# Patient Record
Sex: MALE | Race: WHITE | Employment: FULL TIME | URBAN - METROPOLITAN AREA
[De-identification: names, ages, dates, MRNs, and addresses within clinical notes are randomized per-mention and may not be internally consistent; named-entity substitution may affect disease eponyms.]

---

## 2017-01-05 ENCOUNTER — OFFICE VISIT (OUTPATIENT)
Dept: FAMILY MEDICINE | Facility: CLINIC | Age: 32
End: 2017-01-05
Payer: COMMERCIAL

## 2017-01-05 VITALS
DIASTOLIC BLOOD PRESSURE: 78 MMHG | WEIGHT: 204 LBS | SYSTOLIC BLOOD PRESSURE: 116 MMHG | BODY MASS INDEX: 29.27 KG/M2 | TEMPERATURE: 97.5 F | HEART RATE: 67 BPM | OXYGEN SATURATION: 96 %

## 2017-01-05 DIAGNOSIS — H65.01 RIGHT ACUTE SEROUS OTITIS MEDIA, RECURRENCE NOT SPECIFIED: Primary | ICD-10-CM

## 2017-01-05 PROCEDURE — 99213 OFFICE O/P EST LOW 20 MIN: CPT | Performed by: FAMILY MEDICINE

## 2017-01-05 RX ORDER — PREDNISONE 20 MG/1
60 TABLET ORAL DAILY
Qty: 15 TABLET | Refills: 0 | Status: SHIPPED | OUTPATIENT
Start: 2017-01-05 | End: 2017-08-21

## 2017-01-05 RX ORDER — DOXYCYCLINE 100 MG/1
CAPSULE ORAL
Refills: 0 | COMMUNITY
Start: 2017-01-03 | End: 2017-08-21

## 2017-01-05 RX ORDER — CIPROFLOXACIN 500 MG/1
500 TABLET, FILM COATED ORAL 2 TIMES DAILY
Qty: 20 TABLET | Refills: 0 | Status: SHIPPED | OUTPATIENT
Start: 2017-01-05 | End: 2017-08-21

## 2017-01-05 ASSESSMENT — PAIN SCALES - GENERAL: PAINLEVEL: NO PAIN (0)

## 2017-01-05 NOTE — MR AVS SNAPSHOT
After Visit Summary   1/5/2017    Abelino Gracia    MRN: [id number]           Patient Information     Date Of Birth          1985        Visit Information        Provider Department      1/5/2017 2:20 PM Abdias Garcia MD [facility]        Today's Diagnoses     Right acute serous otitis media, recurrence not specified    -  1        Follow-ups after your visit        Your next 10 appointments already scheduled     Feb 22, 2017  4:30 PM   LAB with CP LAB   [facility] ([facility])    [address] [facility] [address]   [phone]           Patient must bring picture ID.  Patient should be prepared to give a urine specimen  Please do not eat 10-12 hours before your appointment if you are coming in fasting for labs on lipids, cholesterol, or glucose (sugar).  Pregnant women should follow their Care Team instructions. Water with medications is okay. Do not drink coffee or other fluids.   If you have concerns about taking  your medications, please ask at office or if scheduling via [employer], send a message by clicking on Secure Messaging, Message Your Care Team.            Apr 13, 2017 10:40 AM   Return Visit with Anju Vizcarra MD   [facility] ([facility]    [id number] [place] [address] [facility] [address] [phone]            Aug 21, 2017  7:30 AM   LAB with LAB FIRST FLOOR [facility] ([facility])    [id number] [place] [id number] [phone]           Patient must bring picture ID.  Patient should be prepared to give a urine specimen  Please do not eat 10-12 hours before your appointment if you are coming in fasting for labs on lipids, cholesterol, or glucose (sugar).  Pregnant women should follow their Care Team instructions. Water with medications is okay. Do not drink coffee or other  fluids.   If you have concerns about taking  your medications, please ask at office or if scheduling via Preo, send a message by clicking on Secure Messaging, Message Your Care Team.            Aug 21, 2017  8:00 AM   [facility]    [address] [place] [address] [phone]              Who to contact     If you have questions or need follow up information about today's clinic visit or your schedule please contact [facility] directly at [phone].  Normal or non-critical lab and imaging results will be communicated to you by MyChart, letter or phone within 4 business days after the clinic has received the results. If you do not hear from us within 7 days, please contact the clinic through IsoPlexishart or phone. If you have a critical or abnormal lab result, we will notify you by phone as soon as possible.  Submit refill requests through Preo or call your pharmacy and they will forward the refill request to us. Please allow 3 business days for your refill to be completed.          Additional Information About Your Visit        IsoPlexisharThrill Information     Preo gives you secure access to your electronic health record. If you see a primary care provider, you can also send messages to your care team and make appointments. If you have questions, please call your primary care clinic.  If you do not have a primary care provider, please call [phone] and they will assist you.        Care EveryWhere ID     This is your Care EveryWhere ID. This could be used by other organizations to access your [place] medical records  [id number]        Your Vitals Were     Pulse Temperature Pulse Oximetry             67 97.5  F (36.4  C) (Oral) 96%          Blood Pressure from Last 3 Encounters:   01/05/17 116/78   11/14/16 122/78   10/13/16 121/70    Weight from Last 3 Encounters:   01/05/17 204  lb (92.534 kg)   10/13/16 207 lb 8 oz (94.121 kg)   10/12/16 207 lb 14.4 oz (94.303 kg)              Today, you had the following     No orders found for display         Today's Medication Changes          These changes are accurate as of: 1/5/17  2:55 PM.  If you have any questions, ask your nurse or doctor.               Start taking these medicines.        Dose/Directions    ciprofloxacin 500 MG tablet   Commonly known as:  CIPRO   Used for:  Right acute serous otitis media, recurrence not specified   Started by:  Abdias Garcia MD        Dose:  500 mg   Take 1 tablet (500 mg) by mouth 2 times daily   Quantity:  20 tablet   Refills:  0       predniSONE 20 MG tablet   Commonly known as:  DELTASONE   Used for:  Right acute serous otitis media, recurrence not specified   Started by:  Abdias Garcia MD        Dose:  60 mg   Take 3 tablets (60 mg) by mouth daily   Quantity:  15 tablet   Refills:  0            Where to get your medicines      These medications were sent to [facility] [place] [address]  [address] [facility] [address]     Phone:  [phone]    - ciprofloxacin 500 MG tablet  - predniSONE 20 MG tablet             Primary Care Provider Office Phone # Fax #    Abdias Garcia [phone]       [facility] [address] [facility] [address]        Thank you!     Thank you for choosing [facility]  for your care. Our goal is always to provide you with excellent care. Hearing back from our patients is one way we can continue to improve our services. Please take a few minutes to complete the written survey that you may receive in the mail after your visit with us. Thank you!             Your Updated Medication List - Protect others around you: Learn how to safely use, store and throw away your medicines at www.disposemymeds.org.          This list is accurate  as of: 1/5/17  2:55 PM.  Always use your most recent med list.                   Brand Name Dispense Instructions for use    * albuterol 108 (90 BASE) MCG/ACT Inhaler    albuterol    2 Inhaler    Inhale 2 puffs into the lungs every 4 hours as needed       * albuterol (2.5 MG/3ML) 0.083% neb solution     360 mL    Take 1 vial (2.5 mg) by nebulization every 4 hours as needed       ALLEGRA PO      Take 60 mg by mouth daily       cetirizine 10 MG tablet    zyrTEC     Take 10 mg by mouth       ciprofloxacin 500 MG tablet    CIPRO    20 tablet    Take 1 tablet (500 mg) by mouth 2 times daily       doxycycline Monohydrate 100 MG Caps      TK 1 C PO Q 12 H UTD       FLONASE 50 MCG/ACT spray   Generic drug:  fluticasone     1 Package    Spray 1-2 sprays into both nostrils daily       losartan 25 MG tablet    COZAAR    45 tablet    Take 0.5 tablets (12.5 mg) by mouth daily       predniSONE 20 MG tablet    DELTASONE    15 tablet    Take 3 tablets (60 mg) by mouth daily       propranolol 60 MG 24 hr capsule    INDERAL LA    90 capsule    Take 1 capsule (60 mg) by mouth daily       traZODone 50 MG tablet    DESYREL    270 tablet    Take 3 tablets (150 mg) by mouth nightly as needed for sleep       TYLENOL PO      Take by mouth as needed daily       * Notice:  This list has 2 medication(s) that are the same as other medications prescribed for you. Read the directions carefully, and ask your doctor or other care provider to review them with you.

## 2017-01-05 NOTE — PROGRESS NOTES
SUBJECTIVE:                                                    Abelino Gracia is a 31 year old male who presents to clinic today for the following health issues:    Bilat ears feel plugged  For the last several days.    Problem list and histories reviewed & adjusted, as indicated.    Patient has cold symptoms   He has no fever   No sore throat   No cough     He was on Virtual and was told he had a sinus infection   He was put on doxycycline     He is not better     O: /78 mmHg  Pulse 67  Temp(Src) 97.5  F (36.4  C) (Oral)  Wt 204 lb (92.534 kg)  SpO2 96%    Head: Normocephalic, atraumatic.  Eyes: Conjunctiva clear, non icteric. PERRLA.  Ears: External ears and TMs on the right have serous fluid ; left appears ok   Sinuses are non tender   Nose: Septum midline, nasal mucosa pink and moist. No discharge.  Mouth / Throat: Normal dentition.  No oral lesions. Pharynx non erythematous, tonsils without hypertrophy.  Neck: Supple, no enlarged LN, trachea midline.    Chest wall normal to inspection and palpation. Good excursion bilaterally. Lungs clear to auscultation. Good air movement bilaterally without rales, wheezes, or rhonchi.   Regular rate and  rhythm. S1 and S2 normal, no murmurs, clicks, gallops or rubs. No edema or JVD.      ICD-10-CM    1. Right acute serous otitis media, recurrence not specified H65.01 predniSONE (DELTASONE) 20 MG tablet     ciprofloxacin (CIPRO) 500 MG tablet     Stop the doxycycline   Warned about increased risk of tendon rupture   If not better in 4 weeks have him follow up with ENT

## 2017-01-05 NOTE — NURSING NOTE
"Chief Complaint   Patient presents with     Ear Problem     Bilat ears feel plugged for several days       Initial /78 mmHg  Pulse 67  Temp(Src) 97.5  F (36.4  C) (Oral)  Wt 204 lb (92.534 kg)  SpO2 96% Estimated body mass index is 29.27 kg/(m^2) as calculated from the following:    Height as of 10/13/16: 5' 10\" (1.778 m).    Weight as of this encounter: 204 lb (92.534 kg).  BP completed using cuff size: regular  Avani SINGH      "

## 2017-02-22 ENCOUNTER — DOCUMENTATION ONLY (OUTPATIENT)
Dept: LAB | Facility: CLINIC | Age: 32
End: 2017-02-22

## 2017-02-22 NOTE — PROGRESS NOTES
This patient has a lab appointment today at 1630 and the order is . Did you want lab work on this patient?? If so, please place lab orders ASAP. THank you, [place] lab.

## 2017-03-10 ENCOUNTER — OFFICE VISIT (OUTPATIENT)
Dept: FAMILY MEDICINE | Facility: CLINIC | Age: 32
End: 2017-03-10
Payer: COMMERCIAL

## 2017-03-10 VITALS
BODY MASS INDEX: 29.35 KG/M2 | HEART RATE: 102 BPM | WEIGHT: 205 LBS | HEIGHT: 70 IN | DIASTOLIC BLOOD PRESSURE: 80 MMHG | OXYGEN SATURATION: 100 % | SYSTOLIC BLOOD PRESSURE: 129 MMHG | TEMPERATURE: 97.3 F

## 2017-03-10 DIAGNOSIS — F51.01 PRIMARY INSOMNIA: Primary | ICD-10-CM

## 2017-03-10 DIAGNOSIS — F17.200 TOBACCO USE DISORDER: ICD-10-CM

## 2017-03-10 DIAGNOSIS — G43.109 MIGRAINE WITH AURA AND WITHOUT STATUS MIGRAINOSUS, NOT INTRACTABLE: ICD-10-CM

## 2017-03-10 DIAGNOSIS — N18.30 CKD (CHRONIC KIDNEY DISEASE) STAGE 3, GFR 30-59 ML/MIN (H): ICD-10-CM

## 2017-03-10 DIAGNOSIS — J45.20 MILD INTERMITTENT ASTHMA WITHOUT COMPLICATION: ICD-10-CM

## 2017-03-10 LAB
ANION GAP SERPL CALCULATED.3IONS-SCNC: 7 MMOL/L (ref 3–14)
BUN SERPL-MCNC: 31 MG/DL (ref 7–30)
CALCIUM SERPL-MCNC: 8.9 MG/DL (ref 8.5–10.1)
CHLORIDE SERPL-SCNC: 109 MMOL/L (ref 94–109)
CO2 SERPL-SCNC: 25 MMOL/L (ref 20–32)
CREAT SERPL-MCNC: 1.75 MG/DL (ref 0.66–1.25)
FEF 25/75: NORMAL
FEV-1: NORMAL
FEV1/FVC: NORMAL
FVC: NORMAL
GFR SERPL CREATININE-BSD FRML MDRD: 45 ML/MIN/1.7M2
GLUCOSE SERPL-MCNC: 116 MG/DL (ref 70–99)
POTASSIUM SERPL-SCNC: 4.3 MMOL/L (ref 3.4–5.3)
SODIUM SERPL-SCNC: 141 MMOL/L (ref 133–144)

## 2017-03-10 PROCEDURE — 99213 OFFICE O/P EST LOW 20 MIN: CPT | Mod: 25 | Performed by: FAMILY MEDICINE

## 2017-03-10 PROCEDURE — 80048 BASIC METABOLIC PNL TOTAL CA: CPT | Performed by: FAMILY MEDICINE

## 2017-03-10 PROCEDURE — 36415 COLL VENOUS BLD VENIPUNCTURE: CPT | Performed by: FAMILY MEDICINE

## 2017-03-10 PROCEDURE — 94010 BREATHING CAPACITY TEST: CPT | Performed by: FAMILY MEDICINE

## 2017-03-10 PROCEDURE — 99395 PREV VISIT EST AGE 18-39: CPT | Mod: 25 | Performed by: FAMILY MEDICINE

## 2017-03-10 RX ORDER — ALBUTEROL SULFATE 90 UG/1
2 AEROSOL, METERED RESPIRATORY (INHALATION) EVERY 4 HOURS PRN
Qty: 2 INHALER | Refills: 3 | Status: SHIPPED | OUTPATIENT
Start: 2017-03-10 | End: 2019-07-30

## 2017-03-10 RX ORDER — PROPRANOLOL HCL 60 MG
60 CAPSULE, EXTENDED RELEASE 24HR ORAL DAILY
Qty: 90 CAPSULE | Refills: 3 | Status: SHIPPED | OUTPATIENT
Start: 2017-03-10 | End: 2018-02-08

## 2017-03-10 NOTE — PROGRESS NOTES
SUBJECTIVE:     CC: Abelino Gracia is an 32 year old male who presents for preventative health visit.     Healthy Habits:    Do you get at least three servings of calcium containing foods daily (dairy, green leafy vegetables, etc.)? yes    Amount of exercise or daily activities, outside of work: 0 day(s) per week    Problems taking medications regularly No    Medication side effects: No    Have you had an eye exam in the past two years? yes    Do you see a dentist twice per year? yes    Do you have sleep apnea, excessive snoring or daytime drowsiness?yes        PROBLEMS TO ADD ON...  -------------------------------------  Had mold testing done in his home and it was discovered that he has over     THIS HAS been since January and his asthma got worse   He is having runny nose   He has allergies in the summer with pollen   He had testing done years agp         Today's PHQ-2 Score:   PHQ-2 ( 1999 [employer]) 3/7/2016 3/13/2015   Q1: Little interest or pleasure in doing things 0 0   Q2: Feeling down, depressed or hopeless 0 0   PHQ-2 Score 0 0       Abuse: Current or Past(Physical, Sexual or Emotional)- No  Do you feel safe in your environment - Yes    Social History   Substance Use Topics     Smoking status: Current Every Day Smoker     Packs/day: 1.00     Years: 12.00     Types: Cigarettes     Smokeless tobacco: Never Used      Comment: Have tried chantix     Alcohol use No      Comment: SOBER SINCE JANUARY 2009     The patient does not drink >3 drinks per day nor >7 drinks per week.    Last PSA: No results found for: PSA    Recent Labs   Lab Test  03/07/16   0810  03/13/15   0825   CHOL  162  128   HDL  35*  32*   LDL  103*  70   TRIG  122  131   CHOLHDLRATIO   --   4.0   NHDL  127   --        Reviewed orders with patient. Reviewed health maintenance and updated orders accordingly - Yes    Reviewed and updated as needed this visit by clinical staff         Reviewed and updated as needed this visit by  Provider        Past Medical History   Diagnosis Date     Allergies      cats,dust, pollens     Anxiety state, unspecified      Asthma, mild intermittent      Depression      Insomnia, unspecified      Leukocytosis 5/13/2015     Migraine headaches      Nondependent alcohol abuse      sober since Jan 2009     DESHAUN (obstructive sleep apnea)      not tolerating CPAP     Panic disorder without agoraphobia      Tobacco abuse       Past Surgical History   Procedure Laterality Date     Appendectomy  08/25/2004     Tonsillectomy  06/08/2006     C nonspecific procedure   Feb 2011      Laparoscopic cholecystectomy.     Cholecystectomy       Colonoscopy N/A 11/14/2016     Procedure: COLONOSCOPY;  Surgeon: Mauro Tan MD;  Location:  GI       ROS:  C: NEGATIVE for fever, chills, change in weight  I: NEGATIVE for worrisome rashes, moles or lesions  E: NEGATIVE for vision changes or irritation  ENT: NEGATIVE for ear, mouth and throat problems  R: NEGATIVE for significant cough or SOB  CV: NEGATIVE for chest pain, palpitations or peripheral edema  GI: NEGATIVE for nausea, abdominal pain, heartburn, or change in bowel habits   male: negative for dysuria, hematuria, decreased urinary stream, erectile dysfunction, urethral discharge  M: NEGATIVE for significant arthralgias or myalgia  N: NEGATIVE for weakness, dizziness or paresthesias  P: NEGATIVE for changes in mood or affect    Problem list, Medication list, Allergies, and Medical/Social/Surgical histories reviewed in [facility] and updated as appropriate.  Labs reviewed in EPIC  BP Readings from Last 3 Encounters:   03/10/17 129/80   01/05/17 116/78   11/14/16 122/78    Wt Readings from Last 3 Encounters:   03/10/17 205 lb (93 kg)   01/05/17 204 lb (92.5 kg)   10/13/16 207 lb 8 oz (94.1 kg)                  Patient Active Problem List   Diagnosis     Insomnia     Migraine headache     Tobacco abuse     Asthma, mild intermittent     DESHAUN (obstructive sleep apnea)      CARDIOVASCULAR SCREENING; LDL GOAL LESS THAN 160     CKD (chronic kidney disease) stage 3, GFR 30-59 ml/min     Leukocytosis     QURESHI (nonalcoholic steatohepatitis)     BMI 31.0-31.9,adult     Hypophosphatemia     Past Surgical History   Procedure Laterality Date     Appendectomy  2004     Tonsillectomy  2006     C nonspecific procedure   2011      Laparoscopic cholecystectomy.     Cholecystectomy       Colonoscopy N/A 2016     Procedure: COLONOSCOPY;  Surgeon: Mauro Tan MD;  Location:  GI       Social History   Substance Use Topics     Smoking status: Current Every Day Smoker     Packs/day: 1.00     Years: 12.00     Types: Cigarettes     Smokeless tobacco: Never Used      Comment: Have tried chantix     Alcohol use No      Comment: SOBER SINCE 2009     Family History   Problem Relation Age of Onset     Allergies Mother      Allergies Brother      HEART DISEASE Maternal Grandfather      Hypertension Maternal Grandfather      Asthma Other      Cancer - colorectal Maternal Grandmother 80     CANCER Maternal Grandmother 40     uterine cancer     KIDNEY DISEASE Maternal Grandmother       of kidney failure - ~ age 80     DIABETES Maternal Grandmother      Colon Cancer Maternal Grandmother      Other Cancer Maternal Grandmother          Current Outpatient Prescriptions   Medication Sig Dispense Refill     cetirizine (ZYRTEC) 10 MG tablet Take 10 mg by mouth       losartan (COZAAR) 25 MG tablet Take 0.5 tablets (12.5 mg) by mouth daily 45 tablet 3     traZODone (DESYREL) 50 MG tablet Take 3 tablets (150 mg) by mouth nightly as needed for sleep 270 tablet 11     propranolol (INDERAL LA) 60 MG capsule Take 1 capsule (60 mg) by mouth daily 90 capsule 3     fluticasone (FLONASE) 50 MCG/ACT nasal spray Spray 1-2 sprays into both nostrils daily 1 Package 11     Fexofenadine HCl (ALLEGRA PO) Take 60 mg by mouth daily        doxycycline Monohydrate 100 MG CAPS Reported on 3/10/2017  0      predniSONE (DELTASONE) 20 MG tablet Take 3 tablets (60 mg) by mouth daily (Patient not taking: Reported on 3/10/2017) 15 tablet 0     ciprofloxacin (CIPRO) 500 MG tablet Take 1 tablet (500 mg) by mouth 2 times daily (Patient not taking: Reported on 3/10/2017) 20 tablet 0     albuterol (ALBUTEROL) 108 (90 BASE) MCG/ACT inhaler Inhale 2 puffs into the lungs every 4 hours as needed (Patient not taking: Reported on 3/10/2017) 2 Inhaler 3     albuterol (2.5 MG/3ML) 0.083% nebulizer solution Take 1 vial (2.5 mg) by nebulization every 4 hours as needed (Patient not taking: Reported on 3/10/2017) 360 mL 3     Acetaminophen (TYLENOL PO) Take by mouth as needed daily       Allergies   Allergen Reactions     Clarithromycin Hives     Penicillins      Sulfa Drugs      Adhesive Tape Rash     OBJECTIVE:     There were no vitals taken for this visit.  EXAM:  GENERAL: healthy, alert and no distress  EYES: Eyes grossly normal to inspection, PERRL and conjunctivae and sclerae normal  HENT: ear canals and TM's normal, nose and mouth without ulcers or lesions  NECK: no adenopathy, no asymmetry, masses, or scars and thyroid normal to palpation  RESP: lungs clear to auscultation - no rales, rhonchi or wheezes  CV: regular rate and rhythm, normal S1 S2, no S3 or S4, no murmur, click or rub, no peripheral edema and peripheral pulses strong  ABDOMEN: soft, nontender, no hepatosplenomegaly, no masses and bowel sounds normal  MS: no gross musculoskeletal defects noted, no edema  SKIN: no suspicious lesions or rashes  NEURO: Normal strength and tone, mentation intact and speech normal  PSYCH: mentation appears normal, affect normal/bright    ASSESSMENT/PLAN:         ICD-10-CM    1. Primary insomnia F51.01 fluticasone-salmeterol (ADVAIR) 250-50 MCG/DOSE diskus inhaler     SLEEP EVALUATION & MANAGEMENT REFERRAL - ADULT   2. Mild intermittent asthma without complication J45.20 Asthma Action Plan (AAP)     fluticasone-salmeterol (ADVAIR) 250-50  "MCG/DOSE diskus inhaler     albuterol (ALBUTEROL) 108 (90 BASE) MCG/ACT Inhaler     Spirometry, Breathing Capacity   3. Migraine with aura and without status migrainosus, not intractable G43.109 MIGRAINE ACTION PLAN     propranolol (INDERAL LA) 60 MG 24 hr capsule   4. CKD (chronic kidney disease) stage 3, GFR 30-59 ml/min N18.3 BASIC METABOLIC PANEL       COUNSELING:  Reviewed preventive health counseling, as reflected in patient instructions       Regular exercise       Healthy diet/nutrition         reports that he has been smoking Cigarettes.  He has a 12.00 pack-year smoking history. He has never used smokeless tobacco.  Tobacco Cessation Action Plan: Pharmacotherapies : Chantix  Estimated body mass index is 29.27 kg/(m^2) as calculated from the following:    Height as of 10/13/16: 5' 10\" (1.778 m).    Weight as of 1/5/17: 204 lb (92.5 kg).   Weight management plan: Discussed healthy diet and exercise guidelines and patient will follow up in 12 months in clinic to re-evaluate.    Counseling Resources:  ATP IV Guidelines  Pooled Cohorts Equation Calculator  FRAX Risk Assessment  ICSI Preventive Guidelines  Dietary Guidelines for Americans, 2010  USDA's MyPlate  ASA Prophylaxis  Lung CA Screening    Abdias Garcia MD  [facility]  "

## 2017-03-10 NOTE — NURSING NOTE
"Chief Complaint   Patient presents with     Physical       Initial /80 (BP Location: Left arm, Patient Position: Chair, Cuff Size: Adult Large)  Pulse 102  Temp 97.3  F (36.3  C) (Oral)  Ht 5' 9.88\" (1.775 m)  Wt 205 lb (93 kg)  SpO2 100%  BMI 29.51 kg/m2 Estimated body mass index is 29.51 kg/(m^2) as calculated from the following:    Height as of this encounter: 5' 9.88\" (1.775 m).    Weight as of this encounter: 205 lb (93 kg).  Medication Reconciliation: complete   Brittany Judge CMA       "

## 2017-03-10 NOTE — MR AVS SNAPSHOT
After Visit Summary   3/10/2017    Abelino Gracia    MRN: [id number]           Patient Information     Date Of Birth          1985        Visit Information        Provider Department      3/10/2017 7:40 AM Abdias Garcia MD [facility]        Today's Diagnoses     Primary insomnia    -  1    Mild intermittent asthma without complication        Migraine with aura and without status migrainosus, not intractable        CKD (chronic kidney disease) stage 3, GFR 30-59 ml/min        Tobacco use disorder          Care Instructions      Preventive Health Recommendations  Male Ages 26 - 39    Yearly exam:             See your health care provider every year in order to  o   Review health changes.   o   Discuss preventive care.    o   Review your medicines if your doctor has prescribed any.    You should be tested each year for STDs (sexually transmitted diseases), if you re at risk.     After age 35, talk to your provider about cholesterol testing. If you are at risk for heart disease, have your cholesterol tested at least every 5 years.     If you are at risk for diabetes, you should have a diabetes test (fasting glucose).  Shots: Get a flu shot each year. Get a tetanus shot every 10 years.     Nutrition:    Eat at least 5 servings of fruits and vegetables daily.     Eat whole-grain bread, whole-wheat pasta and brown rice instead of white grains and rice.     Talk to your provider about Calcium and Vitamin D.     Lifestyle    Exercise for at least 150 minutes a week (30 minutes a day, 5 days a week). This will help you control your weight and prevent disease.     Limit alcohol to one drink per day.     No smoking.     Wear sunscreen to prevent skin cancer.     See your dentist every six months for an exam and cleaning.           Follow-ups after your visit        Additional Services     SLEEP EVALUATION & MANAGEMENT REFERRAL - ADULT       Please be aware that coverage  of these services is subject to the terms and limitations of your health insurance plan.  Call member services at your health plan with any benefit or coverage questions.      Please bring the following to your appointment:    >>   List of current medications   >>   This referral request   >>   Any documents/labs given to you for this referral    [facility] [phone] (Age 15 and up)                  Your next 10 appointments already scheduled     Apr 13, 2017 10:40 AM CDT   Return Visit with Anju Vizcarra MD   [facility])    [address] [place] [address]   [phone]            Aug 21, 2017  7:30 AM CDT   LAB with LAB FIRST FLOOR [facility])    [address] [place] [address]   [phone]           Patient must bring picture ID.  Patient should be prepared to give a urine specimen  Please do not eat 10-12 hours before your appointment if you are coming in fasting for labs on lipids, cholesterol, or glucose (sugar).  Pregnant women should follow their Care Team instructions. Water with medications is okay. Do not drink coffee or other fluids.   If you have concerns about taking  your medications, please ask at office or if scheduling via [employer], send a message by clicking on Secure Messaging, Message Your Care Team.            Aug 21, 2017  8:00 AM CDT   Return Visit with Amina Mena MD   [facility])    [address] [place] [address]   [phone]              Future tests that were ordered for you today     Open Future Orders        Priority Expected Expires Ordered    SLEEP EVALUATION & MANAGEMENT REFERRAL - ADULT Routine  3/10/2018 3/10/2017            Who to contact     If you have questions or need follow up information about today's clinic visit or your schedule  "please contact [facility] directly at [phone].  Normal or non-critical lab and imaging results will be communicated to you by MyChart, letter or phone within 4 business days after the clinic has received the results. If you do not hear from us within 7 days, please contact the clinic through [employer] or phone. If you have a critical or abnormal lab result, we will notify you by phone as soon as possible.  Submit refill requests through [employer] or call your pharmacy and they will forward the refill request to us. Please allow 3 business days for your refill to be completed.          Additional Information About Your Visit        [employer] Information     [employer] gives you secure access to your electronic health record. If you see a primary care provider, you can also send messages to your care team and make appointments. If you have questions, please call your primary care clinic.  If you do not have a primary care provider, please call [phone] and they will assist you.        Care EveryWhere ID     This is your Care EveryWhere ID. This could be used by other organizations to access your [place] medical records  [id number]        Your Vitals Were     Pulse Temperature Height Pulse Oximetry BMI (Body Mass Index)       102 97.3  F (36.3  C) (Oral) 5' 9.88\" (1.775 m) 100% 29.51 kg/m2        Blood Pressure from Last 3 Encounters:   03/10/17 129/80   01/05/17 116/78   11/14/16 122/78    Weight from Last 3 Encounters:   03/10/17 205 lb (93 kg)   01/05/17 204 lb (92.5 kg)   10/13/16 207 lb 8 oz (94.1 kg)              We Performed the Following     Asthma Action Plan (AAP)     BASIC METABOLIC PANEL     MIGRAINE ACTION PLAN     Spirometry, Breathing Capacity          Today's Medication Changes          These changes are accurate as of: 3/10/17  8:42 AM.  If you have any questions, ask your nurse or doctor.               Start taking these medicines.        Dose/Directions    " fluticasone-salmeterol 250-50 MCG/DOSE diskus inhaler   Commonly known as:  ADVAIR   Used for:  Mild intermittent asthma without complication, Primary insomnia   Started by:  Abdias aGrcia MD        Dose:  1 puff   Inhale 1 puff into the lungs 2 times daily   Quantity:  1 Inhaler   Refills:  1       varenicline 0.5 MG X 11 & 1 MG X 42 tablet   Commonly known as:  CHANTIX STARTING MONTH PAK   Used for:  Tobacco use disorder   Started by:  Abdias Garcia MD        Take 0.5 mg tab daily for 3 days, then 0.5 mg tab twice daily for 4 days, then 1 mg twice daily.   Quantity:  53 tablet   Refills:  0            Where to get your medicines      These medications were sent to [id number] - [place] [id number] AT [facility] [address], [facility] [address]     Phone:  [phone]     albuterol 108 (90 BASE) MCG/ACT Inhaler    fluticasone-salmeterol 250-50 MCG/DOSE diskus inhaler    propranolol 60 MG 24 hr capsule    varenicline 0.5 MG X 11 & 1 MG X 42 tablet                Primary Care Provider Office Phone # Fax #    Abdias Garcia [phone]       [facility] [address]  [facility] [address]        Thank you!     Thank you for choosing [facility]  for your care. Our goal is always to provide you with excellent care. Hearing back from our patients is one way we can continue to improve our services. Please take a few minutes to complete the written survey that you may receive in the mail after your visit with us. Thank you!             Your Updated Medication List - Protect others around you: Learn how to safely use, store and throw away your medicines at www.disposemymeds.org.          This list is accurate as of: 3/10/17  8:42 AM.  Always use your most recent med list.                   Brand Name Dispense Instructions for use    * albuterol (2.5 MG/3ML) 0.083% neb solution     360 mL     Take 1 vial (2.5 mg) by nebulization every 4 hours as needed       * albuterol 108 (90 BASE) MCG/ACT Inhaler    albuterol    2 Inhaler    Inhale 2 puffs into the lungs every 4 hours as needed       ALLEGRA PO      Take 60 mg by mouth daily       cetirizine 10 MG tablet    zyrTEC     Take 10 mg by mouth       ciprofloxacin 500 MG tablet    CIPRO    20 tablet    Take 1 tablet (500 mg) by mouth 2 times daily       doxycycline Monohydrate 100 MG Caps      Reported on 3/10/2017       FLONASE 50 MCG/ACT spray   Generic drug:  fluticasone     1 Package    Spray 1-2 sprays into both nostrils daily       fluticasone-salmeterol 250-50 MCG/DOSE diskus inhaler    ADVAIR    1 Inhaler    Inhale 1 puff into the lungs 2 times daily       losartan 25 MG tablet    COZAAR    45 tablet    Take 0.5 tablets (12.5 mg) by mouth daily       predniSONE 20 MG tablet    DELTASONE    15 tablet    Take 3 tablets (60 mg) by mouth daily       propranolol 60 MG 24 hr capsule    INDERAL LA    90 capsule    Take 1 capsule (60 mg) by mouth daily       traZODone 50 MG tablet    DESYREL    270 tablet    Take 3 tablets (150 mg) by mouth nightly as needed for sleep       TYLENOL PO      Take by mouth as needed daily       varenicline 0.5 MG X 11 & 1 MG X 42 tablet    CHANTIX STARTING MONTH EL    53 tablet    Take 0.5 mg tab daily for 3 days, then 0.5 mg tab twice daily for 4 days, then 1 mg twice daily.       * Notice:  This list has 2 medication(s) that are the same as other medications prescribed for you. Read the directions carefully, and ask your doctor or other care provider to review them with you.

## 2017-03-11 ASSESSMENT — ASTHMA QUESTIONNAIRES: ACT_TOTALSCORE: 13

## 2017-08-09 DIAGNOSIS — N18.30 CKD (CHRONIC KIDNEY DISEASE) STAGE 3, GFR 30-59 ML/MIN (H): Primary | Chronic | ICD-10-CM

## 2017-08-11 DIAGNOSIS — N18.30 CKD (CHRONIC KIDNEY DISEASE) STAGE 3, GFR 30-59 ML/MIN (H): Primary | ICD-10-CM

## 2017-08-11 RX ORDER — LOSARTAN POTASSIUM 25 MG/1
12.5 TABLET ORAL DAILY
Qty: 45 TABLET | Refills: 3 | Status: SHIPPED | OUTPATIENT
Start: 2017-08-11 | End: 2018-02-14

## 2017-08-11 NOTE — TELEPHONE ENCOUNTER
Writer received a refill request from: [facility]  in [place].     Medication: losartan (COZAAR) 25 MG tablet  Sig: Take 0.5 tablets (12.5 mg) by mouth daily  Date last written: 8/22/16  Dispensed amount: 45  Refills: 3  Date last dispensed: 5/14/17      Pt's last office visit: 8/22/16  Next scheduled office visit: 8/21/17

## 2017-08-11 NOTE — TELEPHONE ENCOUNTER
Refilled medication.    Ruthie Acevedo, RN, BSN  Nephrology Care Coordinator  [facility]

## 2017-08-21 ENCOUNTER — OFFICE VISIT (OUTPATIENT)
Dept: NEPHROLOGY | Facility: CLINIC | Age: 32
End: 2017-08-21
Payer: COMMERCIAL

## 2017-08-21 VITALS
DIASTOLIC BLOOD PRESSURE: 86 MMHG | HEART RATE: 74 BPM | WEIGHT: 240.2 LBS | TEMPERATURE: 98 F | BODY MASS INDEX: 34.58 KG/M2 | SYSTOLIC BLOOD PRESSURE: 119 MMHG | OXYGEN SATURATION: 96 %

## 2017-08-21 DIAGNOSIS — N18.30 CKD (CHRONIC KIDNEY DISEASE) STAGE 3, GFR 30-59 ML/MIN (H): Primary | Chronic | ICD-10-CM

## 2017-08-21 DIAGNOSIS — N18.30 CKD (CHRONIC KIDNEY DISEASE) STAGE 3, GFR 30-59 ML/MIN (H): Chronic | ICD-10-CM

## 2017-08-21 LAB
ALBUMIN SERPL-MCNC: 3.6 G/DL (ref 3.4–5)
ANION GAP SERPL CALCULATED.3IONS-SCNC: 8 MMOL/L (ref 3–14)
BUN SERPL-MCNC: 32 MG/DL (ref 7–30)
CALCIUM SERPL-MCNC: 9 MG/DL (ref 8.5–10.1)
CHLORIDE SERPL-SCNC: 110 MMOL/L (ref 94–109)
CO2 SERPL-SCNC: 22 MMOL/L (ref 20–32)
CREAT SERPL-MCNC: 1.59 MG/DL (ref 0.66–1.25)
CREAT UR-MCNC: 52 MG/DL
CREAT UR-MCNC: 56 MG/DL
GFR SERPL CREATININE-BSD FRML MDRD: 50 ML/MIN/1.7M2
GLUCOSE SERPL-MCNC: 111 MG/DL (ref 70–99)
HGB BLD-MCNC: 15.5 G/DL (ref 13.3–17.7)
MICROALBUMIN UR-MCNC: 41 MG/L
MICROALBUMIN/CREAT UR: 77.52 MG/G CR (ref 0–17)
PHOSPHATE SERPL-MCNC: 2.4 MG/DL (ref 2.5–4.5)
POTASSIUM SERPL-SCNC: 4.3 MMOL/L (ref 3.4–5.3)
PROT UR-MCNC: 0.2 G/L
PROT/CREAT 24H UR: 0.37 G/G CR (ref 0–0.2)
PTH-INTACT SERPL-MCNC: 60 PG/ML (ref 12–72)
SODIUM SERPL-SCNC: 140 MMOL/L (ref 133–144)

## 2017-08-21 PROCEDURE — 83970 ASSAY OF PARATHORMONE: CPT | Performed by: INTERNAL MEDICINE

## 2017-08-21 PROCEDURE — 99214 OFFICE O/P EST MOD 30 MIN: CPT | Mod: GC | Performed by: INTERNAL MEDICINE

## 2017-08-21 PROCEDURE — 85018 HEMOGLOBIN: CPT | Performed by: INTERNAL MEDICINE

## 2017-08-21 PROCEDURE — 82306 VITAMIN D 25 HYDROXY: CPT | Performed by: INTERNAL MEDICINE

## 2017-08-21 PROCEDURE — 84156 ASSAY OF PROTEIN URINE: CPT | Performed by: INTERNAL MEDICINE

## 2017-08-21 PROCEDURE — 80069 RENAL FUNCTION PANEL: CPT | Performed by: INTERNAL MEDICINE

## 2017-08-21 PROCEDURE — 82043 UR ALBUMIN QUANTITATIVE: CPT | Performed by: INTERNAL MEDICINE

## 2017-08-21 PROCEDURE — 36415 COLL VENOUS BLD VENIPUNCTURE: CPT | Performed by: INTERNAL MEDICINE

## 2017-08-21 NOTE — MR AVS SNAPSHOT
After Visit Summary   8/21/2017    Abelino Gracia    MRN: [id number]           Patient Information     Date Of Birth          1985        Visit Information        Provider Department      8/21/2017 8:00 AM Amina Mena MD [facility]        Today's Diagnoses     CKD (chronic kidney disease) stage 3, GFR 30-59 ml/min    -  1      Care Instructions    Follow-up in one year          Follow-ups after your visit        Your next 10 appointments already scheduled     Feb 21, 2018  7:00 AM CST   LAB with CP LAB   [facility]    [address] [facility] [id number] [phone]           Patient must bring picture ID. Patient should be prepared to give a urine specimen  Please do not eat 10-12 hours before your appointment if you are coming in fasting for labs on lipids, cholesterol, or glucose (sugar). Pregnant women should follow their Care Team instructions. Water with medications is okay. Do not drink coffee or other fluids. If you have concerns about taking  your medications, please ask at office or if scheduling via [employer], send a message by clicking on Secure Messaging, Message Your Care Team.            Aug 21, 2018  7:30 AM CDT   LAB with LAB FIRST FLOOR [facility] ([facility])    [address] [place] [address] [phone]           Patient must bring picture ID. Patient should be prepared to give a urine specimen  Please do not eat 10-12 hours before your appointment if you are coming in fasting for labs on lipids, cholesterol, or glucose (sugar). Pregnant women should follow their Care Team instructions. Water with medications is okay. Do not drink coffee or other fluids. If you have concerns about taking  your medications, please ask at office or if scheduling via [employer], send a message by clicking on Secure  Messaging, Message Your Care Team.            Aug 21, 2018  8:00 AM CDT   [facility]    [address] [address] [place] [address] [phone]              Who to contact     If you have questions or need follow up information about today's clinic visit or your schedule please contact [facility] directly at [phone].  Normal or non-critical lab and imaging results will be communicated to you by [employer], letter or phone within 4 business days after the clinic has received the results. If you do not hear from us within 7 days, please contact the clinic through [employer] or phone. If you have a critical or abnormal lab result, we will notify you by phone as soon as possible.  Submit refill requests through [employer] or call your pharmacy and they will forward the refill request to us. Please allow 3 business days for your refill to be completed.          Additional Information About Your Visit        [employer] Information     [employer] gives you secure access to your electronic health record. If you see a primary care provider, you can also send messages to your care team and make appointments. If you have questions, please call your primary care clinic.  If you do not have a primary care provider, please call [phone] and they will assist you.      [employer] is an electronic gateway that provides easy, online access to your medical records. With [employer], you can request a clinic appointment, read your test results, renew a prescription or communicate with your care team.     To access your existing account, please contact your [facility] or call [phone] for assistance.        Care EveryWhere ID     This is your Care EveryWhere ID. This could be used by other organizations to access your [place] medical records  [id number]        Your Vitals Were     Pulse  Temperature Pulse Oximetry BMI (Body Mass Index)          74 98  F (36.7  C) (Oral) 96% 34.58 kg/m2         Blood Pressure from Last 3 Encounters:   08/21/17 119/86   03/10/17 129/80   01/05/17 116/78    Weight from Last 3 Encounters:   08/21/17 109 kg (240 lb 3.2 oz)   03/10/17 93 kg (205 lb)   01/05/17 92.5 kg (204 lb)              We Performed the Following     Albumin Random Urine Quantitative          Today's Medication Changes          These changes are accurate as of: 8/21/17  8:24 AM.  If you have any questions, ask your nurse or doctor.               Stop taking these medicines if you haven't already. Please contact your care team if you have questions.     ciprofloxacin 500 MG tablet   Commonly known as:  CIPRO   Stopped by:  Amina Mena MD           doxycycline Monohydrate 100 MG Caps   Stopped by:  Amina Mena MD           fluticasone-salmeterol 250-50 MCG/DOSE diskus inhaler   Commonly known as:  ADVAIR   Stopped by:  Amina Mena MD           predniSONE 20 MG tablet   Commonly known as:  DELTASONE   Stopped by:  Amina Mena MD           traZODone 50 MG tablet   Commonly known as:  DESYREL   Stopped by:  Amina Mena MD           varenicline 0.5 MG X 11 & 1 MG X 42 tablet   Commonly known as:  CHANTIX STARTING MONTH EL   Stopped by:  Amina Mena MD                    Primary Care Provider Office Phone # Fax #    Asnjw L MD Jose [phone]       [address] [place] [address]        Equal Access to Services     [facility]: Hadii beny Sahffer, waaxda luqmarty, qaybta chanelalflip ayoub. So [facility] [phone].    ATENCIÓN: Si habla español, tiene a soto disposición servicios gratuitos de asistencia lingüística. Sabiha al [phone].    We comply with applicable federal civil rights laws and [place] laws. We do not discriminate on the basis of race,  color, national origin, age, disability sex, sexual orientation or gender identity.            Thank you!     Thank you for choosing [facility]  for your care. Our goal is always to provide you with excellent care. Hearing back from our patients is one way we can continue to improve our services. Please take a few minutes to complete the written survey that you may receive in the mail after your visit with us. Thank you!             Your Updated Medication List - Protect others around you: Learn how to safely use, store and throw away your medicines at www.disposemymeds.org.          This list is accurate as of: 8/21/17  8:24 AM.  Always use your most recent med list.                   Brand Name Dispense Instructions for use Diagnosis    * albuterol (2.5 MG/3ML) 0.083% neb solution     360 mL    Take 1 vial (2.5 mg) by nebulization every 4 hours as needed    Mild intermittent asthma without complication       * albuterol 108 (90 BASE) MCG/ACT Inhaler    albuterol    2 Inhaler    Inhale 2 puffs into the lungs every 4 hours as needed    Mild intermittent asthma without complication       ALLEGRA PO      Take 60 mg by mouth daily        cetirizine 10 MG tablet    zyrTEC     Take 10 mg by mouth        FLONASE 50 MCG/ACT spray   Generic drug:  fluticasone     1 Package    Spray 1-2 sprays into both nostrils daily        losartan 25 MG tablet    COZAAR    45 tablet    Take 0.5 tablets (12.5 mg) by mouth daily    CKD (chronic kidney disease) stage 3, GFR 30-59 ml/min       propranolol 60 MG 24 hr capsule    INDERAL LA    90 capsule    Take 1 capsule (60 mg) by mouth daily    Migraine with aura and without status migrainosus, not intractable       TYLENOL PO      Take by mouth as needed daily        * Notice:  This list has 2 medication(s) that are the same as other medications prescribed for you. Read the directions carefully, and ask your doctor or other care provider to review them with you.

## 2017-08-21 NOTE — NURSING NOTE
"Abelino Gracia's goals for this visit include: CC  He requests these members of his care team be copied on today's visit information: No    PCP: Abdias Garcia    Referring Provider:  No referring provider defined for this encounter.    Chief Complaint   Patient presents with     RECHECK       Initial /86 (BP Location: Left arm, Patient Position: Chair, Cuff Size: Adult Large)  Pulse 74  Temp 98  F (36.7  C) (Oral)  Wt 109 kg (240 lb 3.2 oz)  SpO2 96%  BMI 34.58 kg/m2 Estimated body mass index is 34.58 kg/(m^2) as calculated from the following:    Height as of 3/10/17: 1.775 m (5' 9.88\").    Weight as of this encounter: 109 kg (240 lb 3.2 oz).  Medication Reconciliation: complete  "

## 2017-08-21 NOTE — PROGRESS NOTES
08/21/2017   CHIEF COMPLAINT: CKD     HISTORY OF PRESENT ILLNESS:  Abelino Gracia is a 31-year-old male who has had elevated creatinine levels dating back to 2006 according to [facility] records.  His creatinine has ranged anywhere from 1.3-1.7 in his [facility] records.  He was seen by nephrology on one occasion while inpatient at [facility] in 2009 and his kidney injury at that time was felt to be related to long-standing heavy use of NSAIDs.  To briefly summarize risk factors for CKD: heavy NSAID use in the past, previous etoh abuse (sober since 09). Addt hx includes DESHAUN. His initial workup showed abnormal creatinine level, however, no hematuria. He had a very small amount of protein present in the urine which was no albumin. His serologic workup including C3, C4, ANCA, Anti-GBM, and immunofixations were normal. He is felt to have QURESHI as the cause of his liver abnormalities.    Since his last visit he quit smoking in March 2017, but because of that he gain almost 40 Lb since last year. He is complaining of on and off diarrhea but no dehydration, no fever or chills, no urinary issue , no hematuria, no nausea or vomiting. BP stable, no other complaint.        Past Medical History:   Diagnosis Date     Allergies     cats,dust, pollens     Panic disorder without agoraphobia       Past Surgical History:   Procedure Laterality Date     APPENDECTOMY  08/25/2004     C NONSPECIFIC PROCEDURE   Feb 2011     Laparoscopic cholecystectomy.     CHOLECYSTECTOMY       COLONOSCOPY N/A 11/14/2016    Procedure: COLONOSCOPY;  Surgeon: Mauro Tan MD;  Location:  GI     TONSILLECTOMY  06/08/2006          SOCIAL HISTORY:  He started smoking between ages [age]. Quit smoking March/2017. Previous heavy alcohol abuse for which he went through treatment, he has been sober since 2009.  No drug use.  He currently works for a health insurance company.  He does not have children.      REVIEW OF SYSTEMS: 4 point ROS obtained, all pertinent  positives and negatives in the HPI otherwise all other systems negative.      PHYSICAL EXAMINATION:   Blood pressure 119/86, pulse 74, temperature 98  F (36.7  C), temperature source Oral, weight 109 kg (240 lb 3.2 oz), SpO2 96 %.   GENERAL:  The patient is alert and oriented x3, in no acute distress, appears comfortable, pleasant to speak clearly.   HEART:  Regular rate and rhythm, no murmurs, rubs or gallops.   LUNGS:  Clear to auscultation and no rhonchi, or rales.   EXTREMITIES:  There is no lower extremity pitting edema.   SKIN:  There are no rashes appreciated.   PSYCHIATRIC:  Mood and affect are within normal limits.     Orders Only on 08/21/2017   Component Date Value Ref Range Status     Hemoglobin 08/21/2017 15.5  13.3 - 17.7 g/dL Final     Orders Only on 08/21/2017   Component Date Value Ref Range Status     Hemoglobin 08/21/2017 15.5  13.3 - 17.7 g/dL Final       ASSESSMENT AND PLAN:   1.  CKD Stage 3:  His biggest risk factor for kidney disease is related to long-standing NSAID use.  He has had a very low level of proteinuria without albuminuria - suggests potentially a tubular injury. NSAIDs would fit with this finding. He is now away from NSAIDs and creatinine has been relatively stable. Could not tolerate lisinopril for protein lowering effect because of slight cough -started on losartan and is tolerating. Will continue the same.      2. Hypertension: blood pressure acceptable    3. Intermittent diarrhea: encouraged follow-up with study given he feels sxs began around that time.     4. Obesity: encouraged weight loss given risk of obesity related glomerulopathy.     Patient instructions provided today:  Follow-up in one year.      Patient seen and discussed  with Dr. Mena.  Eladio Elise  Nephrology Fellow  Pager: [phone]     Attending Note: I have seen and examined this patient and the above note reflects my historical findings, exam findings, and assessment and plan.   Patient  Instructions   Follow-up in one year     Amina Mena DO

## 2017-08-24 DIAGNOSIS — N18.30 CKD (CHRONIC KIDNEY DISEASE) STAGE 3, GFR 30-59 ML/MIN (H): Primary | Chronic | ICD-10-CM

## 2017-08-24 LAB — DEPRECATED CALCIDIOL+CALCIFEROL SERPL-MC: 23 UG/L (ref 20–75)

## 2018-02-08 ENCOUNTER — OFFICE VISIT (OUTPATIENT)
Dept: FAMILY MEDICINE | Facility: CLINIC | Age: 33
End: 2018-02-08
Payer: COMMERCIAL

## 2018-02-08 VITALS
DIASTOLIC BLOOD PRESSURE: 81 MMHG | WEIGHT: 253 LBS | BODY MASS INDEX: 36.22 KG/M2 | HEIGHT: 70 IN | SYSTOLIC BLOOD PRESSURE: 126 MMHG | HEART RATE: 80 BPM | OXYGEN SATURATION: 97 % | TEMPERATURE: 96.9 F

## 2018-02-08 DIAGNOSIS — N18.30 CKD (CHRONIC KIDNEY DISEASE) STAGE 3, GFR 30-59 ML/MIN (H): Chronic | ICD-10-CM

## 2018-02-08 DIAGNOSIS — Z13.6 CARDIOVASCULAR SCREENING; LDL GOAL LESS THAN 100: ICD-10-CM

## 2018-02-08 DIAGNOSIS — G43.109 MIGRAINE WITH AURA AND WITHOUT STATUS MIGRAINOSUS, NOT INTRACTABLE: ICD-10-CM

## 2018-02-08 DIAGNOSIS — K75.81 NASH (NONALCOHOLIC STEATOHEPATITIS): Primary | Chronic | ICD-10-CM

## 2018-02-08 DIAGNOSIS — E55.9 VITAMIN D DEFICIENCY: ICD-10-CM

## 2018-02-08 LAB
ALBUMIN SERPL-MCNC: 3.9 G/DL (ref 3.4–5)
ALP SERPL-CCNC: 147 U/L (ref 40–150)
ALT SERPL W P-5'-P-CCNC: 107 U/L (ref 0–70)
ANION GAP SERPL CALCULATED.3IONS-SCNC: 11 MMOL/L (ref 3–14)
AST SERPL W P-5'-P-CCNC: 45 U/L (ref 0–45)
BASOPHILS # BLD AUTO: 0.1 10E9/L (ref 0–0.2)
BASOPHILS NFR BLD AUTO: 0.9 %
BILIRUB DIRECT SERPL-MCNC: 0.1 MG/DL (ref 0–0.2)
BILIRUB SERPL-MCNC: 0.7 MG/DL (ref 0.2–1.3)
BUN SERPL-MCNC: 22 MG/DL (ref 7–30)
CALCIUM SERPL-MCNC: 9.1 MG/DL (ref 8.5–10.1)
CHLORIDE SERPL-SCNC: 108 MMOL/L (ref 94–109)
CHOLEST SERPL-MCNC: 159 MG/DL
CO2 SERPL-SCNC: 20 MMOL/L (ref 20–32)
CREAT SERPL-MCNC: 1.92 MG/DL (ref 0.66–1.25)
CREAT UR-MCNC: 95 MG/DL
CREAT UR-MCNC: 95 MG/DL
DIFFERENTIAL METHOD BLD: ABNORMAL
EOSINOPHIL # BLD AUTO: 0.9 10E9/L (ref 0–0.7)
EOSINOPHIL NFR BLD AUTO: 9.8 %
ERYTHROCYTE [DISTWIDTH] IN BLOOD BY AUTOMATED COUNT: 12.8 % (ref 10–15)
GFR SERPL CREATININE-BSD FRML MDRD: 40 ML/MIN/1.7M2
GLUCOSE SERPL-MCNC: 113 MG/DL (ref 70–99)
HCT VFR BLD AUTO: 48.1 % (ref 40–53)
HDLC SERPL-MCNC: 40 MG/DL
HGB BLD-MCNC: 16.4 G/DL (ref 13.3–17.7)
LDLC SERPL CALC-MCNC: 91 MG/DL
LYMPHOCYTES # BLD AUTO: 1.9 10E9/L (ref 0.8–5.3)
LYMPHOCYTES NFR BLD AUTO: 20.4 %
MCH RBC QN AUTO: 30.5 PG (ref 26.5–33)
MCHC RBC AUTO-ENTMCNC: 34.1 G/DL (ref 31.5–36.5)
MCV RBC AUTO: 89 FL (ref 78–100)
MICROALBUMIN UR-MCNC: 57 MG/L
MICROALBUMIN/CREAT UR: 59.89 MG/G CR (ref 0–17)
MONOCYTES # BLD AUTO: 0.7 10E9/L (ref 0–1.3)
MONOCYTES NFR BLD AUTO: 7.8 %
NEUTROPHILS # BLD AUTO: 5.6 10E9/L (ref 1.6–8.3)
NEUTROPHILS NFR BLD AUTO: 61.1 %
NONHDLC SERPL-MCNC: 119 MG/DL
PHOSPHATE SERPL-MCNC: 2.3 MG/DL (ref 2.5–4.5)
PLATELET # BLD AUTO: 308 10E9/L (ref 150–450)
POTASSIUM SERPL-SCNC: 4.3 MMOL/L (ref 3.4–5.3)
PROT SERPL-MCNC: 7.7 G/DL (ref 6.8–8.8)
PROT UR-MCNC: 0.29 G/L
PROT/CREAT 24H UR: 0.31 G/G CR (ref 0–0.2)
PTH-INTACT SERPL-MCNC: 108 PG/ML (ref 12–72)
RBC # BLD AUTO: 5.38 10E12/L (ref 4.4–5.9)
SODIUM SERPL-SCNC: 139 MMOL/L (ref 133–144)
TRIGL SERPL-MCNC: 140 MG/DL
WBC # BLD AUTO: 9.1 10E9/L (ref 4–11)

## 2018-02-08 PROCEDURE — 99395 PREV VISIT EST AGE 18-39: CPT | Performed by: INTERNAL MEDICINE

## 2018-02-08 PROCEDURE — 82043 UR ALBUMIN QUANTITATIVE: CPT | Performed by: INTERNAL MEDICINE

## 2018-02-08 PROCEDURE — 83970 ASSAY OF PARATHORMONE: CPT | Performed by: INTERNAL MEDICINE

## 2018-02-08 PROCEDURE — 36415 COLL VENOUS BLD VENIPUNCTURE: CPT | Performed by: INTERNAL MEDICINE

## 2018-02-08 PROCEDURE — 84100 ASSAY OF PHOSPHORUS: CPT | Performed by: INTERNAL MEDICINE

## 2018-02-08 PROCEDURE — 85025 COMPLETE CBC W/AUTO DIFF WBC: CPT | Performed by: INTERNAL MEDICINE

## 2018-02-08 PROCEDURE — 80048 BASIC METABOLIC PNL TOTAL CA: CPT | Performed by: INTERNAL MEDICINE

## 2018-02-08 PROCEDURE — 80061 LIPID PANEL: CPT | Performed by: INTERNAL MEDICINE

## 2018-02-08 PROCEDURE — 84156 ASSAY OF PROTEIN URINE: CPT | Performed by: INTERNAL MEDICINE

## 2018-02-08 PROCEDURE — 80076 HEPATIC FUNCTION PANEL: CPT | Performed by: INTERNAL MEDICINE

## 2018-02-08 PROCEDURE — 82306 VITAMIN D 25 HYDROXY: CPT | Performed by: INTERNAL MEDICINE

## 2018-02-08 RX ORDER — BUPROPION HYDROCHLORIDE 300 MG/1
1 TABLET ORAL DAILY
Refills: 0 | COMMUNITY
Start: 2018-01-26 | End: 2018-08-01

## 2018-02-08 RX ORDER — PROPRANOLOL HCL 60 MG
60 CAPSULE, EXTENDED RELEASE 24HR ORAL DAILY
Qty: 90 CAPSULE | Refills: 3 | Status: SHIPPED | OUTPATIENT
Start: 2018-02-08 | End: 2019-02-26

## 2018-02-08 ASSESSMENT — ASTHMA QUESTIONNAIRES
ACT_TOTALSCORE: 22
QUESTION_3 LAST FOUR WEEKS HOW OFTEN DID YOUR ASTHMA SYMPTOMS (WHEEZING, COUGHING, SHORTNESS OF BREATH, CHEST TIGHTNESS OR PAIN) WAKE YOU UP AT NIGHT OR EARLIER THAN USUAL IN THE MORNING: NOT AT ALL
QUESTION_4 LAST FOUR WEEKS HOW OFTEN HAVE YOU USED YOUR RESCUE INHALER OR NEBULIZER MEDICATION (SUCH AS ALBUTEROL): NOT AT ALL
QUESTION_2 LAST FOUR WEEKS HOW OFTEN HAVE YOU HAD SHORTNESS OF BREATH: ONCE A DAY
QUESTION_5 LAST FOUR WEEKS HOW WOULD YOU RATE YOUR ASTHMA CONTROL: COMPLETELY CONTROLLED
QUESTION_1 LAST FOUR WEEKS HOW MUCH OF THE TIME DID YOUR ASTHMA KEEP YOU FROM GETTING AS MUCH DONE AT WORK, SCHOOL OR AT HOME: NONE OF THE TIME

## 2018-02-08 NOTE — PATIENT INSTRUCTIONS
Preventive Health Recommendations  Male Ages 26 - 39    Yearly exam:             See your health care provider every year in order to  o   Review health changes.   o   Discuss preventive care.    o   Review your medicines if your doctor has prescribed any.    You should be tested each year for STDs (sexually transmitted diseases), if you re at risk.     After age 35, talk to your provider about cholesterol testing. If you are at risk for heart disease, have your cholesterol tested at least every 5 years.     If you are at risk for diabetes, you should have a diabetes test (fasting glucose).  Shots: Get a flu shot each year. Get a tetanus shot every 10 years.     Nutrition:    Eat at least 5 servings of fruits and vegetables daily.     Eat whole-grain bread, whole-wheat pasta and brown rice instead of white grains and rice.     Talk to your provider about Calcium and Vitamin D.     Lifestyle    Exercise for at least 150 minutes a week (30 minutes a day, 5 days a week). This will help you control your weight and prevent disease.     Limit alcohol to one drink per day.     No smoking.     Wear sunscreen to prevent skin cancer.     See your dentist every six months for an exam and cleaning.   singulair consider if exercise symptoms present

## 2018-02-08 NOTE — PROGRESS NOTES
SUBJECTIVE:   CC: Abelino Gracia is an 33 year old male who presents for preventative health visit.     Physical   Annual:     Getting at least 3 servings of Calcium per day::  Yes    Bi-annual eye exam::  NO    Dental care twice a year::  Yes    Sleep apnea or symptoms of sleep apnea::  Sleep apnea    Diet::  Regular (no restrictions)    Frequency of exercise::  2-3 days/week    Duration of exercise::  Less than 15 minutes    Taking medications regularly::  Yes    Medication side effects::  Not applicable    Additional concerns today::  No            Rarely use albuterol.  Exercise.  Smoking.   Just started wellbutyrin  Seeing psychia  Inderal 60 mg migraines  Kidneys from ibuprofen  CPAP not tolerated.  Clinical study colonoscopy        Today's PHQ-2 Score:   PHQ-2 ( 1999 Pfizer) 2/5/2018   Q1: Little interest or pleasure in doing things 1   Q2: Feeling down, depressed or hopeless 0   PHQ-2 Score 1   Q1: Little interest or pleasure in doing things Several days   Q2: Feeling down, depressed or hopeless Not at all   PHQ-2 Score 1       Abuse: Current or Past(Physical, Sexual or Emotional)- No  Do you feel safe in your environment - Yes    Social History   Substance Use Topics     Smoking status: Former Smoker     Packs/day: 1.00     Years: 20.00     Types: Cigarettes     Start date: 1/1/1998     Quit date: 3/5/2017     Smokeless tobacco: Never Used      Comment: Have tried chantix     Alcohol use No      Comment: SOBER SINCE JANUARY 2009     Alcohol Use 2/5/2018   If you drink alcohol, do you typically have greater than 3 drinks per day OR greater than 7 drinks per week?   Not applicable   No flowsheet data found.    Last PSA: No results found for: PSA    Reviewed orders with patient. Reviewed health maintenance and updated orders accordingly - Yes  Labs reviewed in EPIC  BP Readings from Last 3 Encounters:   02/08/18 126/81   08/21/17 119/86   03/10/17 129/80    Wt Readings from Last 3 Encounters:   02/08/18  253 lb (114.8 kg)   17 240 lb 3.2 oz (109 kg)   03/10/17 205 lb (93 kg)                  Patient Active Problem List   Diagnosis     Major depressive disorder     Insomnia     Migraine headache     Asthma, mild intermittent     DESHAUN (obstructive sleep apnea)- mild (AHI 5)     CARDIOVASCULAR SCREENING; LDL GOAL LESS THAN 160     CKD (chronic kidney disease) stage 3, GFR 30-59 ml/min     Leukocytosis     QURESHI (nonalcoholic steatohepatitis)     BMI 31.0-31.9,adult     Hypophosphatemia     Alcohol dependence in remission (H)     Generalized anxiety disorder     Past Surgical History:   Procedure Laterality Date     APPENDECTOMY  2004     C NONSPECIFIC PROCEDURE   2011     Laparoscopic cholecystectomy.     CHOLECYSTECTOMY       COLONOSCOPY N/A 2016    Procedure: COLONOSCOPY;  Surgeon: Mauro Tan MD;  Location:  GI     TONSILLECTOMY  2006       Social History   Substance Use Topics     Smoking status: Former Smoker     Packs/day: 1.00     Years: 20.00     Types: Cigarettes     Start date: 1998     Quit date: 3/5/2017     Smokeless tobacco: Never Used      Comment: Have tried chantix     Alcohol use No      Comment: SOBER SINCE 2009     Family History   Problem Relation Age of Onset     Allergies Mother      Allergies Brother      HEART DISEASE Maternal Grandfather      Hypertension Maternal Grandfather      Asthma Other      Cancer - colorectal Maternal Grandmother 80     CANCER Maternal Grandmother 40     uterine cancer     KIDNEY DISEASE Maternal Grandmother       of kidney failure - ~ age 80     DIABETES Maternal Grandmother      Colon Cancer Maternal Grandmother      Other Cancer Maternal Grandmother          Current Outpatient Prescriptions   Medication Sig Dispense Refill     buPROPion (WELLBUTRIN XL) 300 MG 24 hr tablet Take 1 tablet by mouth daily  0     propranolol (INDERAL LA) 60 MG 24 hr capsule Take 1 capsule (60 mg) by mouth daily 90 capsule 3     losartan  "(COZAAR) 25 MG tablet Take 0.5 tablets (12.5 mg) by mouth daily 45 tablet 3     albuterol (ALBUTEROL) 108 (90 BASE) MCG/ACT Inhaler Inhale 2 puffs into the lungs every 4 hours as needed 2 Inhaler 3     cetirizine (ZYRTEC) 10 MG tablet Take 10 mg by mouth       albuterol (2.5 MG/3ML) 0.083% nebulizer solution Take 1 vial (2.5 mg) by nebulization every 4 hours as needed 360 mL 3     fluticasone (FLONASE) 50 MCG/ACT nasal spray Spray 1-2 sprays into both nostrils daily 1 Package 11     Acetaminophen (TYLENOL PO) Take by mouth as needed daily       Fexofenadine HCl (ALLEGRA PO) Take 60 mg by mouth daily        cholecalciferol (VITAMIN D) 1000 UNIT tablet Take 1 tablet (1,000 Units) by mouth daily Start after Ergocalciferol therapy complete. (Patient not taking: Reported on 2/8/2018) 100 tablet 3       Reviewed and updated as needed this visit by clinical staff  Tobacco  Allergies  Meds  Med Hx  Surg Hx  Fam Hx  Soc Hx        Reviewed and updated as needed this visit by Provider            Review of Systems  C: NEGATIVE for fever, chills, change in weight  I: NEGATIVE for worrisome rashes, moles or lesions  E: NEGATIVE for vision changes or irritation  ENT: NEGATIVE for ear, mouth and throat problems  R: NEGATIVE for significant cough or SOB  CV: NEGATIVE for chest pain, palpitations or peripheral edema  GI: NEGATIVE for nausea, abdominal pain, heartburn, or change in bowel habits   male: negative for dysuria, hematuria, decreased urinary stream, erectile dysfunction, urethral discharge  M: NEGATIVE for significant arthralgias or myalgia  N: NEGATIVE for weakness, dizziness or paresthesias  P: NEGATIVE for changes in mood or affect    OBJECTIVE:   /81 (BP Location: Right arm, Patient Position: Chair, Cuff Size: Adult Large)  Pulse 80  Temp 96.9  F (36.1  C) (Oral)  Ht 5' 10\" (1.778 m)  Wt 253 lb (114.8 kg)  SpO2 97%  BMI 36.3 kg/m2    Physical Exam  GENERAL: healthy, alert and no distress  NECK: no " "adenopathy, no asymmetry, masses, or scars and thyroid normal to palpation  RESP: lungs clear to auscultation - no rales, rhonchi or wheezes  CV: regular rate and rhythm, normal S1 S2, no S3 or S4, no murmur, click or rub, no peripheral edema and peripheral pulses strong  ABDOMEN: soft, nontender, no hepatosplenomegaly, no masses and bowel sounds normal  MS: no gross musculoskeletal defects noted, no edema    ASSESSMENT/PLAN:       ICD-10-CM    1. QURESHI (nonalcoholic steatohepatitis) K75.81 Hepatic panel (Albumin, ALT, AST, Bili, Alk Phos, TP)   2. Migraine with aura and without status migrainosus, not intractable G43.109 propranolol (INDERAL LA) 60 MG 24 hr capsule   3. CKD (chronic kidney disease) stage 3, GFR 30-59 ml/min N18.3 Basic metabolic panel     CBC with platelets and differential     Phosphorus     Albumin Random Urine Quantitative with Creat Ratio     Protein  random urine with Creat Ratio     Creatinine random urine     Parathyroid Hormone Intact   4. CARDIOVASCULAR SCREENING; LDL GOAL LESS THAN 100 Z13.6 Lipid panel reflex to direct LDL Fasting   5. Vitamin D deficiency E55.9 Vitamin D Deficiency       COUNSELING:   Reviewed preventive health counseling, as reflected in patient instructions       Regular exercise       Healthy diet/nutrition       Vision screening       Hearing screening       Safe sex practices/STD prevention         reports that he quit smoking about 11 months ago. His smoking use included Cigarettes. He started smoking about 20 years ago. He has a 20.00 pack-year smoking history. He has never used smokeless tobacco.    Estimated body mass index is 36.3 kg/(m^2) as calculated from the following:    Height as of this encounter: 5' 10\" (1.778 m).    Weight as of this encounter: 253 lb (114.8 kg).   Weight management plan: Discussed healthy diet and exercise guidelines and patient will follow up in 6 months in clinic to re-evaluate.    ICD-10-CM    1. QURESHI (nonalcoholic steatohepatitis) " K75.81 Hepatic panel (Albumin, ALT, AST, Bili, Alk Phos, TP)   2. Migraine with aura and without status migrainosus, not intractable G43.109 propranolol (INDERAL LA) 60 MG 24 hr capsule   3. CKD (chronic kidney disease) stage 3, GFR 30-59 ml/min N18.3 Basic metabolic panel     CBC with platelets and differential     Phosphorus     Albumin Random Urine Quantitative with Creat Ratio     Protein  random urine with Creat Ratio     Creatinine random urine     Parathyroid Hormone Intact   4. CARDIOVASCULAR SCREENING; LDL GOAL LESS THAN 100 Z13.6 Lipid panel reflex to direct LDL Fasting   5. Vitamin D deficiency E55.9 Vitamin D Deficiency       Counseling Resources:  ATP IV Guidelines  Pooled Cohorts Equation Calculator  FRAX Risk Assessment  ICSI Preventive Guidelines  Dietary Guidelines for Americans, 2010  USDA's MyPlate  ASA Prophylaxis  Lung CA Screening    Roberth Tavares MD  [facility]

## 2018-02-08 NOTE — MR AVS SNAPSHOT
After Visit Summary   2/8/2018    Abelino Gracia    MRN: [id number]           Patient Information     Date Of Birth          1985        Visit Information        Provider Department      2/8/2018 8:20 AM Roberth Tavares MD [facility]        Today's Diagnoses     QURESHI (nonalcoholic steatohepatitis)    -  1    Migraine with aura and without status migrainosus, not intractable        CKD (chronic kidney disease) stage 3, GFR 30-59 ml/min        CARDIOVASCULAR SCREENING; LDL GOAL LESS THAN 100        Vitamin D deficiency          Care Instructions      Preventive Health Recommendations  Male Ages 26 - 39    Yearly exam:             See your health care provider every year in order to  o   Review health changes.   o   Discuss preventive care.    o   Review your medicines if your doctor has prescribed any.    You should be tested each year for STDs (sexually transmitted diseases), if you re at risk.     After age 35, talk to your provider about cholesterol testing. If you are at risk for heart disease, have your cholesterol tested at least every 5 years.     If you are at risk for diabetes, you should have a diabetes test (fasting glucose).  Shots: Get a flu shot each year. Get a tetanus shot every 10 years.     Nutrition:    Eat at least 5 servings of fruits and vegetables daily.     Eat whole-grain bread, whole-wheat pasta and brown rice instead of white grains and rice.     Talk to your provider about Calcium and Vitamin D.     Lifestyle    Exercise for at least 150 minutes a week (30 minutes a day, 5 days a week). This will help you control your weight and prevent disease.     Limit alcohol to one drink per day.     No smoking.     Wear sunscreen to prevent skin cancer.     See your dentist every six months for an exam and cleaning.   singulair consider if exercise symptoms present            Follow-ups after your visit        Your next 10 appointments already  scheduled     Feb 21, 2018  7:00 AM CST   [facility]    [address] [facility] [id number] [phone]           Please do not eat 10-12 hours before your appointment if you are coming in fasting for labs on lipids, cholesterol, or glucose (sugar). This does not apply to pregnant women. Water, hot tea and black coffee (with nothing added) are okay. Do not drink other fluids, diet soda or chew gum.            Aug 21, 2018  7:30 AM CDT   LAB with LAB FIRST FLOOR [facility] ([facility])    [address] [place] [phone]           Please do not eat 10-12 hours before your appointment if you are coming in fasting for labs on lipids, cholesterol, or glucose (sugar). This does not apply to pregnant women. Water, hot tea and black coffee (with nothing added) are okay. Do not drink other fluids, diet soda or chew gum.            Aug 21, 2018  8:00 AM CDT   Return Visit with Amina Mena MD   [facility] ([facility])    [address] [place] [phone]              Who to contact     If you have questions or need follow up information about today's clinic visit or your schedule please contact [facility] directly at [phone].  Normal or non-critical lab and imaging results will be communicated to you by MyChart, letter or phone within 4 business days after the clinic has received the results. If you do not hear from us within 7 days, please contact the clinic through MyChart or phone. If you have a critical or abnormal lab result, we will notify you by phone as soon as possible.  Submit refill requests through [employer] or call your pharmacy and they will forward the refill request to us. Please allow 3 business days for your refill to be completed.     "      Additional Information About Your Visit        MyChart Information     eTipping gives you secure access to your electronic health record. If you see a primary care provider, you can also send messages to your care team and make appointments. If you have questions, please call your primary care clinic.  If you do not have a primary care provider, please call [phone] and they will assist you.        Care EveryWhere ID     This is your Care EveryWhere ID. This could be used by other organizations to access your [place] medical records  [id number]        Your Vitals Were     Pulse Temperature Height Pulse Oximetry BMI (Body Mass Index)       80 96.9  F (36.1  C) (Oral) 5' 10\" (1.778 m) 97% 36.3 kg/m2        Blood Pressure from Last 3 Encounters:   02/08/18 126/81   08/21/17 119/86   03/10/17 129/80    Weight from Last 3 Encounters:   02/08/18 253 lb (114.8 kg)   08/21/17 240 lb 3.2 oz (109 kg)   03/10/17 205 lb (93 kg)              We Performed the Following     Albumin Random Urine Quantitative with Creat Ratio     Basic metabolic panel     CBC with platelets and differential     Creatinine random urine     Hepatic panel (Albumin, ALT, AST, Bili, Alk Phos, TP)     Lipid panel reflex to direct LDL Fasting     Parathyroid Hormone Intact     Phosphorus     Protein  random urine with Creat Ratio     Vitamin D Deficiency          Where to get your medicines      These medications were sent to [id number] - [place], [place] - [address] AT [facility] [address]  [address], [facility] [address]     Phone:  [phone]     propranolol 60 MG 24 hr capsule          Primary Care Provider Office Phone # Fax #    Abdias Garcia [phone]       [address]  [place], [place]. \[facility] and Outlook.\"" [id number]        Equal Access to Services     NICOLETTE RODRIGUES AH: Jenni Shaffer, wanamrata gardiner, qaybta kaalmaflip esteban. So [facility] " [phone].    ATENCIÓN: Si amy torres, tiene a soto disposición servicios gratuitos de asistencia lingüística. Sabiha serra [phone].    We comply with applicable federal civil rights laws and [place] laws. We do not discriminate on the basis of race, color, national origin, age, disability, sex, sexual orientation, or gender identity.            Thank you!     Thank you for choosing [facility]  for your care. Our goal is always to provide you with excellent care. Hearing back from our patients is one way we can continue to improve our services. Please take a few minutes to complete the written survey that you may receive in the mail after your visit with us. Thank you!             Your Updated Medication List - Protect others around you: Learn how to safely use, store and throw away your medicines at www.disposemymeds.org.          This list is accurate as of 2/8/18  9:11 AM.  Always use your most recent med list.                   Brand Name Dispense Instructions for use Diagnosis    * albuterol (2.5 MG/3ML) 0.083% neb solution     360 mL    Take 1 vial (2.5 mg) by nebulization every 4 hours as needed    Mild intermittent asthma without complication       * albuterol 108 (90 BASE) MCG/ACT Inhaler    PROAIR HFA    2 Inhaler    Inhale 2 puffs into the lungs every 4 hours as needed    Mild intermittent asthma without complication       ALLEGRA PO      Take 60 mg by mouth daily        buPROPion 300 MG 24 hr tablet    WELLBUTRIN XL     Take 1 tablet by mouth daily        cetirizine 10 MG tablet    zyrTEC     Take 10 mg by mouth        cholecalciferol 1000 UNIT tablet    vitamin D3    100 tablet    Take 1 tablet (1,000 Units) by mouth daily Start after Ergocalciferol therapy complete.    Vitamin D deficiency       FLONASE 50 MCG/ACT spray   Generic drug:  fluticasone     1 Package    Spray 1-2 sprays into both nostrils daily        losartan 25 MG tablet    COZAAR    45 tablet    Take 0.5  tablets (12.5 mg) by mouth daily    CKD (chronic kidney disease) stage 3, GFR 30-59 ml/min       propranolol 60 MG 24 hr capsule    INDERAL LA    90 capsule    Take 1 capsule (60 mg) by mouth daily    Migraine with aura and without status migrainosus, not intractable       TYLENOL PO      Take by mouth as needed daily        * Notice:  This list has 2 medication(s) that are the same as other medications prescribed for you. Read the directions carefully, and ask your doctor or other care provider to review them with you.

## 2018-02-08 NOTE — NURSING NOTE
"Chief Complaint   Patient presents with     Physical       Initial /81 (BP Location: Right arm, Patient Position: Chair, Cuff Size: Adult Large)  Pulse 80  Temp 96.9  F (36.1  C) (Oral)  Ht 5' 10\" (1.778 m)  Wt 253 lb (114.8 kg)  SpO2 97%  BMI 36.3 kg/m2 Estimated body mass index is 36.3 kg/(m^2) as calculated from the following:    Height as of this encounter: 5' 10\" (1.778 m).    Weight as of this encounter: 253 lb (114.8 kg).  Medication Reconciliation: complete   Mary Jane Solis MA      "

## 2018-02-09 LAB — DEPRECATED CALCIDIOL+CALCIFEROL SERPL-MC: 15 UG/L (ref 20–75)

## 2018-02-09 ASSESSMENT — ASTHMA QUESTIONNAIRES: ACT_TOTALSCORE: 22

## 2018-02-14 DIAGNOSIS — N18.30 CKD (CHRONIC KIDNEY DISEASE) STAGE 3, GFR 30-59 ML/MIN (H): ICD-10-CM

## 2018-02-14 RX ORDER — LOSARTAN POTASSIUM 25 MG/1
12.5 TABLET ORAL DAILY
Qty: 45 TABLET | Refills: 3 | Status: SHIPPED | OUTPATIENT
Start: 2018-02-14 | End: 2019-02-27

## 2018-02-14 NOTE — TELEPHONE ENCOUNTER
[facility] [place] [facility]- take 12.5 mg daily.     Reviewed medical record. Refilled medication.    Ruthie Acevedo, RN, BSN  Nephrology Care Coordinator  [facility]

## 2018-02-19 RX ORDER — ERGOCALCIFEROL 1.25 MG/1
50000 CAPSULE, LIQUID FILLED ORAL
Qty: 4 CAPSULE | Refills: 0 | Status: SHIPPED | OUTPATIENT
Start: 2018-02-19 | End: 2018-03-13

## 2018-02-20 DIAGNOSIS — N18.30 CKD (CHRONIC KIDNEY DISEASE) STAGE 3, GFR 30-59 ML/MIN (H): Primary | ICD-10-CM

## 2018-02-21 DIAGNOSIS — N25.81 SECONDARY RENAL HYPERPARATHYROIDISM (H): Primary | ICD-10-CM

## 2018-08-01 ENCOUNTER — OFFICE VISIT (OUTPATIENT)
Dept: FAMILY MEDICINE | Facility: CLINIC | Age: 33
End: 2018-08-01
Payer: COMMERCIAL

## 2018-08-01 VITALS
BODY MASS INDEX: 36.59 KG/M2 | OXYGEN SATURATION: 99 % | WEIGHT: 255 LBS | SYSTOLIC BLOOD PRESSURE: 126 MMHG | DIASTOLIC BLOOD PRESSURE: 86 MMHG | TEMPERATURE: 98.6 F | HEART RATE: 85 BPM

## 2018-08-01 DIAGNOSIS — E66.01 MORBID OBESITY (H): ICD-10-CM

## 2018-08-01 DIAGNOSIS — J45.20 MILD INTERMITTENT ASTHMA WITHOUT COMPLICATION: Chronic | ICD-10-CM

## 2018-08-01 DIAGNOSIS — F41.9 MODERATE ANXIETY: ICD-10-CM

## 2018-08-01 DIAGNOSIS — Z11.4 SCREENING FOR HIV (HUMAN IMMUNODEFICIENCY VIRUS): Primary | ICD-10-CM

## 2018-08-01 PROCEDURE — 99214 OFFICE O/P EST MOD 30 MIN: CPT | Performed by: INTERNAL MEDICINE

## 2018-08-01 RX ORDER — ACETAMINOPHEN 325 MG/1
325 TABLET ORAL PRN
COMMUNITY

## 2018-08-01 RX ORDER — BUPROPION HYDROCHLORIDE 150 MG/1
TABLET ORAL
Refills: 0 | COMMUNITY
Start: 2018-01-03 | End: 2018-08-01

## 2018-08-01 RX ORDER — ALBUTEROL SULFATE 0.83 MG/ML
2.5 SOLUTION RESPIRATORY (INHALATION) PRN
COMMUNITY
Start: 2015-03-24 | End: 2019-07-30

## 2018-08-01 RX ORDER — BUPROPION HYDROCHLORIDE 300 MG/1
300 TABLET ORAL DAILY
Qty: 90 TABLET | Refills: 3 | Status: SHIPPED | OUTPATIENT
Start: 2018-08-01 | End: 2019-07-30

## 2018-08-01 ASSESSMENT — ANXIETY QUESTIONNAIRES
1. FEELING NERVOUS, ANXIOUS, OR ON EDGE: SEVERAL DAYS
IF YOU CHECKED OFF ANY PROBLEMS ON THIS QUESTIONNAIRE, HOW DIFFICULT HAVE THESE PROBLEMS MADE IT FOR YOU TO DO YOUR WORK, TAKE CARE OF THINGS AT HOME, OR GET ALONG WITH OTHER PEOPLE: SOMEWHAT DIFFICULT
GAD7 TOTAL SCORE: 6
7. FEELING AFRAID AS IF SOMETHING AWFUL MIGHT HAPPEN: SEVERAL DAYS
3. WORRYING TOO MUCH ABOUT DIFFERENT THINGS: SEVERAL DAYS
6. BECOMING EASILY ANNOYED OR IRRITABLE: SEVERAL DAYS
5. BEING SO RESTLESS THAT IT IS HARD TO SIT STILL: NOT AT ALL
2. NOT BEING ABLE TO STOP OR CONTROL WORRYING: SEVERAL DAYS

## 2018-08-01 ASSESSMENT — PATIENT HEALTH QUESTIONNAIRE - PHQ9: 5. POOR APPETITE OR OVEREATING: SEVERAL DAYS

## 2018-08-01 NOTE — MR AVS SNAPSHOT
After Visit Summary   8/1/2018    Abelino Gracia    MRN: [id number]           Patient Information     Date Of Birth          1985        Visit Information        Provider Department      8/1/2018 10:00 AM Roberth Tavares MD [facility]        Today's Diagnoses     Screening for HIV (human immunodeficiency virus)    -  1    Moderate anxiety        Morbid obesity (H)        Mild intermittent asthma without complication           Follow-ups after your visit        Your next 10 appointments already scheduled     Aug 21, 2018  7:30 AM CDT   LAB with LAB FIRST FLOOR [facility] ([facility])    [address] [place] [phone]           Please do not eat 10-12 hours before your appointment if you are coming in fasting for labs on lipids, cholesterol, or glucose (sugar). This does not apply to pregnant women. Water, hot tea and black coffee (with nothing added) are okay. Do not drink other fluids, diet soda or chew gum.            Aug 21, 2018  8:00 AM CDT   Return Visit with Amina Mena MD   [facility]    [address] [place] [phone]              Who to contact     If you have questions or need follow up information about today's clinic visit or your schedule please contact [facility] directly at [phone].  Normal or non-critical lab and imaging results will be communicated to you by MyChart, letter or phone within 4 business days after the clinic has received the results. If you do not hear from us within 7 days, please contact the clinic through MyChart or phone. If you have a critical or abnormal lab result, we will notify you by phone as soon as possible.  Submit refill requests through [employer] or call your pharmacy and they will forward the refill request to us. Please allow 3  business days for your refill to be completed.          Additional Information About Your Visit        MyChart Information     [employer] gives you secure access to your electronic health record. If you see a primary care provider, you can also send messages to your care team and make appointments. If you have questions, please call your primary care clinic.  If you do not have a primary care provider, please call [phone] and they will assist you.        Care EveryWhere ID     This is your Care EveryWhere ID. This could be used by other organizations to access your [place] medical records  [id number]        Your Vitals Were     Pulse Temperature Pulse Oximetry BMI (Body Mass Index)          85 98.6  F (37  C) (Oral) 99% 36.59 kg/m2         Blood Pressure from Last 3 Encounters:   08/01/18 126/86   02/08/18 126/81   08/21/17 119/86    Weight from Last 3 Encounters:   08/01/18 255 lb (115.7 kg)   02/08/18 253 lb (114.8 kg)   08/21/17 240 lb 3.2 oz (109 kg)              We Performed the Following     Asthma Action Plan (AAP)          Where to get your medicines      These medications were sent to [facility] [id number] [place], [place] [address] [facility] [address]  [address] [facility] [address]     Phone:  [phone]     buPROPion 300 MG 24 hr tablet          Primary Care Provider Office Phone # Fax #    Abdias Garcia [phone] [address] [facility] [address]        Equal Access to Services     NICOLETTE RODRIGUES : Hadii aad ku hadasho Soserenity, waaxda luqadaha, qaybta kaalmada stephan, flip dubois. So [facility] [phone].    ATENCIÓN: Si habla español, tiene a soto disposición servicios gratuitos de asistencia lingüística. Llame al [phone].    We comply with applicable federal civil rights laws and [place] laws. We do not discriminate on the basis of race, color, national origin, age, disability, sex,  sexual orientation, or gender identity.            Thank you!     Thank you for choosing [facility]  for your care. Our goal is always to provide you with excellent care. Hearing back from our patients is one way we can continue to improve our services. Please take a few minutes to complete the written survey that you may receive in the mail after your visit with us. Thank you!             Your Updated Medication List - Protect others around you: Learn how to safely use, store and throw away your medicines at www.disposemymeds.org.          This list is accurate as of 8/1/18 10:20 AM.  Always use your most recent med list.                   Brand Name Dispense Instructions for use Diagnosis    * albuterol (2.5 MG/3ML) 0.083% neb solution      Inhale 2.5 mg into the lungs        * albuterol 108 (90 Base) MCG/ACT Inhaler    PROAIR HFA    2 Inhaler    Inhale 2 puffs into the lungs every 4 hours as needed    Mild intermittent asthma without complication       ALLEGRA PO      Take 60 mg by mouth daily        buPROPion 300 MG 24 hr tablet    WELLBUTRIN XL    90 tablet    Take 1 tablet (300 mg) by mouth daily    Moderate anxiety       cetirizine 10 MG tablet    zyrTEC     Take 10 mg by mouth        cholecalciferol 1000 UNIT tablet    vitamin D3    100 tablet    Take 1 tablet (1,000 Units) by mouth daily Start after Ergocalciferol therapy complete.    Vitamin D deficiency       FLONASE 50 MCG/ACT spray   Generic drug:  fluticasone     1 Package    Spray 1-2 sprays into both nostrils daily        losartan 25 MG tablet    COZAAR    45 tablet    Take 0.5 tablets (12.5 mg) by mouth daily    CKD (chronic kidney disease) stage 3, GFR 30-59 ml/min       propranolol 60 MG 24 hr capsule    INDERAL LA    90 capsule    Take 1 capsule (60 mg) by mouth daily    Migraine with aura and without status migrainosus, not intractable       * TYLENOL PO      Take by mouth as needed daily        * acetaminophen 325 MG  tablet    TYLENOL     Take 325 mg by mouth        * Notice:  This list has 4 medication(s) that are the same as other medications prescribed for you. Read the directions carefully, and ask your doctor or other care provider to review them with you.

## 2018-08-01 NOTE — PROGRESS NOTES
SUBJECTIVE:                                                    Abelino Gracia is a 33 year old male who presents to clinic today for the following health issues:      Depression and Anxiety Follow-Up    Status since last visit: Improved     Other associated symptoms:None    Complicating factors:     Significant life event: No     Current substance abuse: None    PHQ-9 8/1/2018   Total Score 3   Q9: Suicide Ideation Not at all     HOUSTON-7 SCORE 8/1/2018   Total Score 6     In the past two weeks have you had thoughts of suicide or self-harm?  No.    Do you have concerns about your personal safety or the safety of others?   No  PHQ-9  English  PHQ-9   Any Language  HOUSTON-7  Suicide Assessment Five-step Evaluation and Treatment (SAFE-T)    Amount of exercise or physical activity: None    Problems taking medications regularly: No    Medication side effects: none    Diet: regular (no restrictions)    Asthma at [facility].  But otherwise, no symptoms            Problem list and histories reviewed & adjusted, as indicated.  Additional history: as documented    Patient Active Problem List   Diagnosis     Major depressive disorder     Insomnia     Migraine headache     Asthma, mild intermittent     DESHAUN (obstructive sleep apnea)- mild (AHI 5)     CARDIOVASCULAR SCREENING; LDL GOAL LESS THAN 160     CKD (chronic kidney disease) stage 3, GFR 30-59 ml/min     Leukocytosis     QURESHI (nonalcoholic steatohepatitis)     BMI 31.0-31.9,adult     Hypophosphatemia     Alcohol dependence in remission (H)     Generalized anxiety disorder     Morbid obesity (H)     Past Surgical History:   Procedure Laterality Date     APPENDECTOMY  08/25/2004     C NONSPECIFIC PROCEDURE   Feb 2011     Laparoscopic cholecystectomy.     CHOLECYSTECTOMY       COLONOSCOPY N/A 11/14/2016    Procedure: COLONOSCOPY;  Surgeon: Mauro Tan MD;  Location:  GI     TONSILLECTOMY  06/08/2006       Social History   Substance Use Topics     Smoking status:  Former Smoker     Packs/day: 1.00     Years: 20.00     Types: Cigarettes     Start date: 1998     Quit date: 3/5/2017     Smokeless tobacco: Never Used      Comment: Have tried chantix     Alcohol use No      Comment: SOBER SINCE 2009     Family History   Problem Relation Age of Onset     Allergies Mother      Allergies Brother      HEART DISEASE Maternal Grandfather      Hypertension Maternal Grandfather      Other Cancer Maternal Grandfather      Asthma Other      Cancer - colorectal Maternal Grandmother 80     Cancer Maternal Grandmother 40     uterine cancer     KIDNEY DISEASE Maternal Grandmother       of kidney failure - ~ age 80     Diabetes Maternal Grandmother      Colon Cancer Maternal Grandmother      Other Cancer Maternal Grandmother          Current Outpatient Prescriptions   Medication Sig Dispense Refill     Acetaminophen (TYLENOL PO) Take by mouth as needed daily       albuterol (2.5 MG/3ML) 0.083% neb solution Inhale 2.5 mg into the lungs       albuterol (ALBUTEROL) 108 (90 BASE) MCG/ACT Inhaler Inhale 2 puffs into the lungs every 4 hours as needed 2 Inhaler 3     buPROPion (WELLBUTRIN XL) 300 MG 24 hr tablet Take 1 tablet (300 mg) by mouth daily 90 tablet 3     cetirizine (ZYRTEC) 10 MG tablet Take 10 mg by mouth       cholecalciferol (VITAMIN D) 1000 UNIT tablet Take 1 tablet (1,000 Units) by mouth daily Start after Ergocalciferol therapy complete. 100 tablet 3     Fexofenadine HCl (ALLEGRA PO) Take 60 mg by mouth daily        fluticasone (FLONASE) 50 MCG/ACT nasal spray Spray 1-2 sprays into both nostrils daily 1 Package 11     losartan (COZAAR) 25 MG tablet Take 0.5 tablets (12.5 mg) by mouth daily 45 tablet 3     propranolol (INDERAL LA) 60 MG 24 hr capsule Take 1 capsule (60 mg) by mouth daily 90 capsule 3     acetaminophen (TYLENOL) 325 MG tablet Take 325 mg by mouth       Allergies   Allergen Reactions     Clarithromycin Hives     Penicillins      Sulfa Drugs      Adhesive  Tape Rash     Recent Labs   Lab Test  02/08/18   0916  08/21/17   0721   07/14/16   1604 06/02/16 03/07/16   0810   08/24/15   0728  05/13/15   0935   03/13/15   0825   07/26/10   1643   A1C   --    --    --    --    --    --    --    --   6.3*   --    --    --    --    LDL  91   --    --    --    --   103*   --    --    --    --   70   --    --    HDL  40   --    --    --    --   35*   --    --    --    --   32*   --    --    TRIG  140   --    --    --    --   122   --    --    --    --   131   --    --    ALT  107*   --    --   70  47*   --    < >   --    --    < >  93*   < >  116*   CR  1.92*  1.59*   < >  1.86*  2.13*   --    < >  1.46*  1.65*   < >  1.90*   < >  1.54*   GFRESTIMATED  40*  50*   < >  42*   --    --    < >  56*  49*   < >  41*   < >  54*   GFRESTBLACK  48*  60*   < >  51*   --    --    < >  67  59*   < >  50*   < >  66   POTASSIUM  4.3  4.3   < >  4.7   --    --    < >  4.6   --    < >  3.9   < >  4.1   TSH   --    --    --    --    --    --    --   1.00   --    --    --    --   0.85    < > = values in this interval not displayed.      BP Readings from Last 3 Encounters:   08/01/18 126/86   02/08/18 126/81   08/21/17 119/86    Wt Readings from Last 3 Encounters:   08/01/18 255 lb (115.7 kg)   02/08/18 253 lb (114.8 kg)   08/21/17 240 lb 3.2 oz (109 kg)                    ROS:  Constitutional, HEENT, cardiovascular, pulmonary, gi and gu systems are negative, except as otherwise noted.    OBJECTIVE:     /86 (BP Location: Left arm, Patient Position: Chair, Cuff Size: Adult Regular)  Pulse 85  Temp 98.6  F (37  C) (Oral)  Wt 255 lb (115.7 kg)  SpO2 99%  BMI 36.59 kg/m2  Body mass index is 36.59 kg/(m^2).  GENERAL: healthy, alert and no distress  EYES: Eyes grossly normal to inspection, PERRL and conjunctivae and sclerae normal  HENT: ear canals and TM's normal, nose and mouth without ulcers or lesions  NECK: no adenopathy, no asymmetry, masses, or scars and thyroid normal to  palpation  RESP: lungs clear to auscultation - no rales, rhonchi or wheezes  CV: regular rate and rhythm, normal S1 S2, no S3 or S4, no murmur, click or rub, no peripheral edema and peripheral pulses strong  ABDOMEN: soft, nontender, no hepatosplenomegaly, no masses and bowel sounds normal  MS: no gross musculoskeletal defects noted, no edema  SKIN: no suspicious lesions or rashes  NEURO: Normal strength and tone, mentation intact and speech normal  BACK: no CVA tenderness, no paralumbar tenderness    Diagnostic Test Results:  Results for orders placed or performed in visit on 02/08/18   Basic metabolic panel   Result Value Ref Range    Sodium 139 133 - 144 mmol/L    Potassium 4.3 3.4 - 5.3 mmol/L    Chloride 108 94 - 109 mmol/L    Carbon Dioxide 20 20 - 32 mmol/L    Anion Gap 11 3 - 14 mmol/L    Glucose 113 (H) 70 - 99 mg/dL    Urea Nitrogen 22 7 - 30 mg/dL    Creatinine 1.92 (H) 0.66 - 1.25 mg/dL    GFR Estimate 40 (L) >60 mL/min/1.7m2    GFR Estimate If Black 48 (L) >60 mL/min/1.7m2    Calcium 9.1 8.5 - 10.1 mg/dL   CBC with platelets and differential   Result Value Ref Range    WBC 9.1 4.0 - 11.0 10e9/L    RBC Count 5.38 4.4 - 5.9 10e12/L    Hemoglobin 16.4 13.3 - 17.7 g/dL    Hematocrit 48.1 40.0 - 53.0 %    MCV 89 78 - 100 fl    MCH 30.5 26.5 - 33.0 pg    MCHC 34.1 31.5 - 36.5 g/dL    RDW 12.8 10.0 - 15.0 %    Platelet Count 308 150 - 450 10e9/L    Diff Method Automated Method     % Neutrophils 61.1 %    % Lymphocytes 20.4 %    % Monocytes 7.8 %    % Eosinophils 9.8 %    % Basophils 0.9 %    Absolute Neutrophil 5.6 1.6 - 8.3 10e9/L    Absolute Lymphocytes 1.9 0.8 - 5.3 10e9/L    Absolute Monocytes 0.7 0.0 - 1.3 10e9/L    Absolute Eosinophils 0.9 (H) 0.0 - 0.7 10e9/L    Absolute Basophils 0.1 0.0 - 0.2 10e9/L   Lipid panel reflex to direct LDL Fasting   Result Value Ref Range    Cholesterol 159 <200 mg/dL    Triglycerides 140 <150 mg/dL    HDL Cholesterol 40 >39 mg/dL    LDL Cholesterol Calculated 91 <100 mg/dL     Non HDL Cholesterol 119 <130 mg/dL   Hepatic panel (Albumin, ALT, AST, Bili, Alk Phos, TP)   Result Value Ref Range    Bilirubin Direct 0.1 0.0 - 0.2 mg/dL    Bilirubin Total 0.7 0.2 - 1.3 mg/dL    Albumin 3.9 3.4 - 5.0 g/dL    Protein Total 7.7 6.8 - 8.8 g/dL    Alkaline Phosphatase 147 40 - 150 U/L     (H) 0 - 70 U/L    AST 45 0 - 45 U/L   Phosphorus   Result Value Ref Range    Phosphorus 2.3 (L) 2.5 - 4.5 mg/dL   Albumin Random Urine Quantitative with Creat Ratio   Result Value Ref Range    Creatinine Urine 95 mg/dL    Albumin Urine mg/L 57 mg/L    Albumin Urine mg/g Cr 59.89 (H) 0 - 17 mg/g Cr   Protein  random urine with Creat Ratio   Result Value Ref Range    Protein Random Urine 0.29 g/L    Protein Total Urine g/gr Creatinine 0.31 (H) 0 - 0.2 g/g Cr   Creatinine random urine   Result Value Ref Range    Creatinine Urine Random 95 mg/dL   Vitamin D Deficiency   Result Value Ref Range    Vitamin D Deficiency screening 15 (L) 20 - 75 ug/L   Parathyroid Hormone Intact   Result Value Ref Range    Parathyroid Hormone Intact 108 (H) 12 - 72 pg/mL       ASSESSMENT/PLAN:       ICD-10-CM    1. Screening for HIV (human immunodeficiency virus) Z11.4    2. Moderate anxiety F41.9 buPROPion (WELLBUTRIN XL) 300 MG 24 hr tablet   3. Morbid obesity (H) E66.01    4. Mild intermittent asthma without complication J45.20 Asthma Action Plan (AAP)           Roberth Tavares MD  [facility]

## 2018-08-02 ASSESSMENT — ANXIETY QUESTIONNAIRES: GAD7 TOTAL SCORE: 6

## 2018-08-02 ASSESSMENT — PATIENT HEALTH QUESTIONNAIRE - PHQ9: SUM OF ALL RESPONSES TO PHQ QUESTIONS 1-9: 3

## 2018-08-02 ASSESSMENT — ASTHMA QUESTIONNAIRES: ACT_TOTALSCORE: 22

## 2018-08-14 DIAGNOSIS — N18.30 CKD (CHRONIC KIDNEY DISEASE) STAGE 3, GFR 30-59 ML/MIN (H): Primary | Chronic | ICD-10-CM

## 2018-08-21 ENCOUNTER — OFFICE VISIT (OUTPATIENT)
Dept: NEPHROLOGY | Facility: CLINIC | Age: 33
End: 2018-08-21
Payer: COMMERCIAL

## 2018-08-21 VITALS
HEART RATE: 88 BPM | OXYGEN SATURATION: 99 % | BODY MASS INDEX: 35.7 KG/M2 | WEIGHT: 255 LBS | HEIGHT: 71 IN | DIASTOLIC BLOOD PRESSURE: 85 MMHG | SYSTOLIC BLOOD PRESSURE: 121 MMHG

## 2018-08-21 DIAGNOSIS — N25.81 SECONDARY RENAL HYPERPARATHYROIDISM (H): ICD-10-CM

## 2018-08-21 DIAGNOSIS — K75.81 NASH (NONALCOHOLIC STEATOHEPATITIS): Chronic | ICD-10-CM

## 2018-08-21 DIAGNOSIS — N18.30 CKD (CHRONIC KIDNEY DISEASE) STAGE 3, GFR 30-59 ML/MIN (H): ICD-10-CM

## 2018-08-21 DIAGNOSIS — R73.09 ELEVATED GLUCOSE: ICD-10-CM

## 2018-08-21 DIAGNOSIS — N18.30 CKD (CHRONIC KIDNEY DISEASE) STAGE 3, GFR 30-59 ML/MIN (H): Primary | Chronic | ICD-10-CM

## 2018-08-21 DIAGNOSIS — K75.81 NASH (NONALCOHOLIC STEATOHEPATITIS): Primary | Chronic | ICD-10-CM

## 2018-08-21 LAB
ALBUMIN SERPL-MCNC: 3.6 G/DL (ref 3.4–5)
ALBUMIN UR-MCNC: NEGATIVE MG/DL
ANION GAP SERPL CALCULATED.3IONS-SCNC: 10 MMOL/L (ref 3–14)
APPEARANCE UR: CLEAR
BILIRUB UR QL STRIP: NEGATIVE
BUN SERPL-MCNC: 31 MG/DL (ref 7–30)
CALCIUM SERPL-MCNC: 9.1 MG/DL (ref 8.5–10.1)
CHLORIDE SERPL-SCNC: 105 MMOL/L (ref 94–109)
CO2 SERPL-SCNC: 23 MMOL/L (ref 20–32)
COLOR UR AUTO: ABNORMAL
CREAT SERPL-MCNC: 1.86 MG/DL (ref 0.66–1.25)
CREAT UR-MCNC: 83 MG/DL
GFR SERPL CREATININE-BSD FRML MDRD: 41 ML/MIN/1.7M2
GLUCOSE SERPL-MCNC: 243 MG/DL (ref 70–99)
GLUCOSE UR STRIP-MCNC: 30 MG/DL
HGB BLD-MCNC: 15.7 G/DL (ref 13.3–17.7)
HGB UR QL STRIP: NEGATIVE
KETONES UR STRIP-MCNC: NEGATIVE MG/DL
LEUKOCYTE ESTERASE UR QL STRIP: NEGATIVE
NITRATE UR QL: NEGATIVE
NON-SQ EPI CELLS #/AREA URNS LPF: ABNORMAL /LPF
PH UR STRIP: 5.5 PH (ref 5–7)
PHOSPHATE SERPL-MCNC: 2.5 MG/DL (ref 2.5–4.5)
POTASSIUM SERPL-SCNC: 4.1 MMOL/L (ref 3.4–5.3)
PROT UR-MCNC: 0.24 G/L
PROT/CREAT 24H UR: 0.28 G/G CR (ref 0–0.2)
PTH-INTACT SERPL-MCNC: 42 PG/ML (ref 18–80)
RBC #/AREA URNS AUTO: ABNORMAL /HPF
SODIUM SERPL-SCNC: 138 MMOL/L (ref 133–144)
SOURCE: ABNORMAL
SP GR UR STRIP: 1.01 (ref 1–1.03)
UROBILINOGEN UR STRIP-MCNC: NORMAL MG/DL (ref 0–2)
WBC #/AREA URNS AUTO: ABNORMAL /HPF

## 2018-08-21 PROCEDURE — 81001 URINALYSIS AUTO W/SCOPE: CPT | Performed by: INTERNAL MEDICINE

## 2018-08-21 PROCEDURE — 85018 HEMOGLOBIN: CPT | Performed by: INTERNAL MEDICINE

## 2018-08-21 PROCEDURE — 82306 VITAMIN D 25 HYDROXY: CPT | Performed by: INTERNAL MEDICINE

## 2018-08-21 PROCEDURE — 99214 OFFICE O/P EST MOD 30 MIN: CPT | Performed by: INTERNAL MEDICINE

## 2018-08-21 PROCEDURE — 84156 ASSAY OF PROTEIN URINE: CPT | Performed by: INTERNAL MEDICINE

## 2018-08-21 PROCEDURE — 80069 RENAL FUNCTION PANEL: CPT | Performed by: INTERNAL MEDICINE

## 2018-08-21 PROCEDURE — 83970 ASSAY OF PARATHORMONE: CPT | Performed by: INTERNAL MEDICINE

## 2018-08-21 PROCEDURE — 36415 COLL VENOUS BLD VENIPUNCTURE: CPT | Performed by: INTERNAL MEDICINE

## 2018-08-21 ASSESSMENT — PAIN SCALES - GENERAL: PAINLEVEL: NO PAIN (0)

## 2018-08-21 NOTE — PATIENT INSTRUCTIONS
1. Recommend appt with hepatology at the [facility]  2. Labs in 6 months  3. Follow-up in one year.

## 2018-08-21 NOTE — NURSING NOTE
"Abelino Gracia's goals for this visit include: return  He requests these members of his care team be copied on today's visit information:     PCP: Roberth Tavares    Referring Provider:  No referring provider defined for this encounter.    /85  Pulse 88  Ht 1.803 m (5' 11\")  Wt 115.7 kg (255 lb)  SpO2 99%  BMI 35.57 kg/m2    Do you need any medication refills at today's visit? n  "

## 2018-08-21 NOTE — MR AVS SNAPSHOT
After Visit Summary   8/21/2018    Abelino Gracia    MRN: [id number]           Patient Information     Date Of Birth          1985        Visit Information        Provider Department      8/21/2018 8:00 AM Amina Mena MD [facility] [[email or web address]]        Today's Diagnoses     CKD (chronic kidney disease) stage 3, GFR 30-59 ml/min    -  1    Secondary renal hyperparathyroidism (H)        QURESHI (nonalcoholic steatohepatitis)          Care Instructions    1. Recommend appt with hepatology at the [place]  2. Labs in 6 months  3. Follow-up in one year.           Follow-ups after your visit        Your next 10 appointments already scheduled     Aug 31, 2018  7:30 AM CDT   (Arrive by 7:15 AM)   New General Liver with Jack Paniagua PA-C   [facility] ([facility])    [address] [place] [id number] [phone]            Feb 18, 2019  7:00 AM CST   LAB with CP LAB   [facility] ([facility])    [address] [place] [id number] [phone]           Please do not eat 10-12 hours before your appointment if you are coming in fasting for labs on lipids, cholesterol, or glucose (sugar). This does not apply to pregnant women. Water, hot tea and black coffee (with nothing added) are okay. Do not drink other fluids, diet soda or chew gum.              Future tests that were ordered for you today     Open Future Orders        Priority Expected Expires Ordered    Renal panel Routine 2/21/2019 8/21/2019 8/21/2018    Protein  random urine with Creat Ratio Routine 2/21/2019 8/21/2019 8/21/2018    ALT Add-On  9/20/2018 8/21/2018    AST Add-On  9/20/2018 8/21/2018    Albumin Random Urine Quantitative with Creat Ratio Add-On  9/20/2018 8/21/2018            Who to contact     If you have questions or need follow up information about today's clinic visit or  "your schedule please contact [facility] directly at [phone].  Normal or non-critical lab and imaging results will be communicated to you by Adsvarkhart, letter or phone within 4 business days after the clinic has received the results. If you do not hear from us within 7 days, please contact the clinic through Adsvarkhart or phone. If you have a critical or abnormal lab result, we will notify you by phone as soon as possible.  Submit refill requests through [employer] or call your pharmacy and they will forward the refill request to us. Please allow 3 business days for your refill to be completed.          Additional Information About Your Visit        [employer] Information     [employer] gives you secure access to your electronic health record. If you see a primary care provider, you can also send messages to your care team and make appointments. If you have questions, please call your primary care clinic.  If you do not have a primary care provider, please call [phone] and they will assist you.      [employer] is an electronic gateway that provides easy, online access to your medical records. With [employer], you can request a clinic appointment, read your test results, renew a prescription or communicate with your care team.     To access your existing account, please contact your [facility] or call [phone] for assistance.        Care EveryWhere ID     This is your Care EveryWhere ID. This could be used by other organizations to access your [place] medical records  [id number]        Your Vitals Were     Pulse Height Pulse Oximetry BMI (Body Mass Index)          88 1.803 m (5' 11\") 99% 35.57 kg/m2         Blood Pressure from Last 3 Encounters:   08/21/18 121/85   08/01/18 126/86   02/08/18 126/81    Weight from Last 3 Encounters:   08/21/18 115.7 kg (255 lb)   08/01/18 115.7 kg (255 lb)   02/08/18 114.8 kg (253 lb)              We Performed the Following     25 " Hydroxyvitamin D2 and D3     UA with Microscopic reflex to Culture        Primary Care Provider Office Phone # Fax #    Roberth Tavares [phone]       [address] [facility] [address]        Equal Access to Services     NICOLETTE RODRIGUES : Jenni ontiveros viktoria Soserenity, waaxda luqadaha, qaybta kaalmada stephan, flip gravesalejandra sewellvandana hung fredy dubois. So [facility] [phone].    ATENCIÓN: Si habla español, tiene a soto disposición servicios gratuitos de asistencia lingüística. Llame al [phone].    We comply with applicable federal civil rights laws and [place] laws. We do not discriminate on the basis of race, color, national origin, age, disability, sex, sexual orientation, or gender identity.            Thank you!     Thank you for choosing [facility]  for your care. Our goal is always to provide you with excellent care. Hearing back from our patients is one way we can continue to improve our services. Please take a few minutes to complete the written survey that you may receive in the mail after your visit with us. Thank you!             Your Updated Medication List - Protect others around you: Learn how to safely use, store and throw away your medicines at www.disposemymeds.org.          This list is accurate as of 8/21/18  8:30 AM.  Always use your most recent med list.                   Brand Name Dispense Instructions for use Diagnosis    acetaminophen 325 MG tablet    TYLENOL     Take 325 mg by mouth as needed        * albuterol (2.5 MG/3ML) 0.083% neb solution      Inhale 2.5 mg into the lungs as needed        * albuterol 108 (90 Base) MCG/ACT inhaler    PROAIR HFA    2 Inhaler    Inhale 2 puffs into the lungs every 4 hours as needed    Mild intermittent asthma without complication       ALLEGRA PO      Take 60 mg by mouth daily as needed        buPROPion 300 MG 24 hr tablet    WELLBUTRIN XL    90 tablet    Take 1 tablet (300 mg) by mouth daily     Moderate anxiety       cetirizine 10 MG tablet    zyrTEC     Take 10 mg by mouth daily as needed        cholecalciferol 1000 UNIT tablet    vitamin D3    100 tablet    Take 1 tablet (1,000 Units) by mouth daily Start after Ergocalciferol therapy complete.    Vitamin D deficiency       FLONASE 50 MCG/ACT spray   Generic drug:  fluticasone     1 Package    Spray 1-2 sprays into both nostrils daily as needed        losartan 25 MG tablet    COZAAR    45 tablet    Take 0.5 tablets (12.5 mg) by mouth daily    CKD (chronic kidney disease) stage 3, GFR 30-59 ml/min       propranolol 60 MG 24 hr capsule    INDERAL LA    90 capsule    Take 1 capsule (60 mg) by mouth daily    Migraine with aura and without status migrainosus, not intractable       * Notice:  This list has 2 medication(s) that are the same as other medications prescribed for you. Read the directions carefully, and ask your doctor or other care provider to review them with you.

## 2018-08-21 NOTE — PROGRESS NOTES
08/21/2018   CHIEF COMPLAINT: CKD     HISTORY OF PRESENT ILLNESS:  Abelino Gracia is a 33 year-old male who has had elevated creatinine levels dating back to 2006 according to [facility] records.  His creatinine has ranged anywhere from 1.3-1.7 in his [facility] records.  He was seen by nephrology on one occasion while inpatient at [facility] in 2009 and his kidney injury at that time was felt to be related to long-standing heavy use of NSAIDs.  To briefly summarize risk factors for CKD: heavy NSAID use in the past, previous etoh abuse (sober since 09). Addt hx includes DESHAUN. His serologic workup including C3, C4, ANCA, Anti-GBM, and immunofixations were normal. He is felt to have QURESHI as the cause of his liver abnormalities.    Today he presents for routine follow-up. His creatinine has been 1.59-1.92 in the past year; 1.86 today. He quit smoking but with that has had weight gain. He is not tolerating CPAP. He is also overdue for follow-up of his QURESHI. He is following with outside psychiatry who also helps to follow-up his hx of etoh abuse. He remains on losartan 12.5 mg daily - no lightheadedness or dizziness.        Past Medical History:   Diagnosis Date     Allergies     cats,dust, pollens     Depressive disorder 2017     Leukocytosis      Panic disorder without agoraphobia      Uncomplicated asthma 1990    Diagnosed as a child      Past Surgical History:   Procedure Laterality Date     APPENDECTOMY  08/25/2004     C NONSPECIFIC PROCEDURE   Feb 2011     Laparoscopic cholecystectomy.     CHOLECYSTECTOMY       COLONOSCOPY N/A 11/14/2016    Procedure: COLONOSCOPY;  Surgeon: Mauro Tan MD;  Location:  GI     TONSILLECTOMY  06/08/2006          SOCIAL HISTORY:  +tobacco  He started smoking between ages [age]. Previous heavy alcohol abuse for which he went through treatment, he has been sober since 2009.  No drug use.  He currently works for a health insurance company.  He does not have children.      REVIEW OF  "SYSTEMS: 4 point ROS obtained, all pertinent positives and negatives in the HPI otherwise all other systems negative.      PHYSICAL EXAMINATION:   Blood pressure 121/85, pulse 88, height 1.803 m (5' 11\"), weight 115.7 kg (255 lb), SpO2 99 %.   GENERAL:  The patient is alert and oriented x3, in no acute distress, appears comfortable, pleasant to speak clearly.   HEART:  Regular rate and rhythm, no murmurs, rubs or gallops.   LUNGS:  Clear to auscultation and no rhonchi, or rales.   EXTREMITIES:  There is no lower extremity pitting edema.   SKIN:  There are no rashes appreciated.   PSYCHIATRIC:  Mood and affect are within normal limits.     Orders Only on 08/21/2018   Component Date Value Ref Range Status     Sodium 08/21/2018 138  133 - 144 mmol/L Final     Potassium 08/21/2018 4.1  3.4 - 5.3 mmol/L Final     Chloride 08/21/2018 105  94 - 109 mmol/L Final     Carbon Dioxide 08/21/2018 23  20 - 32 mmol/L Final     Anion Gap 08/21/2018 10  3 - 14 mmol/L Final     Glucose 08/21/2018 243* 70 - 99 mg/dL Final    Non Fasting     Urea Nitrogen 08/21/2018 31* 7 - 30 mg/dL Final     Creatinine 08/21/2018 1.86* 0.66 - 1.25 mg/dL Final     GFR Estimate 08/21/2018 41* >60 mL/min/1.7m2 Final    Non  GFR Calc     GFR Estimate If Black 08/21/2018 50* >60 mL/min/1.7m2 Final    African American GFR Calc     Calcium 08/21/2018 9.1  8.5 - 10.1 mg/dL Final     Phosphorus 08/21/2018 2.5  2.5 - 4.5 mg/dL Final     Albumin 08/21/2018 3.6  3.4 - 5.0 g/dL Final     Hemoglobin 08/21/2018 15.7  13.3 - 17.7 g/dL Final          ASSESSMENT AND PLAN:   1.  CKD Stage 3:  His biggest risk factor for kidney disease is related to long-standing NSAID use.  He has had a very low level of proteinuria with minimal albuminuria - suggests potentially a tubular injury. NSAIDs would fit with this finding. He is now away from NSAIDs and creatinine has been relatively stable. Currently on low dose losartan for renoprotective effect. Will plan " routine follow-up.      2.  QURESHI: Encouraged ongoing weight loss not only to help his kidney function/proteinuria but also avoid addt liver damage related to fatty liver disease.  Also encouraged avoidance of etoh. Recommend follow-up with hepatology.     3. Elevated glucose level - previously educated on risk of pre-diabetes - encouraged healthy diet, exercise. Encouraged ongoing weight loss. Glucose today is above 200 - I am going to add on a hemoglobin A1C to assure not concerning for diabetes. If elevated, will encourage follow-up with primary care.     4. Secondary hyperparathyroidism, renal:  in Feb - will get vitamin D studies.     Patient Instructions   1. Recommend appt with hepatology at the [facility]  2. Labs in 6 months  3. Follow-up in one year.           JUAN LUIS FAUSTIN MD

## 2018-08-23 LAB
DEPRECATED CALCIDIOL+CALCIFEROL SERPL-MC: <41 UG/L (ref 20–75)
VITAMIN D2 SERPL-MCNC: <5 UG/L
VITAMIN D3 SERPL-MCNC: 36 UG/L

## 2018-08-28 DIAGNOSIS — R73.09 ELEVATED GLUCOSE: ICD-10-CM

## 2018-08-28 DIAGNOSIS — K75.81 NASH (NONALCOHOLIC STEATOHEPATITIS): Chronic | ICD-10-CM

## 2018-08-28 DIAGNOSIS — N18.30 CKD (CHRONIC KIDNEY DISEASE) STAGE 3, GFR 30-59 ML/MIN (H): Chronic | ICD-10-CM

## 2018-08-28 LAB
ALBUMIN SERPL-MCNC: 3.4 G/DL (ref 3.4–5)
ALBUMIN SERPL-MCNC: 3.4 G/DL (ref 3.4–5)
ALP SERPL-CCNC: 132 U/L (ref 40–150)
ALT SERPL W P-5'-P-CCNC: 126 U/L (ref 0–70)
ALT SERPL W P-5'-P-CCNC: 128 U/L (ref 0–70)
ANION GAP SERPL CALCULATED.3IONS-SCNC: 7 MMOL/L (ref 3–14)
ANION GAP SERPL CALCULATED.3IONS-SCNC: 7 MMOL/L (ref 3–14)
AST SERPL W P-5'-P-CCNC: 49 U/L (ref 0–45)
AST SERPL W P-5'-P-CCNC: 49 U/L (ref 0–45)
BILIRUB DIRECT SERPL-MCNC: 0.1 MG/DL (ref 0–0.2)
BILIRUB SERPL-MCNC: 0.5 MG/DL (ref 0.2–1.3)
BUN SERPL-MCNC: 30 MG/DL (ref 7–30)
BUN SERPL-MCNC: 31 MG/DL (ref 7–30)
CALCIUM SERPL-MCNC: 8.7 MG/DL (ref 8.5–10.1)
CALCIUM SERPL-MCNC: 8.8 MG/DL (ref 8.5–10.1)
CHLORIDE SERPL-SCNC: 108 MMOL/L (ref 94–109)
CHLORIDE SERPL-SCNC: 108 MMOL/L (ref 94–109)
CO2 SERPL-SCNC: 22 MMOL/L (ref 20–32)
CO2 SERPL-SCNC: 23 MMOL/L (ref 20–32)
CREAT SERPL-MCNC: 1.9 MG/DL (ref 0.66–1.25)
CREAT SERPL-MCNC: 1.92 MG/DL (ref 0.66–1.25)
CREAT UR-MCNC: 32 MG/DL
ERYTHROCYTE [DISTWIDTH] IN BLOOD BY AUTOMATED COUNT: 13.3 % (ref 10–15)
GFR SERPL CREATININE-BSD FRML MDRD: 40 ML/MIN/1.7M2
GFR SERPL CREATININE-BSD FRML MDRD: 40 ML/MIN/1.7M2
GLUCOSE SERPL-MCNC: 125 MG/DL (ref 70–99)
GLUCOSE SERPL-MCNC: 129 MG/DL (ref 70–99)
HBA1C MFR BLD: 6.4 % (ref 0–5.6)
HCT VFR BLD AUTO: 46.5 % (ref 40–53)
HGB BLD-MCNC: 15.9 G/DL (ref 13.3–17.7)
INR PPP: 0.95 (ref 0.86–1.14)
MCH RBC QN AUTO: 30.5 PG (ref 26.5–33)
MCHC RBC AUTO-ENTMCNC: 34.2 G/DL (ref 31.5–36.5)
MCV RBC AUTO: 89 FL (ref 78–100)
MICROALBUMIN UR-MCNC: 12 MG/L
MICROALBUMIN/CREAT UR: 37.38 MG/G CR (ref 0–17)
PHOSPHATE SERPL-MCNC: 2.2 MG/DL (ref 2.5–4.5)
PLATELET # BLD AUTO: 296 10E9/L (ref 150–450)
POTASSIUM SERPL-SCNC: 4.2 MMOL/L (ref 3.4–5.3)
POTASSIUM SERPL-SCNC: 4.3 MMOL/L (ref 3.4–5.3)
PROT SERPL-MCNC: 7.3 G/DL (ref 6.8–8.8)
PROT UR-MCNC: 0.07 G/L
PROT/CREAT 24H UR: 0.26 G/G CR (ref 0–0.2)
RBC # BLD AUTO: 5.22 10E12/L (ref 4.4–5.9)
SODIUM SERPL-SCNC: 137 MMOL/L (ref 133–144)
SODIUM SERPL-SCNC: 138 MMOL/L (ref 133–144)
WBC # BLD AUTO: 9.7 10E9/L (ref 4–11)

## 2018-08-28 PROCEDURE — 80048 BASIC METABOLIC PNL TOTAL CA: CPT | Performed by: INTERNAL MEDICINE

## 2018-08-28 PROCEDURE — 80076 HEPATIC FUNCTION PANEL: CPT | Performed by: INTERNAL MEDICINE

## 2018-08-28 PROCEDURE — 85027 COMPLETE CBC AUTOMATED: CPT | Performed by: INTERNAL MEDICINE

## 2018-08-28 PROCEDURE — 80069 RENAL FUNCTION PANEL: CPT | Performed by: INTERNAL MEDICINE

## 2018-08-28 PROCEDURE — 84460 ALANINE AMINO (ALT) (SGPT): CPT | Performed by: INTERNAL MEDICINE

## 2018-08-28 PROCEDURE — 84156 ASSAY OF PROTEIN URINE: CPT | Performed by: INTERNAL MEDICINE

## 2018-08-28 PROCEDURE — 83036 HEMOGLOBIN GLYCOSYLATED A1C: CPT | Performed by: INTERNAL MEDICINE

## 2018-08-28 PROCEDURE — 85610 PROTHROMBIN TIME: CPT | Performed by: INTERNAL MEDICINE

## 2018-08-28 PROCEDURE — 84450 TRANSFERASE (AST) (SGOT): CPT | Performed by: INTERNAL MEDICINE

## 2018-08-28 PROCEDURE — 36415 COLL VENOUS BLD VENIPUNCTURE: CPT | Performed by: INTERNAL MEDICINE

## 2018-08-28 PROCEDURE — 82043 UR ALBUMIN QUANTITATIVE: CPT | Performed by: INTERNAL MEDICINE

## 2018-08-29 ENCOUNTER — TELEPHONE (OUTPATIENT)
Dept: GASTROENTEROLOGY | Facility: CLINIC | Age: 33
End: 2018-08-29

## 2018-08-29 NOTE — TELEPHONE ENCOUNTER
Patient contacted and reminded of upcoming appointment.  Patient confirmed they will be attending.  Patient instructed to bring updated medications list to appointment.  Reviewed medication.  Arnol Brandon, CMA

## 2018-08-31 ENCOUNTER — OFFICE VISIT (OUTPATIENT)
Dept: GASTROENTEROLOGY | Facility: CLINIC | Age: 33
End: 2018-08-31
Attending: PHYSICIAN ASSISTANT
Payer: COMMERCIAL

## 2018-08-31 VITALS
OXYGEN SATURATION: 97 % | DIASTOLIC BLOOD PRESSURE: 85 MMHG | BODY MASS INDEX: 35 KG/M2 | WEIGHT: 250 LBS | HEIGHT: 71 IN | HEART RATE: 76 BPM | SYSTOLIC BLOOD PRESSURE: 134 MMHG

## 2018-08-31 DIAGNOSIS — K75.81 NASH (NONALCOHOLIC STEATOHEPATITIS): Primary | Chronic | ICD-10-CM

## 2018-08-31 PROCEDURE — G0463 HOSPITAL OUTPT CLINIC VISIT: HCPCS | Mod: ZF

## 2018-08-31 ASSESSMENT — PAIN SCALES - GENERAL: PAINLEVEL: NO PAIN (0)

## 2018-08-31 NOTE — LETTER
8/31/2018       RE: Abelino Gracia  [address] [id number]     Dear Colleague,    Thank you for referring your patient, Abelino Gracia, to the [facility] HEPATOLOGY at [facility]. Please see a copy of my visit note below.    Hepatology Follow-up Clinic note  Abelino Gracia   Date of Birth 1985  Date of Service 8/31/2018    Reason for follow-up: QURESHI         Assessment/plan:   Abelino Gracia is a 33 year old male with history of biopsy proven QURESHI and history of alcohol use. No current evidence of biochemical, radiological or physical markers of cirrhosis. We discussed the pathophysiology and natural history of nonalcoholic fatty liver disease. We discussed NAFLD which includes optimization of weight loss, as well as optimization of risk factors including hyperlipidemia and blood glucose. We spent a considerable amount of time discussing sources of carbohydrates and healthful nutrition.      - Maintain good control of cholesterol and blood glucose.  - Recommend slow and gradual weight loss  - Discussed restarting routine exercise and proper diet - emphasizing limit carbohydrates (60 gm for meals and <30 gm for meals), especially simple carbohydrates.   - Avoid alcohol  - Will plan for Fibrosis Scan in the future  - Follow up labs in 6 months      Jack Paniagua PA-C   [facility] Hepatology clinic    Total time involved with patient was 30 minutes with >50% of time involved with counseling and coordination of care as noted above.   -----------------------------------------------------       HPI:   Aeblino Gracia is a 33 year old male presenting for follow-up.     Dx: QURESHI  Bx:     1. Minimal simple steatosis in otherwise unremarkable hepatic parenchyma  2. Negative for significant fibrosis; negative for necroinflammatory activity.  3. Negative for chronic liver disease    Patient last saw Dr. Vizcarra on  10/13/2016. Patient states his Nephrologist recommended that he follow up in Hepatology clinic. He states his diabetes labs were elevated and that scared him ( HgbA1c recently was 6.4). He denies any hospitalizations or ER visits. He denies any significant changes in his medical history over the past few years. He states his CKD has been stable for the past 8 years. He does admit that he has had a significant amount of weight gain over the past year due to going back to poor eating habits, stress eating and decreased physical activity .    Two years ago he lost weight using Weight Watchers and got down to 199 lbs. He states he started gaining weight after he quit smoking as well as other stressful life events. He also states that he was essentially not doing any additional physical activity for the past year. This past week, he started working from home and he has goals to start increasing the steps in his day and plan for 45 minutes of activity every afternoon. He is seeing a psychiatrist on a weekly basis and does therapy with them.     Patient denies jaundice, lower extremity edema, abdominal distension or confusion.  Patient also denies melena, hematochezia or hematemesis. Patient denies weight loss, fevers, sweats or chills.    He quit smoking in March of 13 of 2017. Patient had a history of alcohol abuse, sober for 10 years. He previously went through treatment. He has relapsed during that time, but committed to not drinking again.  No history of IV/IN drug use. 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 He lives his with spouse.     Medical hx Surgical hx   Past Medical History:   Diagnosis Date     Allergies      Depressive disorder 2017     Leukocytosis      Panic disorder without agoraphobia      Uncomplicated asthma 1990    Past Surgical History:   Procedure Laterality Date     APPENDECTOMY  08/25/2004     C NONSPECIFIC PROCEDURE   Feb 2011     Laparoscopic  "cholecystectomy.     CHOLECYSTECTOMY       COLONOSCOPY N/A 11/14/2016    Procedure: COLONOSCOPY;  Surgeon: Mauro Tan MD;  Location:  GI     TONSILLECTOMY  06/08/2006                 Medications:     Current Outpatient Prescriptions   Medication     albuterol (2.5 MG/3ML) 0.083% neb solution     buPROPion (WELLBUTRIN XL) 300 MG 24 hr tablet     cetirizine (ZYRTEC) 10 MG tablet     cholecalciferol (VITAMIN D) 1000 UNIT tablet     Fexofenadine HCl (ALLEGRA PO)     fluticasone (FLONASE) 50 MCG/ACT nasal spray     losartan (COZAAR) 25 MG tablet     propranolol (INDERAL LA) 60 MG 24 hr capsule     acetaminophen (TYLENOL) 325 MG tablet     albuterol (ALBUTEROL) 108 (90 BASE) MCG/ACT Inhaler     No current facility-administered medications for this visit.             Allergies:     Allergies   Allergen Reactions     Clarithromycin Hives     Penicillins      Sulfa Drugs      Adhesive Tape Rash            Review of Systems:   10 points ROS was obtained and highlighted in the HPI, otherwise negative.          Physical Exam:   VS:  /85  Pulse 76  Ht 1.803 m (5' 11\")  Wt 113.4 kg (250 lb)  SpO2 97%  BMI 34.87 kg/m2      Gen- well, NAD, A+Ox3, normal color  Lym- no palpable LAD  CVS- RRR  RS- CTA  Abd- obese, nontender. Unable to appreciate any organomegaly   Extr- hands normal, no MIKY  Skin- no rash or jaundice  Neuro- no asterixis  Psych- normal mood           Data:   Reviewed in person and significant for:    Lab Results   Component Value Date     08/28/2018      Lab Results   Component Value Date    POTASSIUM 4.3 08/28/2018     Lab Results   Component Value Date    CHLORIDE 108 08/28/2018     Lab Results   Component Value Date    CO2 22 08/28/2018     Lab Results   Component Value Date    BUN 30 08/28/2018     Lab Results   Component Value Date    CR 1.92 08/28/2018       Lab Results   Component Value Date    WBC 9.7 08/28/2018     Lab Results   Component Value Date    HGB 15.9 08/28/2018     Lab " Results   Component Value Date    HCT 46.5 08/28/2018     Lab Results   Component Value Date    MCV 89 08/28/2018     Lab Results   Component Value Date     08/28/2018       Lab Results   Component Value Date    AST 49 08/28/2018     Lab Results   Component Value Date     08/28/2018     Lab Results   Component Value Date    BILICONJ 0.0 02/15/2011      Lab Results   Component Value Date    BILITOTAL 0.5 08/28/2018       Lab Results   Component Value Date    ALBUMIN 3.4 08/28/2018     Lab Results   Component Value Date    PROTTOTAL 7.3 08/28/2018      Lab Results   Component Value Date    ALKPHOS 132 08/28/2018       Lab Results   Component Value Date    INR 0.95 08/28/2018       Again, thank you for allowing me to participate in the care of your patient.      Sincerely,    Jack Paniagua PA-C

## 2018-08-31 NOTE — MR AVS SNAPSHOT
After Visit Summary   8/31/2018    Abelino Gracia    MRN: [id number]           Patient Information     Date Of Birth          1985        Visit Information        Provider Department      8/31/2018 7:30 AM Jack Paniagua PA-C [facility] Hepatology        Today's Diagnoses     QURESHI (nonalcoholic steatohepatitis)    -  1      Care Instructions    Carbs: - try to keep carbs under 60 grams per meal, Snacks <30   - Grains - go for whole grains. Quinoa, brown rice, bulgar   - Fruits: go fruits with skins and seeds  - Starchy Veggies - corn, potatoes, peas  - Other Sweets/Junk               Follow-ups after your visit        Follow-up notes from your care team     Return in about 6 months (around 2/28/2019).      Your next 10 appointments already scheduled     Sep 10, 2018  1:40 PM CDT   Octavio [facility]th [facility]    [address] [facility] [address]   [phone]            Feb 18, 2019  7:00 AM CST   LAB with CP LAB   [facility]    [address] [place] [address]   [phone]           Please do not eat 10-12 hours before your appointment if you are coming in fasting for labs on lipids, cholesterol, or glucose (sugar). This does not apply to pregnant women. Water, hot tea and black coffee (with nothing added) are okay. Do not drink other fluids, diet soda or chew gum.            Mar 01, 2019  7:30 AM CST   (Arrive by 7:15 AM)   Return General Liver with ZENAIDA [facility] Hepatology ([facility])    [address]  [address]  [facility] [address] [phone]            Aug 26, 2019  7:30 AM CDT   LAB with LAB FIRST FLOOR [facility] ([facility])    [address] [place] [address]  "[id number] [phone]           Please do not eat 10-12 hours before your appointment if you are coming in fasting for labs on lipids, cholesterol, or glucose (sugar). This does not apply to pregnant women. Water, hot tea and black coffee (with nothing added) are okay. Do not drink other fluids, diet soda or chew gum.            Aug 26, 2019  8:00 AM CDT   Return Visit with Amina Mena MD   [facility]    [address] [place] [address] [phone]              Who to contact     If you have questions or need follow up information about today's clinic visit or your schedule please contact [facility] HEPATOLOGY directly at [phone].  Normal or non-critical lab and imaging results will be communicated to you by Mixifyhart, letter or phone within 4 business days after the clinic has received the results. If you do not hear from us within 7 days, please contact the clinic through Mixifyhart or phone. If you have a critical or abnormal lab result, we will notify you by phone as soon as possible.  Submit refill requests through Caarbon or call your pharmacy and they will forward the refill request to us. Please allow 3 business days for your refill to be completed.          Additional Information About Your Visit        Caarbon Information     Caarbon gives you secure access to your electronic health record. If you see a primary care provider, you can also send messages to your care team and make appointments. If you have questions, please call your primary care clinic.  If you do not have a primary care provider, please call [phone] and they will assist you.        Care EveryWhere ID     This is your Care EveryWhere ID. This could be used by other organizations to access your [facility] medical records  [id number]        Your Vitals Were     Pulse Height Pulse Oximetry BMI (Body Mass Index)          76 1.803 m (5' 11\") 97% 34.87 kg/m2      "    Blood Pressure from Last 3 Encounters:   08/31/18 134/85   08/21/18 121/85   08/01/18 126/86    Weight from Last 3 Encounters:   08/31/18 113.4 kg (250 lb)   08/21/18 115.7 kg (255 lb)   08/01/18 115.7 kg (255 lb)              Today, you had the following     No orders found for display       Primary Care Provider Office Phone # Fax #    Roberth Tavares [phone]       [address] [facility] [address]        Equal Access to Services     [facility]: Hadii aad ku hadasho Soomaali, waaxda luqadaha, qaybta kaalmada adevandanayamarisol, flip daniel . So [facility] [phone].    ATENCIÓN: Si habla español, tiene a soto disposición servicios gratuitos de asistencia lingüística. [facility] [phone].    We comply with applicable federal civil rights laws and [place] laws. We do not discriminate on the basis of race, color, national origin, age, disability, sex, sexual orientation, or gender identity.            Thank you!     Thank you for choosing [facility] HEPATOLOGY  for your care. Our goal is always to provide you with excellent care. Hearing back from our patients is one way we can continue to improve our services. Please take a few minutes to complete the written survey that you may receive in the mail after your visit with us. Thank you!             Your Updated Medication List - Protect others around you: Learn how to safely use, store and throw away your medicines at www.disposemymeds.org.          This list is accurate as of 8/31/18  9:00 AM.  Always use your most recent med list.                   Brand Name Dispense Instructions for use Diagnosis    acetaminophen 325 MG tablet    TYLENOL     Take 325 mg by mouth as needed        * albuterol (2.5 MG/3ML) 0.083% neb solution      Inhale 2.5 mg into the lungs as needed        * albuterol 108 (90 Base) MCG/ACT inhaler    PROAIR HFA    2 Inhaler    Inhale 2 puffs into the lungs every 4 hours as needed    Mild  intermittent asthma without complication       ALLEGRA PO      Take 60 mg by mouth daily as needed        buPROPion 300 MG 24 hr tablet    WELLBUTRIN XL    90 tablet    Take 1 tablet (300 mg) by mouth daily    Moderate anxiety       cetirizine 10 MG tablet    zyrTEC     Take 10 mg by mouth daily as needed        cholecalciferol 1000 UNIT tablet    vitamin D3    100 tablet    Take 1 tablet (1,000 Units) by mouth daily Start after Ergocalciferol therapy complete.    Vitamin D deficiency       FLONASE 50 MCG/ACT spray   Generic drug:  fluticasone     1 Package    Spray 1-2 sprays into both nostrils daily as needed        losartan 25 MG tablet    COZAAR    45 tablet    Take 0.5 tablets (12.5 mg) by mouth daily    CKD (chronic kidney disease) stage 3, GFR 30-59 ml/min       propranolol 60 MG 24 hr capsule    INDERAL LA    90 capsule    Take 1 capsule (60 mg) by mouth daily    Migraine with aura and without status migrainosus, not intractable       * Notice:  This list has 2 medication(s) that are the same as other medications prescribed for you. Read the directions carefully, and ask your doctor or other care provider to review them with you.

## 2018-08-31 NOTE — NURSING NOTE
"Chief Complaint   Patient presents with     Consult     elevated liver enzymes      /85  Pulse 76  Ht 1.803 m (5' 11\")  Wt 113.4 kg (250 lb)  SpO2 97%  BMI 34.87 kg/m2  Karin Garcia, CMA    "

## 2018-08-31 NOTE — PATIENT INSTRUCTIONS
Carbs: - try to keep carbs under 60 grams per meal, Snacks <30   - Grains - go for whole grains. Quinoa, brown rice, bulgar   - Fruits: go fruits with skins and seeds  - Starchy Veggies - corn, potatoes, peas  - Other Sweets/Junk

## 2018-08-31 NOTE — PROGRESS NOTES
Hepatology Follow-up Clinic note  Abelino Gracia   Date of Birth 1985  Date of Service 8/31/2018    Reason for follow-up: QURESHI         Assessment/plan:   Abelino Gracia is a 33 year old male with history of biopsy proven QURESHI and history of alcohol use. No current evidence of biochemical, radiological or physical markers of cirrhosis. We discussed the pathophysiology and natural history of nonalcoholic fatty liver disease. We discussed NAFLD which includes optimization of weight loss, as well as optimization of risk factors including hyperlipidemia and blood glucose. We spent a considerable amount of time discussing sources of carbohydrates and healthful nutrition.      - Maintain good control of cholesterol and blood glucose.  - Recommend slow and gradual weight loss  - Discussed restarting routine exercise and proper diet - emphasizing limit carbohydrates (60 gm for meals and <30 gm for meals), especially simple carbohydrates.   - Avoid alcohol  - Will plan for Fibrosis Scan in the future  - Follow up labs in 6 months      Jack Paniagua PA-C   [facility] Hepatology clinic    Total time involved with patient was 30 minutes with >50% of time involved with counseling and coordination of care as noted above.   -----------------------------------------------------       HPI:   Abelino Gracia is a 33 year old male presenting for follow-up.     Dx: QURESHI  Bx:     1. Minimal simple steatosis in otherwise unremarkable hepatic parenchyma  2. Negative for significant fibrosis; negative for necroinflammatory activity.  3. Negative for chronic liver disease    Patient last saw Dr. Vizcarra on 10/13/2016. Patient states his Nephrologist recommended that he follow up in Hepatology clinic. He states his diabetes labs were elevated and that scared him ( HgbA1c recently was 6.4). He denies any hospitalizations or ER visits. He denies any significant changes in his medical history over the past  few years. He states his CKD has been stable for the past 8 years. He does admit that he has had a significant amount of weight gain over the past year due to going back to poor eating habits, stress eating and decreased physical activity .    Two years ago he lost weight using Weight Watchers and got down to 199 lbs. He states he started gaining weight after he quit smoking as well as other stressful life events. He also states that he was essentially not doing any additional physical activity for the past year. This past week, he started working from home and he has goals to start increasing the steps in his day and plan for 45 minutes of activity every afternoon. He is seeing a psychiatrist on a weekly basis and does therapy with them.     Patient denies jaundice, lower extremity edema, abdominal distension or confusion.  Patient also denies melena, hematochezia or hematemesis. Patient denies weight loss, fevers, sweats or chills.    He quit smoking in March of 13 of 2017. Patient had a history of alcohol abuse, sober for 10 years. He previously went through treatment. He has relapsed during that time, but committed to not drinking again.  No history of IV/IN drug use. 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 He lives his with spouse.     Medical hx Surgical hx   Past Medical History:   Diagnosis Date     Allergies      Depressive disorder 2017     Leukocytosis      Panic disorder without agoraphobia      Uncomplicated asthma 1990    Past Surgical History:   Procedure Laterality Date     APPENDECTOMY  08/25/2004     C NONSPECIFIC PROCEDURE   Feb 2011     Laparoscopic cholecystectomy.     CHOLECYSTECTOMY       COLONOSCOPY N/A 11/14/2016    Procedure: COLONOSCOPY;  Surgeon: Mauro Tan MD;  Location:  GI     TONSILLECTOMY  06/08/2006                 Medications:     Current Outpatient Prescriptions   Medication     albuterol (2.5 MG/3ML) 0.083% neb  "solution     buPROPion (WELLBUTRIN XL) 300 MG 24 hr tablet     cetirizine (ZYRTEC) 10 MG tablet     cholecalciferol (VITAMIN D) 1000 UNIT tablet     Fexofenadine HCl (ALLEGRA PO)     fluticasone (FLONASE) 50 MCG/ACT nasal spray     losartan (COZAAR) 25 MG tablet     propranolol (INDERAL LA) 60 MG 24 hr capsule     acetaminophen (TYLENOL) 325 MG tablet     albuterol (ALBUTEROL) 108 (90 BASE) MCG/ACT Inhaler     No current facility-administered medications for this visit.             Allergies:     Allergies   Allergen Reactions     Clarithromycin Hives     Penicillins      Sulfa Drugs      Adhesive Tape Rash            Review of Systems:   10 points ROS was obtained and highlighted in the HPI, otherwise negative.          Physical Exam:   VS:  /85  Pulse 76  Ht 1.803 m (5' 11\")  Wt 113.4 kg (250 lb)  SpO2 97%  BMI 34.87 kg/m2      Gen- well, NAD, A+Ox3, normal color  Lym- no palpable LAD  CVS- RRR  RS- CTA  Abd- obese, nontender. Unable to appreciate any organomegaly   Extr- hands normal, no MIKY  Skin- no rash or jaundice  Neuro- no asterixis  Psych- normal mood           Data:   Reviewed in person and significant for:    Lab Results   Component Value Date     08/28/2018      Lab Results   Component Value Date    POTASSIUM 4.3 08/28/2018     Lab Results   Component Value Date    CHLORIDE 108 08/28/2018     Lab Results   Component Value Date    CO2 22 08/28/2018     Lab Results   Component Value Date    BUN 30 08/28/2018     Lab Results   Component Value Date    CR 1.92 08/28/2018       Lab Results   Component Value Date    WBC 9.7 08/28/2018     Lab Results   Component Value Date    HGB 15.9 08/28/2018     Lab Results   Component Value Date    HCT 46.5 08/28/2018     Lab Results   Component Value Date    MCV 89 08/28/2018     Lab Results   Component Value Date     08/28/2018       Lab Results   Component Value Date    AST 49 08/28/2018     Lab Results   Component Value Date     " 08/28/2018     Lab Results   Component Value Date    BILICONJ 0.0 02/15/2011      Lab Results   Component Value Date    BILITOTAL 0.5 08/28/2018       Lab Results   Component Value Date    ALBUMIN 3.4 08/28/2018     Lab Results   Component Value Date    PROTTOTAL 7.3 08/28/2018      Lab Results   Component Value Date    ALKPHOS 132 08/28/2018       Lab Results   Component Value Date    INR 0.95 08/28/2018

## 2018-09-05 DIAGNOSIS — E55.9 VITAMIN D DEFICIENCY: ICD-10-CM

## 2018-09-10 ENCOUNTER — OFFICE VISIT (OUTPATIENT)
Dept: FAMILY MEDICINE | Facility: CLINIC | Age: 33
End: 2018-09-10
Payer: COMMERCIAL

## 2018-09-10 VITALS
OXYGEN SATURATION: 98 % | DIASTOLIC BLOOD PRESSURE: 85 MMHG | WEIGHT: 256 LBS | SYSTOLIC BLOOD PRESSURE: 125 MMHG | HEART RATE: 78 BPM | TEMPERATURE: 97.8 F | BODY MASS INDEX: 35.7 KG/M2

## 2018-09-10 DIAGNOSIS — R73.02 GLUCOSE INTOLERANCE (IMPAIRED GLUCOSE TOLERANCE): Primary | ICD-10-CM

## 2018-09-10 PROCEDURE — 99214 OFFICE O/P EST MOD 30 MIN: CPT | Performed by: INTERNAL MEDICINE

## 2018-09-10 ASSESSMENT — PAIN SCALES - GENERAL: PAINLEVEL: NO PAIN (0)

## 2018-09-10 NOTE — PROGRESS NOTES
SUBJECTIVE:                                                    Abelino Gracia is a 33 year old male who presents to clinic today for the following health issues:      Patient is here to discuss lab results from two weeks ago.   Patient working on diet and exercise and has motivation for lifestyle change.  Was able to do it   Several years ago.  Stopped smoking  Is willing to go on medication in the meantime      Problem list and histories reviewed & adjusted, as indicated.  Additional history: as documented    Patient Active Problem List   Diagnosis     Major depressive disorder     Insomnia     Migraine headache     Asthma, mild intermittent     DESHAUN (obstructive sleep apnea)- mild (AHI 5)     CARDIOVASCULAR SCREENING; LDL GOAL LESS THAN 160     CKD (chronic kidney disease) stage 3, GFR 30-59 ml/min     Leukocytosis     QURESHI (nonalcoholic steatohepatitis)     BMI 31.0-31.9,adult     Hypophosphatemia     Alcohol dependence in remission (H)     Generalized anxiety disorder     Morbid obesity (H)     Secondary renal hyperparathyroidism (H)     Past Surgical History:   Procedure Laterality Date     APPENDECTOMY  08/25/2004     C NONSPECIFIC PROCEDURE   Feb 2011     Laparoscopic cholecystectomy.     CHOLECYSTECTOMY       COLONOSCOPY N/A 11/14/2016    Procedure: COLONOSCOPY;  Surgeon: Mauro Tan MD;  Location:  GI     TONSILLECTOMY  06/08/2006       Social History   Substance Use Topics     Smoking status: Former Smoker     Packs/day: 1.00     Years: 20.00     Types: Cigarettes     Start date: 1/1/1998     Quit date: 3/5/2017     Smokeless tobacco: Never Used      Comment: Have tried chantix     Alcohol use No      Comment: SOBER SINCE JANUARY 2009     Family History   Problem Relation Age of Onset     Allergies Mother      Allergies Brother      HEART DISEASE Maternal Grandfather      Hypertension Maternal Grandfather      Other Cancer Maternal Grandfather      Asthma Other      Cancer - colorectal  Maternal Grandmother 80     Cancer Maternal Grandmother 40     uterine cancer     KIDNEY DISEASE Maternal Grandmother       of kidney failure - ~ age 80     Diabetes Maternal Grandmother      Colon Cancer Maternal Grandmother      Other Cancer Maternal Grandmother          Current Outpatient Prescriptions   Medication Sig Dispense Refill     buPROPion (WELLBUTRIN XL) 300 MG 24 hr tablet Take 1 tablet (300 mg) by mouth daily 90 tablet 3     cetirizine (ZYRTEC) 10 MG tablet Take 10 mg by mouth daily as needed        cholecalciferol (VITAMIN D3) 1000 UNIT tablet Take 1 tablet (1,000 Units) by mouth daily Start after Ergocalciferol therapy complete. 92 tablet 3     Fexofenadine HCl (ALLEGRA PO) Take 60 mg by mouth daily as needed        losartan (COZAAR) 25 MG tablet Take 0.5 tablets (12.5 mg) by mouth daily 45 tablet 3     propranolol (INDERAL LA) 60 MG 24 hr capsule Take 1 capsule (60 mg) by mouth daily 90 capsule 3     sitagliptin (JANUVIA) 25 MG tablet Take 1 tablet (25 mg) by mouth daily 90 tablet 3     acetaminophen (TYLENOL) 325 MG tablet Take 325 mg by mouth as needed        albuterol (2.5 MG/3ML) 0.083% neb solution Inhale 2.5 mg into the lungs as needed        albuterol (ALBUTEROL) 108 (90 BASE) MCG/ACT Inhaler Inhale 2 puffs into the lungs every 4 hours as needed (Patient not taking: Reported on 9/10/2018) 2 Inhaler 3     fluticasone (FLONASE) 50 MCG/ACT nasal spray Spray 1-2 sprays into both nostrils daily as needed  1 Package 11     Allergies   Allergen Reactions     Clarithromycin Hives     Penicillins      Sulfa Drugs      Adhesive Tape Rash     Recent Labs   Lab Test  18   0736  18   0735   18   0916   16   0810   08/24/15   0728  05/13/15   0935   03/13/15   0825   07/26/10   1643   A1C   --   6.4*   --    --    --    --    --    --   6.3*   --    --    --    --    LDL   --    --    --   91   --   103*   --    --    --    --   70   --    --    HDL   --    --    --   40   --    35*   --    --    --    --   32*   --    --    TRIG   --    --    --   140   --   122   --    --    --    --   131   --    --    ALT  128*  126*   --   107*   < >   --    < >   --    --    < >  93*   < >  116*   CR  1.92*  1.90*   < >  1.92*   < >   --    < >  1.46*  1.65*   < >  1.90*   < >  1.54*   GFRESTIMATED  40*  40*   < >  40*   < >   --    < >  56*  49*   < >  41*   < >  54*   GFRESTBLACK  48*  49*   < >  48*   < >   --    < >  67  59*   < >  50*   < >  66   POTASSIUM  4.3  4.2   < >  4.3   < >   --    < >  4.6   --    < >  3.9   < >  4.1   TSH   --    --    --    --    --    --    --   1.00   --    --    --    --   0.85    < > = values in this interval not displayed.      BP Readings from Last 3 Encounters:   09/10/18 125/85   08/31/18 134/85   08/21/18 121/85    Wt Readings from Last 3 Encounters:   09/10/18 256 lb (116.1 kg)   08/31/18 250 lb (113.4 kg)   08/21/18 255 lb (115.7 kg)                    ROS:  Constitutional, HEENT, cardiovascular, pulmonary, gi and gu systems are negative, except as otherwise noted.    OBJECTIVE:     /85 (BP Location: Right arm, Patient Position: Chair, Cuff Size: Adult Large)  Pulse 78  Temp 97.8  F (36.6  C) (Oral)  Wt 256 lb (116.1 kg)  SpO2 98%  BMI 35.7 kg/m2  Body mass index is 35.7 kg/(m^2).  GENERAL: healthy, alert and no distress  EYES: Eyes grossly normal to inspection, PERRL and conjunctivae and sclerae normal  HENT: ear canals and TM's normal, nose and mouth without ulcers or lesions  NECK: no adenopathy, no asymmetry, masses, or scars and thyroid normal to palpation  RESP: lungs clear to auscultation - no rales, rhonchi or wheezes  CV: regular rate and rhythm, normal S1 S2, no S3 or S4, no murmur, click or rub, no peripheral edema and peripheral pulses strong  ABDOMEN: soft, nontender, no hepatosplenomegaly, no masses and bowel sounds normal  MS: no gross musculoskeletal defects noted, no edema  SKIN: no suspicious lesions or rashes  NEURO: Normal  strength and tone, mentation intact and speech normal  BACK: no CVA tenderness, no paralumbar tenderness  PSYCH: mentation appears normal, affect normal/bright    Diagnostic Test Results:  Results for orders placed or performed in visit on 08/28/18   Albumin Random Urine Quantitative with Creat Ratio   Result Value Ref Range    Creatinine Urine 32 mg/dL    Albumin Urine mg/L 12 mg/L    Albumin Urine mg/g Cr 37.38 (H) 0 - 17 mg/g Cr   ALT   Result Value Ref Range     (H) 0 - 70 U/L   AST   Result Value Ref Range    AST 49 (H) 0 - 45 U/L   Renal panel   Result Value Ref Range    Sodium 138 133 - 144 mmol/L    Potassium 4.2 3.4 - 5.3 mmol/L    Chloride 108 94 - 109 mmol/L    Carbon Dioxide 23 20 - 32 mmol/L    Anion Gap 7 3 - 14 mmol/L    Glucose 129 (H) 70 - 99 mg/dL    Urea Nitrogen 31 (H) 7 - 30 mg/dL    Creatinine 1.90 (H) 0.66 - 1.25 mg/dL    GFR Estimate 40 (L) >60 mL/min/1.7m2    GFR Estimate If Black 49 (L) >60 mL/min/1.7m2    Calcium 8.7 8.5 - 10.1 mg/dL    Phosphorus 2.2 (L) 2.5 - 4.5 mg/dL    Albumin 3.4 3.4 - 5.0 g/dL   Protein  random urine with Creat Ratio   Result Value Ref Range    Protein Random Urine 0.07 g/L    Protein Total Urine g/gr Creatinine 0.26 (H) 0 - 0.2 g/g Cr   Hemoglobin A1c   Result Value Ref Range    Hemoglobin A1C 6.4 (H) 0 - 5.6 %   Hepatic panel   Result Value Ref Range    Bilirubin Direct 0.1 0.0 - 0.2 mg/dL    Bilirubin Total 0.5 0.2 - 1.3 mg/dL    Albumin 3.4 3.4 - 5.0 g/dL    Protein Total 7.3 6.8 - 8.8 g/dL    Alkaline Phosphatase 132 40 - 150 U/L     (H) 0 - 70 U/L    AST 49 (H) 0 - 45 U/L   CBC with platelets   Result Value Ref Range    WBC 9.7 4.0 - 11.0 10e9/L    RBC Count 5.22 4.4 - 5.9 10e12/L    Hemoglobin 15.9 13.3 - 17.7 g/dL    Hematocrit 46.5 40.0 - 53.0 %    MCV 89 78 - 100 fl    MCH 30.5 26.5 - 33.0 pg    MCHC 34.2 31.5 - 36.5 g/dL    RDW 13.3 10.0 - 15.0 %    Platelet Count 296 150 - 450 10e9/L   INR   Result Value Ref Range    INR 0.95 0.86 - 1.14    Basic metabolic panel   Result Value Ref Range    Sodium 137 133 - 144 mmol/L    Potassium 4.3 3.4 - 5.3 mmol/L    Chloride 108 94 - 109 mmol/L    Carbon Dioxide 22 20 - 32 mmol/L    Anion Gap 7 3 - 14 mmol/L    Glucose 125 (H) 70 - 99 mg/dL    Urea Nitrogen 30 7 - 30 mg/dL    Creatinine 1.92 (H) 0.66 - 1.25 mg/dL    GFR Estimate 40 (L) >60 mL/min/1.7m2    GFR Estimate If Black 48 (L) >60 mL/min/1.7m2    Calcium 8.8 8.5 - 10.1 mg/dL       ASSESSMENT/PLAN:       ICD-10-CM    1. Glucose intolerance (impaired glucose tolerance) R73.02 DIABETES EDUCATOR REFERRAL     sitagliptin (JANUVIA) 25 MG tablet       On threshold of diabetes.  Cannot go on metformin.  Due to renal issues.  Start low dose of januvia.      Also consider victoza.  Diabetes education  Follow up in 6 weeks.  Follow creatinine on januvia,'      Roberth Tavares MD  [facility]

## 2018-09-10 NOTE — MR AVS SNAPSHOT
After Visit Summary   9/10/2018    Abelino Gracia    MRN: [id number]           Patient Information     Date Of Birth          1985        Visit Information        Provider Department      9/10/2018 1:40 PM Roberth Tavares MD [facility]        Today's Diagnoses     Glucose intolerance (impaired glucose tolerance)    -  1       Follow-ups after your visit        Additional Services     DIABETES EDUCATOR REFERRAL       DIABETES SELF MANAGEMENT TRAINING (DSMT)      Your provider has referred you to Diabetes Education: [facility]: Diabetes Education - All [facility] [phone]   [email or web address]     If an urgent visit is needed or A1C is above 12, Care Team to call the Diabetes  Education Team at [phone] or send an In Basket message to the Diabetes Education Pool (P DIAB ED-PATIENT CARE).    A  will call you to make your appointment. If it has been more than 3 business days since your referral was placed, please call the above phone number to schedule.    Type of training and number of hours: New Diagnosis: Initial group DSMT - 10 hours.      Diabetes Type: Type 2 - Diet Control   Medicare covers: 10 hours of initial DSMT in 12 month period from the time of first visit, plus 2 hours of follow-up DSMT annually, and additional hours as requested for insulin training.         Diabetes Co-Morbidities: none               A1C Goal:  <7.0       A1C is: Lab Results       Component                Value               Date                       A1C                      6.4                 08/28/2018              MEDICAL NUTRITION THERAPY (MNT) for Diabetes    Medical Nutrition Therapy with a Registered Dietitian can be provided in coordination with Diabetes Self-Management Training to assist in achieving optimal diabetes management.    MNT Type and Hours: New diagnosis: Initial MNT - 3 hours                        Medicare will cover: 3 hours initial MNT in 12 month period after first visit, plus 2 hours of follow-up MNT annually        Diabetes Education Topics: Comprehensive Knowledge Assessment and Instruction    Special Educational Needs Requiring Individual DSMT: None      Please be aware that coverage of these services is subject to the terms and limitations of your health insurance plan.  Call member services at your health plan to determine Diabetes Self-Management Training (Codes  and ) and Medical Nutrition Therapy (Codes 80747 and [id number]) benefits and ask which blood glucose monitor brands are covered by your plan.  Please bring the following with you to your appointment:    (1)  List of current medications   (2)  List of Blood Glucose Monitor brands that are covered by your insurance plan  (3)  Blood Glucose Monitor and log book  (4)   Food records for the 3 days prior to your visit    The Certified Diabetes Educator may make diabetes medication adjustments per the CDE Protocol and Collaborative Practice Agreement.                  Your next 10 appointments already scheduled     Feb 18, 2019  7:00 AM CST   [facility]    [address] [id number] [phone]           Please do not eat 10-12 hours before your appointment if you are coming in fasting for labs on lipids, cholesterol, or glucose (sugar). This does not apply to pregnant women. Water, hot tea and black coffee (with nothing added) are okay. Do not drink other fluids, diet soda or chew gum.            Mar 01, 2019  7:30 AM CST   (Arrive by 7:15 AM)   Return General Liver with Jack Paniagua PA-C   [facility] ([facility])    [address]  [address]  [facility] [address]   [phone]            Aug 26, 2019  7:30 AM CDT   LAB with LAB FIRST FLOOR [facility]  [facility] ([facility])    [address] [address] [place] [address] [phone]           Please do not eat 10-12 hours before your appointment if you are coming in fasting for labs on lipids, cholesterol, or glucose (sugar). This does not apply to pregnant women. Water, hot tea and black coffee (with nothing added) are okay. Do not drink other fluids, diet soda or chew gum.            Aug 26, 2019  8:00 AM CDT   Return Visit with Amina Mena MD   [facility]    [address] [address] [place] [address] [phone]              Who to contact     If you have questions or need follow up information about today's clinic visit or your schedule please contact [facility] directly at [phone].  Normal or non-critical lab and imaging results will be communicated to you by MyChart, letter or phone within 4 business days after the clinic has received the results. If you do not hear from us within 7 days, please contact the clinic through Smart Vo[employer]email[employer] or phone. If you have a critical or abnormal lab result, we will notify you by phone as soon as possible.  Submit refill requests through [employer] or call your pharmacy and they will forward the refill request to us. Please allow 3 business days for your refill to be completed.          Additional Information About Your Visit        Smart Voicemailhart Information     [employer] gives you secure access to your electronic health record. If you see a primary care provider, you can also send messages to your care team and make appointments. If you have questions, please call your primary care clinic.  If you do not have a primary care provider, please call [phone] and they will assist you.        Care EveryWhere ID     This is your Care EveryWhere ID. This could be used by other organizations to access your [place] medical records  [id number]        Your Vitals Were     Pulse  Temperature Pulse Oximetry BMI (Body Mass Index)          78 97.8  F (36.6  C) (Oral) 98% 35.7 kg/m2         Blood Pressure from Last 3 Encounters:   09/10/18 125/85   08/31/18 134/85   08/21/18 121/85    Weight from Last 3 Encounters:   09/10/18 256 lb (116.1 kg)   08/31/18 250 lb (113.4 kg)   08/21/18 255 lb (115.7 kg)              We Performed the Following     DIABETES EDUCATOR REFERRAL          Today's Medication Changes          These changes are accurate as of 9/10/18  2:28 PM.  If you have any questions, ask your nurse or doctor.               Start taking these medicines.        Dose/Directions    sitagliptin 25 MG tablet   Commonly known as:  JANUVIA   Used for:  Glucose intolerance (impaired glucose tolerance)   Started by:  Roberth Tavares MD        Dose:  25 mg   Take 1 tablet (25 mg) by mouth daily   Quantity:  90 tablet   Refills:  3            Where to get your medicines      These medications were sent to [facility] [address] [place] [address] [address] AT [address]  [address] [facility] [address]     Phone:  [phone]     sitagliptin 25 MG tablet                Primary Care Provider Office Phone # Fax #    Roberth Tavares [phone] [address] [facility] [address]        Equal Access to Services     NICOLETTE RODRIGUES AH: Hadii beny ku hadasho Soomaali, waaxda luqadaha, qaybta kaalmada adeegyada, flip dubois. So [facility] [phone].    ATENCIÓN: Si habla español, tiene a soto disposición servicios gratuitos de asistencia lingüística. Sabiha serra [phone].    We comply with applicable federal civil rights laws and [place] laws. We do not discriminate on the basis of race, color, national origin, age, disability, sex, sexual orientation, or gender identity.            Thank you!     Thank you for choosing [facility]  for your care. Our goal is always to provide you with  excellent care. Hearing back from our patients is one way we can continue to improve our services. Please take a few minutes to complete the written survey that you may receive in the mail after your visit with us. Thank you!             Your Updated Medication List - Protect others around you: Learn how to safely use, store and throw away your medicines at www.disposemymeds.org.          This list is accurate as of 9/10/18  2:28 PM.  Always use your most recent med list.                   Brand Name Dispense Instructions for use Diagnosis    acetaminophen 325 MG tablet    TYLENOL     Take 325 mg by mouth as needed        * albuterol (2.5 MG/3ML) 0.083% neb solution      Inhale 2.5 mg into the lungs as needed        * albuterol 108 (90 Base) MCG/ACT inhaler    PROAIR HFA    2 Inhaler    Inhale 2 puffs into the lungs every 4 hours as needed    Mild intermittent asthma without complication       ALLEGRA PO      Take 60 mg by mouth daily as needed        buPROPion 300 MG 24 hr tablet    WELLBUTRIN XL    90 tablet    Take 1 tablet (300 mg) by mouth daily    Moderate anxiety       cetirizine 10 MG tablet    zyrTEC     Take 10 mg by mouth daily as needed        cholecalciferol 1000 UNIT tablet    vitamin D3    92 tablet    Take 1 tablet (1,000 Units) by mouth daily Start after Ergocalciferol therapy complete.    Vitamin D deficiency       FLONASE 50 MCG/ACT spray   Generic drug:  fluticasone     1 Package    Spray 1-2 sprays into both nostrils daily as needed        losartan 25 MG tablet    COZAAR    45 tablet    Take 0.5 tablets (12.5 mg) by mouth daily    CKD (chronic kidney disease) stage 3, GFR 30-59 ml/min       propranolol 60 MG 24 hr capsule    INDERAL LA    90 capsule    Take 1 capsule (60 mg) by mouth daily    Migraine with aura and without status migrainosus, not intractable       sitagliptin 25 MG tablet    JANUVIA    90 tablet    Take 1 tablet (25 mg) by mouth daily    Glucose intolerance (impaired glucose  tolerance)       * Notice:  This list has 2 medication(s) that are the same as other medications prescribed for you. Read the directions carefully, and ask your doctor or other care provider to review them with you.

## 2018-09-24 ENCOUNTER — ALLIED HEALTH/NURSE VISIT (OUTPATIENT)
Dept: EDUCATION SERVICES | Facility: CLINIC | Age: 33
End: 2018-09-24
Payer: COMMERCIAL

## 2018-09-24 VITALS — WEIGHT: 251 LBS | BODY MASS INDEX: 35.01 KG/M2

## 2018-09-24 DIAGNOSIS — R73.02 GLUCOSE INTOLERANCE (IMPAIRED GLUCOSE TOLERANCE): Primary | ICD-10-CM

## 2018-09-24 PROCEDURE — G0108 DIAB MANAGE TRN  PER INDIV: HCPCS

## 2018-09-24 NOTE — PROGRESS NOTES
"Diabetes Self-Management Education & Support    Diabetes Education Self Management & Training    SUBJECTIVE/OBJECTIVE:  Presents for: Initial Assessment for new diagnosis  Accompanied by: Self  Diabetes education in the past 24mo: No  Focus of Visit: Patient Unsure  Diabetes type: Type 2  Date of diagnosis: 09/10/18  How confident are you filling out medical forms by yourself:: Extremely  Diabetes management related comments/concerns: Patient mentions no concerns, he states that he doesn't formally have a diabetes diagnosis, but since he has kidney disease, they are treating it like diabetes  Transportation concerns: No  Other concerns:: None  Cultural Influences/Ethnic Background:  American    Diabetes Symptoms & Complications  Blurred vision: Yes  Fatigue: Yes  Neuropathy: No  Foot ulcerations: No  Polydipsia: Yes  Polyphagia: Yes  Polyuria: Yes  Visual change: No  Weakness: No  Weight loss: No  Slow healing wounds: No  Recent Infection(s): No  Weight trend: Increasing rapidly (Patient quit smoking and has gained about 50 pounds)       Patient Problem List and Family Medical History reviewed for relevant medical history, current medical status, and diabetes risk factors.    Vitals:  Wt 113.9 kg (251 lb)  BMI 35.01 kg/m2  Estimated body mass index is 35.01 kg/(m^2) as calculated from the following:    Height as of 8/31/18: 1.803 m (5' 11\").    Weight as of this encounter: 113.9 kg (251 lb).   Last 3 BP:   BP Readings from Last 3 Encounters:   09/10/18 125/85   08/31/18 134/85   08/21/18 121/85       History   Smoking Status     Former Smoker     Packs/day: 1.00     Years: 20.00     Types: Cigarettes     Start date: 1/1/1998     Quit date: 3/5/2017   Smokeless Tobacco     Never Used     Comment: Have tried chantix       Labs:  Lab Results   Component Value Date    A1C 6.4 08/28/2018     Lab Results   Component Value Date     08/28/2018     Lab Results   Component Value Date    LDL 91 02/08/2018     HDL " Cholesterol   Date Value Ref Range Status   02/08/2018 40 >39 mg/dL Final   ]  GFR Estimate   Date Value Ref Range Status   08/28/2018 40 (L) >60 mL/min/1.7m2 Final     Comment:     Non  GFR Calc     GFR Estimate If Black   Date Value Ref Range Status   08/28/2018 48 (L) >60 mL/min/1.7m2 Final     Comment:      GFR Calc     Lab Results   Component Value Date    CR 1.92 08/28/2018     No results found for: MICROALBUMIN    Healthy Eating  Cultural/Baptism diet restrictions?: No  Patient on a regular basis: Eats 3 meals a day  Meals include: Breakfast, Lunch, Dinner, Snacks  Breakfast: Usually skips breakfast - will eat 1-1 1/2 hours after waking  Lunch: Salad with feta cheese, salami, sunlower seeds and meat and a sandwich   Dinner: 3 bean turkey chili OR hot and sour soup OR roast with carrots and potatoes   Snacks: Carrots and dill dip OR sandwich with whole grain bread and salami OR granola bar  Beverages: Water, Coffee (Sparkling Ice Drink, 1-3 cups of coffee with almond milk and sugar free sweetener)  Has patient met with a dietitian in the past?: No    Being Active  Being Active Assessed Today: Yes  Exercise:: Yes  Days per week of moderate to strenuous exercise (like a brisk walk): 3  On average, minutes per day of exercise at this level: 30  How intense was your typical exercise? : Light (like stretching or slow walking)  Exercise Minutes per Week: 90    Monitoring  Monitoring Assessed Today: Yes  Did patient bring glucose meter to appointment? : No (Taught meter today)      Taking Medications  Diabetes Medication(s)     Dipeptidyl Peptidase-4 (DPP-4) Inhibitors Sig    sitagliptin (JANUVIA) 25 MG tablet Take 1 tablet (25 mg) by mouth daily        Taking Medication Assessed Today: Yes  Current Treatments: Oral Agent (monotherapy)  Problems taking diabetes medications regularly?: No  Diabetes medication side effects?: Yes (patient mentions a runny nose)  Treatment Compliance:  All of the time    Problem Solving  Problem Solving Assessed Today: No    Reducing Risks  Reducing Risks Assessed Today: No    Healthy Coping  Healthy Coping Assessed Today: No  Patient Activation Measure Survey Score:  СЕРГЕЙ Score (Last Two) 8/6/2010   СЕРГЕЙ Raw Score 45   Activation Score 73.1   СЕРГЕЙ Level 4       ASSESSMENT:  Patient has an A1C of 6.4, however states that his MD is treating it as diabetes, because the patient has kidney disease. Commended patient on quitting smoking.      Goals        General    Being Active (pt-stated)     Notes - Note created  9/24/2018  8:24 AM by Martita Barron RD    Goal Statement: I will be active for 30 minutes, 5 days/week    Measure of Success: patient will report    Date to Achieve By: October 22nd, 2018              Patient's most recent   Lab Results   Component Value Date    A1C 6.4 08/28/2018    is meeting goal of <7.0    INTERVENTION:   Diabetes knowledge and skills assessment:     Patient is knowledgeable in diabetes management concepts related to: none, new diagnosis    Patient needs further education on the following diabetes management concepts: Healthy Eating, Being Active, Monitoring, Taking Medication, Problem Solving, Reducing Risks and Healthy Coping    Based on learning assessment above, most appropriate setting for further diabetes education would be: Group class or Individual setting. Patient isn't able to come to group class, due to work schedule.     Education provided today on:  AADE Self-Care Behaviors:  Diabetes Pathophysiology  Healthy Eating: carbohydrate counting, consistency in amount, composition, and timing of food intake, weight reduction and portion control  Being Active: relationship to blood glucose  Monitoring: purpose, proper technique, log and interpret results, individual blood glucose targets, frequency of monitoring (patient to check once/day), use of glucose control solution and proper sharps disposal  Patient was instructed on  Contour Next One meter and was able to provide an accurate return demonstration. Patient's blood glucose reading today was 125 mg/dL fasting.    Opportunities for ongoing education and support in diabetes-self management were discussed.    Pt verbalized understanding of concepts discussed and recommendations provided today.       Education Materials Provided:  [facility] Understanding Diabetes Booklet, Safe Disposal Options for Needles & Syringes, BG Log Sheet and Carbohydrate Counting    PLAN:  See Patient Instructions for co-developed, patient-stated behavior change goals.  AVS printed and provided to patient today. See Follow-Up section for recommended follow-up.  To cover at  Next follow-up: preventing complications, heart healthy eating, carb counting follow-up, blood sugar follow-up    Martita Barron RD, LD  Time Spent: 60 minutes  Encounter Type: Individual    Any diabetes medication dose changes were made via the CDE Protocol and Collaborative Practice Agreement with the patient's referring provider. A copy of this encounter was shared with the provider.

## 2018-09-24 NOTE — MR AVS SNAPSHOT
After Visit Summary   9/24/2018    Abelino Gracia    MRN: [id number]           Patient Information     Date Of Birth          1985        Visit Information        Provider Department      9/24/2018 7:30 AM CP DIABETIC ED RESOURCE [place] [facility] [place]        Care Instructions    Diabetes Support Resources:  Exercise: Community Education classes - check local school district websites/mailings  Local Fitness Centers: Planet Fitness, [place] Fitness, Lifetime Fitness, Snap Fitness, etc.  [employer]: [place], [place], [place], [place], [place], [place], [place], [place], [place] ([facility]), [facility], [place], [place] ([place]), [place], [place], [facility] ([place]), [place], [place], [place]  Offerings in Clinic Communities: [facility] Education Yoga Classes, offered frequently throughout the year.   [phone],   https://[facility].Neotropix.Nascentric.Decision Lens/public/getcategory/program_id/2   Living Well with Chronic Conditions at the [facility]; www.Ravti.org;   [phone]  [place] [facility] ([place]); 2nd Wednesday of each month September through May  Located at the [facility] from 1:15-2:15 pm, no RSVP needed;   https://www.[place].org/specialties/diabetes-care-and-treatment/diabetes-education; 541.880.7459  [facility] Education Group Exercise Classes: Around $30-50 for the 1-2 month session (depending on the class) https://[place].ClickOn/search?sf%5Bcategory%5D%5B%5D=75  [facility] Education Cooking Classes: https://Panono/search?sf%5Bcategory%5D%5B%5D=65; 748.226.7937  Living Well with Chronic Conditions class offered at the [facility]; www.Ravti.org;   306.711.2169  [facility]; 2nd Wednesday of each month September through May  Located at the [facility] from 1:15-2:15 pm, no  RSVP needed;   https://www.[place].org/specialties/diabetes-care-and-treatment/diabetes-education; 158.553.2937  [facility] and [facility] in [place]; Free Group Exercise classes;  Https://www.[facility].org/;  [phone]     Bring blood glucose meter and logbook with you to all doctor and follow-up appointments.     [place] [facility] Nutrition Services for the [facility]:  For Your Diabetes Education and Nutrition Appointments Call:  [phone]   For Diabetes Education or Nutrition Related Questions:   Phone: [phone]  E-mail: [email or web address]  Fax: [phone]   If you need a medication refill please contact your pharmacy. Please allow 3 business days for your refills to be completed.    Instructions for emailing the Diabetes Educators    If you need to communicate a non-urgent message to a Diabetes Educator via email, please send to [email or web address].    Please follow the following email guidelines:    Subject line: Secure: your clinic name (example: Secure: [facility])  In the email please include: First name, middle initial, last name and date of birth.    We will be in touch with you within one (1) business day.             Follow-ups after your visit        Your next 10 appointments already scheduled     Oct 22, 2018  7:30 AM CDT   Diabetes Education with CP DIABETIC ED RESOURCE   [place] [facility] [place] ([facility])    [address] [place] [id number]   [phone]            Dec 10, 2018  8:20 AM CST   Octavio Parra with Roberth Tavares MD   [facility] ([facility])    [address] [place] [id number]   [phone]            Feb 18, 2019  7:00 AM CST   LAB with CP LAB   [facility] ([facility])    [address] [place] [address] [place]  [address]   [phone]           Please do not eat 10-12 hours before your appointment if you are coming in fasting for labs on lipids, cholesterol, or glucose (sugar). This does not apply to pregnant women. Water, hot tea and black coffee (with nothing added) are okay. Do not drink other fluids, diet soda or chew gum.            Mar 01, 2019  7:30 AM CST   (Arrive by 7:15 AM)   Return General Liver with Jack Paniagua PA-C   [facility] ([facility])    [address]  [address]  [facility] [address]   [phone]            Aug 26, 2019  7:30 AM CDT   LAB with LAB FIRST FLOOR [facility] ([facility])    [address] [place] [phone]           Please do not eat 10-12 hours before your appointment if you are coming in fasting for labs on lipids, cholesterol, or glucose (sugar). This does not apply to pregnant women. Water, hot tea and black coffee (with nothing added) are okay. Do not drink other fluids, diet soda or chew gum.            Aug 26, 2019  8:00 AM CDT   Return Visit with Amina Mena MD   [facility] ([facility])    [address] [place] [phone]              Who to contact     If you have questions or need follow up information about today's clinic visit or your schedule please contact [place] [facility] directly at [phone].  Normal or non-critical lab and imaging results will be communicated to you by MyChart, letter or phone within 4 business days after the clinic has received the results. If you do not hear from us within 7 days, please contact the clinic through MyChart or phone. If you have a critical or abnormal lab result, we will notify you by phone as soon as possible.  Submit refill requests through [employer] or call your pharmacy and they will forward the refill  request to us. Please allow 3 business days for your refill to be completed.          Additional Information About Your Visit        MyChart Information     [facility]im[facility] gives you secure access to your electronic health record. If you see a primary care provider, you can also send messages to your care team and make appointments. If you have questions, please call your primary care clinic.  If you do not have a primary care provider, please call [phone] and they will assist you.        Care EveryWhere ID     This is your Care EveryWhere ID. This could be used by other organizations to access your [place] medical records  [id number]        Your Vitals Were     BMI (Body Mass Index)                   35.01 kg/m2            Blood Pressure from Last 3 Encounters:   09/10/18 125/85   08/31/18 134/85   08/21/18 121/85    Weight from Last 3 Encounters:   09/24/18 113.9 kg (251 lb)   09/10/18 116.1 kg (256 lb)   08/31/18 113.4 kg (250 lb)              Today, you had the following     No orders found for display       Primary Care Provider Office Phone # Fax #    Roberth Tavares [phone]       [address] [facility] [address]        Goals        General    Being Active (pt-stated)     Notes - Note created  9/24/2018  8:24 AM by Martita Barron, RD    Goal Statement: I will be active for 30 minutes, 5 days/week    Measure of Success: patient will report    Date to Achieve By: October 22nd, 2018          Equal Access to Services     [facility]: Hadii beny georgeso Soserenity, waaxda luqadaha, qaybta kaalmada adeegyada, waxay zeyad brown adevandana dubois. So [facility] [phone].    ATENCIÓN: Si habla español, tiene a soto disposición servicios gratuitos de asistencia lingüística. Llame al [phone].    We comply with applicable federal civil rights laws and [place] laws. We do not discriminate on the basis of race, color, national origin, age, disability, sex, sexual  orientation, or gender identity.            Thank you!     Thank you for choosing [place] [facility]  for your care. Our goal is always to provide you with excellent care. Hearing back from our patients is one way we can continue to improve our services. Please take a few minutes to complete the written survey that you may receive in the mail after your visit with us. Thank you!             Your Updated Medication List - Protect others around you: Learn how to safely use, store and throw away your medicines at www.disposemymeds.org.          This list is accurate as of 9/24/18  8:25 AM.  Always use your most recent med list.                   Brand Name Dispense Instructions for use Diagnosis    acetaminophen 325 MG tablet    TYLENOL     Take 325 mg by mouth as needed        * albuterol (2.5 MG/3ML) 0.083% neb solution      Inhale 2.5 mg into the lungs as needed        * albuterol 108 (90 Base) MCG/ACT inhaler    PROAIR HFA    2 Inhaler    Inhale 2 puffs into the lungs every 4 hours as needed    Mild intermittent asthma without complication       ALLEGRA PO      Take 60 mg by mouth daily as needed        buPROPion 300 MG 24 hr tablet    WELLBUTRIN XL    90 tablet    Take 1 tablet (300 mg) by mouth daily    Moderate anxiety       cetirizine 10 MG tablet    zyrTEC     Take 10 mg by mouth daily as needed        cholecalciferol 1000 UNIT tablet    vitamin D3    92 tablet    Take 1 tablet (1,000 Units) by mouth daily Start after Ergocalciferol therapy complete.    Vitamin D deficiency       FLONASE 50 MCG/ACT spray   Generic drug:  fluticasone     1 Package    Spray 1-2 sprays into both nostrils daily as needed        losartan 25 MG tablet    COZAAR    45 tablet    Take 0.5 tablets (12.5 mg) by mouth daily    CKD (chronic kidney disease) stage 3, GFR 30-59 ml/min       propranolol 60 MG 24 hr capsule    INDERAL LA    90 capsule    Take 1 capsule (60 mg) by mouth daily    Migraine with aura and  without status migrainosus, not intractable       sitagliptin 25 MG tablet    JANUVIA    90 tablet    Take 1 tablet (25 mg) by mouth daily    Glucose intolerance (impaired glucose tolerance)       * Notice:  This list has 2 medication(s) that are the same as other medications prescribed for you. Read the directions carefully, and ask your doctor or other care provider to review them with you.

## 2018-09-24 NOTE — PATIENT INSTRUCTIONS
Diabetes Support Resources:  Exercise: Community Education classes - check local school district websites/mailings  Local Fitness Centers: [employer] Fitness, LA Fitness, Lifetime Fitness, Snap Fitness, etc.  [place]: [place], [place], [place], [place], [place], [place], [place], [place], [place] ([place]), [facility], [place], [place] ([place]), [place], [place], [facility] ([place]), [place], [place], [place]  Offerings in Clinic Communities: [place] Community Education Yoga Classes, offered frequently throughout the year.   [phone],   https://[facility].Piece & Co..2sms.Perfect Commerce/public/getcategory/program_id/2   Living Well with Chronic Conditions at the [facility]; www.Utrip.UI Robot;   753.827.6041  [place] [facility] ([place]); 2nd Wednesday of each month September through May  Located at the [facility] from 1:15-2:15 pm, no RSVP needed;   https://www.[facility].org/specialties/diabetes-care-and-treatment/diabetes-education; 505.287.5582  [facility] Education Group Exercise Classes: Around $30-50 for the 1-2 month session (depending on the class) https://Domain Surgical/search?sf%5Bcategory%5D%5B%5D=75  [facility] Education Cooking Classes: https://Domain Surgical/search?sf%5Bcategory%5D%5B%5D=65; 299.125.7579  Living Well with Chronic Conditions class offered at the [facility]; www.Utrip.org;   354.525.9085  [place] [facility]; 2nd Wednesday of each month September through May  Located at the [facility] from 1:15-2:15 pm, no RSVP needed;   https://www.[facility].org/specialties/diabetes-care-and-treatment/diabetes-education; 199.864.6272  [facility] in [place]; Free Group Exercise classes;  Https://www.[facility].org/;  [phone]     Bring blood glucose meter and logbook with you to all doctor and follow-up  appointments.     [place] [facility] Education and Nutrition Services for the [facility]:  For Your Diabetes Education and Nutrition Appointments Call:  [phone]   For Diabetes Education or Nutrition Related Questions:   Phone: [phone]  E-mail: [email or web address]  Fax: [phone]   If you need a medication refill please contact your pharmacy. Please allow 3 business days for your refills to be completed.    Instructions for emailing the Diabetes Educators    If you need to communicate a non-urgent message to a Diabetes Educator via email, please send to [email or web address].    Please follow the following email guidelines:    Subject line: Secure: your clinic name (example: Secure: [facility])  In the email please include: First name, middle initial, last name and date of birth.    We will be in touch with you within one (1) business day.

## 2018-10-22 ENCOUNTER — ALLIED HEALTH/NURSE VISIT (OUTPATIENT)
Dept: EDUCATION SERVICES | Facility: CLINIC | Age: 33
End: 2018-10-22
Payer: COMMERCIAL

## 2018-10-22 VITALS — BODY MASS INDEX: 34.59 KG/M2 | WEIGHT: 248 LBS

## 2018-10-22 DIAGNOSIS — R73.02 GLUCOSE INTOLERANCE (IMPAIRED GLUCOSE TOLERANCE): Primary | ICD-10-CM

## 2018-10-22 PROCEDURE — G0108 DIAB MANAGE TRN  PER INDIV: HCPCS

## 2018-10-22 NOTE — MR AVS SNAPSHOT
After Visit Summary   10/22/2018    Abelino Gracia    MRN: [id number]           Patient Information     Date Of Birth          1985        Visit Information        Provider Department      10/22/2018 7:30 AM CP DIABETIC ED RESOURCE [facility] [facility]        Care Instructions    Diabetes Support Resources:  Websites: American Association of Diabetes Educators Participant Resources: www.diabeteseducator.org/living-with-diabetes   American Diabetes Association: www.diabetes.org  Diabetes Together 2 Goal: http://ckotezjj3prpm.org  Offerings in Clinic Communities: [facility] Yoga Classes, offered frequently throughout the year.   [phone],   https://VigLink.Target Software.BioSignia.Morris Innovative/public/getcategory/program_id/2   Living Well with Chronic Conditions at the [facility]; www.[facility] HeightsPROGENESIS TECHNOLOGIES[facility].org;   [phone]  [place] [facility] ([place]); 2nd Wednesday of each month September through May  Located at the [facility] from 1:15-2:15 pm, no RSVP needed;   https://www.[place].org/specialties/diabetes-care-and-treatment/diabetes-education; [phone]     Bring blood glucose meter and logbook with you to all doctor and follow-up appointments.     [place] [facility] for the [facility]:  For Your Diabetes Education and Nutrition Appointments Call:  [phone]   For Diabetes Education or Nutrition Related Questions:   Phone: [phone]  E-mail: [email or web address]  Fax: [phone]   If you need a medication refill please contact your pharmacy. Please allow 3 business days for your refills to be completed.    Instructions for emailing the Diabetes Educators    If you need to communicate a non-urgent message to a Diabetes Educator via email, please send to [email or web address].    Please follow the following email guidelines:    Subject line: Secure: your clinic name  (example: Secure: [facility])  In the email please include: First name, middle initial, last name and date of birth.    We will be in touch with you within one (1) business day.                 Follow-ups after your visit        Your next 10 appointments already scheduled     Dec 10, 2018  8:20 AM CST   Octavio Parra with Roberth Tavares MD   [facility] ([facility])    [address] [address]   [phone]            Feb 18, 2019  7:00 AM CST   LAB with CP LAB   [facility] ([facility])    [address] [place] [address]   [phone]           Please do not eat 10-12 hours before your appointment if you are coming in fasting for labs on lipids, cholesterol, or glucose (sugar). This does not apply to pregnant women. Water, hot tea and black coffee (with nothing added) are okay. Do not drink other fluids, diet soda or chew gum.            Mar 01, 2019  7:30 AM CST   (Arrive by 7:15 AM)   Return General Liver with Jack Paniagua PA-C   [facility] ([facility])    [address] [place] [address]  [address] [place] [id number] [phone]            Apr 08, 2019  7:30 AM CDT   Diabetes Education with CP DIABETIC ED RESOURCE   [facility] ([facility])    [address] [place] [address]   [phone]            Aug 26, 2019  7:30 AM CDT   LAB with LAB FIRST FLOOR [facility] ([facility])    [address] [place] [id number] [phone]           Please do not eat 10-12 hours before your appointment if you are coming in fasting for labs on lipids, cholesterol, or glucose (sugar). This does not apply to pregnant women. Water, hot tea and black coffee (with nothing added) are okay. Do not  drink other fluids, diet soda or chew gum.            Aug 26, 2019  8:00 AM CDT   Return Visit with Amina Mena MD   [facility] ([facility])    [address] [place] [id number] [phone]              Who to contact     If you have questions or need follow up information about today's clinic visit or your schedule please contact [facility] [place] [facility] directly at [phone].  Normal or non-critical lab and imaging results will be communicated to you by Ticketlandhart, letter or phone within 4 business days after the clinic has received the results. If you do not hear from us within 7 days, please contact the clinic through JuiceBoxJunglet or phone. If you have a critical or abnormal lab result, we will notify you by phone as soon as possible.  Submit refill requests through Naartjie or call your pharmacy and they will forward the refill request to us. Please allow 3 business days for your refill to be completed.          Additional Information About Your Visit        TicketlandharAffinity Networks Information     Naartjie gives you secure access to your electronic health record. If you see a primary care provider, you can also send messages to your care team and make appointments. If you have questions, please call your primary care clinic.  If you do not have a primary care provider, please call [phone] and they will assist you.        Care EveryWhere ID     This is your Care EveryWhere ID. This could be used by other organizations to access your [place] medical records  [id number]        Your Vitals Were     BMI (Body Mass Index)                   34.59 kg/m2            Blood Pressure from Last 3 Encounters:   09/10/18 125/85   08/31/18 134/85   08/21/18 121/85    Weight from Last 3 Encounters:   10/22/18 112.5 kg (248 lb)   09/24/18 113.9 kg (251 lb)   09/10/18 116.1 kg (256 lb)              Today, you had the following     No orders found for display        Primary Care Provider Office Phone # Fax #    Roberth Tavares [phone]       [address] [facility] [address]        Goals        General    Being Active (pt-stated)     Notes - Note created  9/24/2018  8:24 AM by Martita aBrron RD    Goal Statement: I will be active for 30 minutes, 5 days/week    Measure of Success: patient will report    Date to Achieve By: October 22nd, 2018          Equal Access to Services     NICOLETTE RODRIGUES : Hadii aad ku hadasho Soomaali, waaxda luqadaha, qaybta kaalmada adeegyada, waxay idiin hayaan adeeg kharash la'aan . So [facility] [phone].    ATENCIÓN: Si habla español, tiene a soto disposición servicios gratuitos de asistencia lingüística. [facility] [phone].    We comply with applicable federal civil rights laws and [place] laws. We do not discriminate on the basis of race, color, national origin, age, disability, sex, sexual orientation, or gender identity.            Thank you!     Thank you for choosing [facility] [place] [facility]  for your care. Our goal is always to provide you with excellent care. Hearing back from our patients is one way we can continue to improve our services. Please take a few minutes to complete the written survey that you may receive in the mail after your visit with us. Thank you!             Your Updated Medication List - Protect others around you: Learn how to safely use, store and throw away your medicines at www.disposemymeds.org.          This list is accurate as of 10/22/18  8:03 AM.  Always use your most recent med list.                   Brand Name Dispense Instructions for use Diagnosis    acetaminophen 325 MG tablet    TYLENOL     Take 325 mg by mouth as needed        * albuterol (2.5 MG/3ML) 0.083% neb solution      Inhale 2.5 mg into the lungs as needed        * albuterol 108 (90 Base) MCG/ACT inhaler    PROAIR HFA    2 Inhaler    Inhale 2 puffs into the lungs every 4 hours as needed     Mild intermittent asthma without complication       ALLEGRA PO      Take 60 mg by mouth daily as needed        blood glucose monitoring lancets     100 each    Use to test blood sugar 1 times daily    Glucose intolerance (impaired glucose tolerance)       blood glucose monitoring meter device kit     1 kit    Use to test blood sugars one time daily. Strips Ex: 7/31/2019, Lot: [id number], SN: [id number]    Glucose intolerance (impaired glucose tolerance)       blood glucose monitoring test strip    SARA CONTOUR    50 strip    Use to test blood sugars one time daily    Glucose intolerance (impaired glucose tolerance)       buPROPion 300 MG 24 hr tablet    WELLBUTRIN XL    90 tablet    Take 1 tablet (300 mg) by mouth daily    Moderate anxiety       cetirizine 10 MG tablet    zyrTEC     Take 10 mg by mouth daily as needed        cholecalciferol 1000 UNIT tablet    vitamin D3    92 tablet    Take 1 tablet (1,000 Units) by mouth daily Start after Ergocalciferol therapy complete.    Vitamin D deficiency       FLONASE 50 MCG/ACT spray   Generic drug:  fluticasone     1 Package    Spray 1-2 sprays into both nostrils daily as needed        losartan 25 MG tablet    COZAAR    45 tablet    Take 0.5 tablets (12.5 mg) by mouth daily    CKD (chronic kidney disease) stage 3, GFR 30-59 ml/min (H)       propranolol 60 MG 24 hr capsule    INDERAL LA    90 capsule    Take 1 capsule (60 mg) by mouth daily    Migraine with aura and without status migrainosus, not intractable       sitagliptin 25 MG tablet    JANUVIA    90 tablet    Take 1 tablet (25 mg) by mouth daily    Glucose intolerance (impaired glucose tolerance)       * Notice:  This list has 2 medication(s) that are the same as other medications prescribed for you. Read the directions carefully, and ask your doctor or other care provider to review them with you.

## 2018-10-22 NOTE — PROGRESS NOTES
"Diabetes Self-Management Education & Support    Diabetes Education Self Management & Training    SUBJECTIVE/OBJECTIVE:  Presents for: Individual review  Accompanied by: Self  Diabetes education in the past 24mo: Yes  Diabetes type: Type 2  Disease course: Stable  Diabetes management related comments/concerns: patient states that he bought more test strips over the counter and has been testing more often to learn about the foods that he is eating  Cultural Influences/Ethnic Background:  American    Diabetes Symptoms & Complications  Blurred vision: Yes  Fatigue: Yes  Foot ulcerations: No  Polydipsia: Yes  Polyphagia: No  Polyuria: Yes  Visual change: No  Weakness: No  Weight loss: No  Slow healing wounds: No  Weight trend: Decreasing steadily  Autonomic neuropathy: No  CVA: No  Nephropathy: Yes  Peripheral neuropathy: No  Peripheral Vascular Disease: No  Retinopathy: No  Sexual dysfunction: No    Patient Problem List and Family Medical History reviewed for relevant medical history, current medical status, and diabetes risk factors.    Vitals:  Wt 112.5 kg (248 lb)  BMI 34.59 kg/m2  Estimated body mass index is 34.59 kg/(m^2) as calculated from the following:    Height as of 8/31/18: 1.803 m (5' 11\").    Weight as of this encounter: 112.5 kg (248 lb).   Last 3 BP:   BP Readings from Last 3 Encounters:   09/10/18 125/85   08/31/18 134/85   08/21/18 121/85       History   Smoking Status     Former Smoker     Packs/day: 1.00     Years: 20.00     Types: Cigarettes     Start date: 1/1/1998     Quit date: 3/5/2017   Smokeless Tobacco     Never Used     Comment: Have tried chantix       Labs:  Lab Results   Component Value Date    A1C 6.4 08/28/2018     Lab Results   Component Value Date     08/28/2018     Lab Results   Component Value Date    LDL 91 02/08/2018     HDL Cholesterol   Date Value Ref Range Status   02/08/2018 40 >39 mg/dL Final   ]  GFR Estimate   Date Value Ref Range Status   08/28/2018 40 (L) >60 " mL/min/1.7m2 Final     Comment:     Non  GFR Calc     GFR Estimate If Black   Date Value Ref Range Status   2018 48 (L) >60 mL/min/1.7m2 Final     Comment:      GFR Calc     Lab Results   Component Value Date    CR 1.92 2018     No results found for: MICROALBUMIN    Healthy Eating  Healthy Eating Assessed Today: Yes  Cultural/Quaker diet restrictions?: No  Meal planning: Carbohydrate counting  Meals include: Breakfast, Lunch, Dinner, Snacks  Breakfast: 2 cheese sticks, salami and KIND protein bar OR hopkins and eggs  Lunch: leftovers from dinner  Dinner: zoodles with chicken and veggies OR turkey with tomato sauce on cauliflower rice OR chicken with salad and vegetables OR chicken on low carb tortilla  Snacks: cheese and salami   Beverages: Water, Coffee, Other  Has patient met with a dietitian in the past?: Yes    Being Active  Being Active Assessed Today: Yes  Exercise:: Yes (Patient just got a treadmill on Saturday and has been walking on that)  Days per week of moderate to strenuous exercise (like a brisk walk): 2  On average, minutes per day of exercise at this level: 30  How intense was your typical exercise? : Moderate (like brisk walking)  Exercise Minutes per Week: 60  Barrier to exercise: None    Monitoring  Monitoring Assessed Today: Yes  Did patient bring glucose meter to appointment? : No (patient had blood sugars on his regine)  Blood Glucose Meter: ContourNext  Home Glucose (Sugar) Monitorin+ times per day  Blood glucose trend: Decreasing steadily  Low Glucose Range (mg/dL): 70-90  High Glucose Range (mg/dL): 140-180  Overall Range (mg/dL): 110-130    Date Breakfast  Lunch  Dinner  Bedtime    Before After Before After Before After    10/17  114  115  114    10/18 114   130      10/19 126   118  119    10/20 106  89 122      10/21    88  94    10/22 104             Taking Medications  Diabetes Medication(s)     Dipeptidyl Peptidase-4 (DPP-4) Inhibitors Sig     sitagliptin (JANUVIA) 25 MG tablet Take 1 tablet (25 mg) by mouth daily        Taking Medication Assessed Today: Yes  Current Treatments: Diet, Oral Agent (monotherapy)  Problems taking diabetes medications regularly?: No  Diabetes medication side effects?: No  Treatment Compliance: All of the time    Problem Solving  Problem Solving Assessed Today: Yes  Hypoglycemia Frequency: Never  Hypoglycemia Treatment: Other food  Patient carries a carbohydrate source: No  Medical alert: No  Severe weather/disaster plan for diabetes management?: No  DKA prevention plan?: No  Sick day plan for diabetes management?: (P) No    Reducing Risks  Reducing Risks Assessed Today: Yes  CAD Risks: Diabetes Mellitus, Male sex, Obesity  Has dilated eye exam at least once a year?: Yes  Sees dentist every 6 months?: Yes  Sees podiatrist (foot doctor)?: No    Healthy Coping  Healthy Coping Assessed Today: Yes  Emotional response to diabetes: Ready to learn  Informal Support system:: Family, Spouse  Difficulty affording diabetes management supplies?: No  Support resources: None  Patient Activation Measure Survey Score:  СЕРГЕЙ Score (Last Two) 8/6/2010   СЕРГЕЙ Raw Score 45   Activation Score 73.1   СЕРГЕЙ Level 4       ASSESSMENT:  Abelino is doing great with his diabetes management, he has been doing well with his diet changes.  Abelino's blood sugars have all been within goal.      Goals Addressed as of 10/22/2018 at 8:05 AM             Today       Patient Stated      Being Active (pt-stated)   50%    Added 9/24/18 by Martita Barron, EDITH             Goal Statement: I will be active for 30 minutes, 5 days/week    Measure of Success: patient will report    Date to Achieve By: October 22nd, 2018              Patient's most recent   Lab Results   Component Value Date    A1C 6.4 08/28/2018    is meeting goal of <7.0    INTERVENTION:   Discussion:   Today we discussed preventing complications with diabetes, discussed signs and symptoms of  complications. We discussed heart healthy eating, writer encouraged limited sodium intake with pre-existing kidney disease.  We discussed coping with diabetes, patient has good support and is currently taking medications for depression.     Diabetes knowledge and skills assessment:     Patient is knowledgeable in diabetes management concepts related to: Healthy Eating, Being Active, Monitoring, Taking Medication, Problem Solving, Reducing Risks and Healthy Coping    Patient needs further education on the following diabetes management concepts: none    Based on learning assessment above, most appropriate setting for further diabetes education would be: Group class or Individual setting.    Education provided today on:  AADE Self-Care Behaviors:  Diabetes Pathophysiology  Healthy Eating: weight reduction and heart healthy diet  Being Active: relationship to blood glucose, describe appropriate activity program and precautions to take  Reducing Risks: major complications of diabetes, prevention, early diagnostic measures and treatment of complications, foot care, appropriate dental care, annual eye exam, smoking cessation, aspirin therapy, A1C - goals, relating to blood glucose levels, how often to check, lipids levels and goals and blood pressure and goals  Healthy Coping: recognize feelings about diagnosis, benefits of making appropriate lifestyle changes, utilize support systems, methods for coping with stress and when to seek professional counseling    Opportunities for ongoing education and support in diabetes-self management were discussed.    Pt verbalized understanding of concepts discussed and recommendations provided today.       Education Materials Provided:  [place] Taking Charge of Your Diabetes Book and Heart Healthy Eating    PLAN:  Patient is doing great with diabetes management and we will follow-up in 6 months.   See Patient Instructions for co-developed, patient-stated behavior change goals.  AVS  printed and provided to patient today. See Follow-Up section for recommended follow-up.    Martita Barron RD, LD  Time Spent: 30 minutes  Encounter Type: Individual    Any diabetes medication dose changes were made via the CDE Protocol and Collaborative Practice Agreement with the patient's referring provider. A copy of this encounter was shared with the provider.

## 2018-10-22 NOTE — PATIENT INSTRUCTIONS
Diabetes Support Resources:  Websites: American Association of Diabetes Educators Participant Resources: www.diabeteseducator.org/living-with-diabetes   American Diabetes Association: www.diabetes.org  Diabetes Together 2 Goal: http://cszbaxda7fxcb.org  Offerings in Clinic Communities: [facility] Yoga Classes, offered frequently throughout the year.   [phone],   https://[facility].Carefx.Fotofeedback.Kelso Technologies/public/getcategory/program_id/2   Living Well with Chronic Conditions at the [facility]; www.SDNsquare.datapine;   [phone]  [place] [facility] ([place]); 2nd Wednesday of each month September through May  Located at the [facility] from 1:15-2:15 pm, no RSVP needed;   https://www.[place].org/specialties/diabetes-care-and-treatment/diabetes-education; [phone]     Bring blood glucose meter and logbook with you to all doctor and follow-up appointments.     [place] [facility] for the [facility]:  For Your Diabetes Education and Nutrition Appointments Call:  [phone]   For Diabetes Education or Nutrition Related Questions:   Phone: [phone]  E-mail: [email or web address]  Fax: [phone]   If you need a medication refill please contact your pharmacy. Please allow 3 business days for your refills to be completed.    Instructions for emailing the Diabetes Educators    If you need to communicate a non-urgent message to a Diabetes Educator via email, please send to [email or web address].    Please follow the following email guidelines:    Subject line: Secure: your clinic name (example: Secure: [facility])  In the email please include: First name, middle initial, last name and date of birth.    We will be in touch with you within one (1) business day.

## 2019-02-11 ENCOUNTER — DOCUMENTATION ONLY (OUTPATIENT)
Dept: NEPHROLOGY | Facility: CLINIC | Age: 34
End: 2019-02-11

## 2019-02-11 DIAGNOSIS — N18.30 CKD (CHRONIC KIDNEY DISEASE) STAGE 3, GFR 30-59 ML/MIN (H): Primary | Chronic | ICD-10-CM

## 2019-02-11 NOTE — PROGRESS NOTES
"Patient is scheduled for a Lab appointment on 2/18/2019 for \"Dr. Rajesh rodriguez\".  Please enter future orders, or call to advise patient to cancel appointment if labs are not needed.  Thank you.    "

## 2019-02-18 DIAGNOSIS — N18.30 CKD (CHRONIC KIDNEY DISEASE) STAGE 3, GFR 30-59 ML/MIN (H): Chronic | ICD-10-CM

## 2019-02-18 LAB
ALBUMIN SERPL-MCNC: 3.7 G/DL (ref 3.4–5)
ANION GAP SERPL CALCULATED.3IONS-SCNC: 6 MMOL/L (ref 3–14)
BUN SERPL-MCNC: 37 MG/DL (ref 7–30)
CALCIUM SERPL-MCNC: 9.2 MG/DL (ref 8.5–10.1)
CHLORIDE SERPL-SCNC: 106 MMOL/L (ref 94–109)
CO2 SERPL-SCNC: 27 MMOL/L (ref 20–32)
CREAT SERPL-MCNC: 2.3 MG/DL (ref 0.66–1.25)
CREAT UR-MCNC: 68 MG/DL
GFR SERPL CREATININE-BSD FRML MDRD: 35 ML/MIN/{1.73_M2}
GLUCOSE SERPL-MCNC: 119 MG/DL (ref 70–99)
HGB BLD-MCNC: 15.2 G/DL (ref 13.3–17.7)
PHOSPHATE SERPL-MCNC: 2.2 MG/DL (ref 2.5–4.5)
POTASSIUM SERPL-SCNC: 4.1 MMOL/L (ref 3.4–5.3)
PROT UR-MCNC: 0.19 G/L
PROT/CREAT 24H UR: 0.27 G/G CR (ref 0–0.2)
PTH-INTACT SERPL-MCNC: 37 PG/ML (ref 18–80)
SODIUM SERPL-SCNC: 139 MMOL/L (ref 133–144)

## 2019-02-18 PROCEDURE — 80069 RENAL FUNCTION PANEL: CPT | Performed by: INTERNAL MEDICINE

## 2019-02-18 PROCEDURE — 85018 HEMOGLOBIN: CPT | Performed by: INTERNAL MEDICINE

## 2019-02-18 PROCEDURE — 36415 COLL VENOUS BLD VENIPUNCTURE: CPT | Performed by: INTERNAL MEDICINE

## 2019-02-18 PROCEDURE — 84156 ASSAY OF PROTEIN URINE: CPT | Performed by: INTERNAL MEDICINE

## 2019-02-18 PROCEDURE — 83970 ASSAY OF PARATHORMONE: CPT | Performed by: INTERNAL MEDICINE

## 2019-02-22 DIAGNOSIS — N18.30 CKD (CHRONIC KIDNEY DISEASE) STAGE 3, GFR 30-59 ML/MIN (H): Primary | Chronic | ICD-10-CM

## 2019-02-25 ENCOUNTER — OFFICE VISIT (OUTPATIENT)
Dept: NEPHROLOGY | Facility: CLINIC | Age: 34
End: 2019-02-25
Payer: COMMERCIAL

## 2019-02-25 VITALS
DIASTOLIC BLOOD PRESSURE: 85 MMHG | WEIGHT: 250 LBS | BODY MASS INDEX: 34.87 KG/M2 | OXYGEN SATURATION: 97 % | HEART RATE: 74 BPM | SYSTOLIC BLOOD PRESSURE: 125 MMHG

## 2019-02-25 DIAGNOSIS — N18.30 CKD (CHRONIC KIDNEY DISEASE) STAGE 3, GFR 30-59 ML/MIN (H): Chronic | ICD-10-CM

## 2019-02-25 DIAGNOSIS — N25.81 SECONDARY RENAL HYPERPARATHYROIDISM (H): ICD-10-CM

## 2019-02-25 DIAGNOSIS — N18.30 CKD (CHRONIC KIDNEY DISEASE) STAGE 3, GFR 30-59 ML/MIN (H): Primary | ICD-10-CM

## 2019-02-25 DIAGNOSIS — K75.81 NASH (NONALCOHOLIC STEATOHEPATITIS): ICD-10-CM

## 2019-02-25 LAB
ALBUMIN SERPL-MCNC: 3.8 G/DL (ref 3.4–5)
ALBUMIN UR-MCNC: NEGATIVE MG/DL
ANION GAP SERPL CALCULATED.3IONS-SCNC: 7 MMOL/L (ref 3–14)
APPEARANCE UR: CLEAR
BILIRUB UR QL STRIP: NEGATIVE
BUN SERPL-MCNC: 34 MG/DL (ref 7–30)
CALCIUM SERPL-MCNC: 8.9 MG/DL (ref 8.5–10.1)
CHLORIDE SERPL-SCNC: 110 MMOL/L (ref 94–109)
CO2 SERPL-SCNC: 23 MMOL/L (ref 20–32)
COLOR UR AUTO: ABNORMAL
CREAT SERPL-MCNC: 2.09 MG/DL (ref 0.66–1.25)
CREAT UR-MCNC: 27 MG/DL
GFR SERPL CREATININE-BSD FRML MDRD: 40 ML/MIN/{1.73_M2}
GLUCOSE SERPL-MCNC: 103 MG/DL (ref 70–99)
GLUCOSE UR STRIP-MCNC: NEGATIVE MG/DL
HGB UR QL STRIP: NEGATIVE
KETONES UR STRIP-MCNC: NEGATIVE MG/DL
LEUKOCYTE ESTERASE UR QL STRIP: NEGATIVE
MICROALBUMIN UR-MCNC: 12 MG/L
MICROALBUMIN/CREAT UR: 44.85 MG/G CR (ref 0–17)
NITRATE UR QL: NEGATIVE
PH UR STRIP: 5.5 PH (ref 5–7)
PHOSPHATE SERPL-MCNC: 2.8 MG/DL (ref 2.5–4.5)
POTASSIUM SERPL-SCNC: 4.3 MMOL/L (ref 3.4–5.3)
SODIUM SERPL-SCNC: 140 MMOL/L (ref 133–144)
SOURCE: ABNORMAL
SP GR UR STRIP: 1 (ref 1–1.03)
UROBILINOGEN UR STRIP-MCNC: NORMAL MG/DL (ref 0–2)

## 2019-02-25 PROCEDURE — 36415 COLL VENOUS BLD VENIPUNCTURE: CPT | Performed by: INTERNAL MEDICINE

## 2019-02-25 PROCEDURE — 81003 URINALYSIS AUTO W/O SCOPE: CPT | Performed by: INTERNAL MEDICINE

## 2019-02-25 PROCEDURE — 80069 RENAL FUNCTION PANEL: CPT | Performed by: INTERNAL MEDICINE

## 2019-02-25 PROCEDURE — 82043 UR ALBUMIN QUANTITATIVE: CPT | Performed by: INTERNAL MEDICINE

## 2019-02-25 PROCEDURE — 99214 OFFICE O/P EST MOD 30 MIN: CPT | Performed by: INTERNAL MEDICINE

## 2019-02-25 ASSESSMENT — PAIN SCALES - GENERAL: PAINLEVEL: NO PAIN (0)

## 2019-02-25 NOTE — NURSING NOTE
Abelino Gracia's goals for this visit include:   Chief Complaint   Patient presents with     RECHECK     CKD  LOV 8/21/18       He requests these members of his care team be copied on today's visit information: no    PCP: Roberth Tavares    Referring Provider:  No referring provider defined for this encounter.    /85 (BP Location: Right arm, Patient Position: Sitting, Cuff Size: Adult Large)   Pulse 74   Wt 113.4 kg (250 lb)   SpO2 97%   BMI 34.87 kg/m      Do you need any medication refills at today's visit? No    Irene Ramirez LPN

## 2019-02-26 ENCOUNTER — MYC REFILL (OUTPATIENT)
Dept: FAMILY MEDICINE | Facility: CLINIC | Age: 34
End: 2019-02-26

## 2019-02-26 DIAGNOSIS — G43.109 MIGRAINE WITH AURA AND WITHOUT STATUS MIGRAINOSUS, NOT INTRACTABLE: ICD-10-CM

## 2019-02-26 RX ORDER — PROPRANOLOL HCL 60 MG
CAPSULE, EXTENDED RELEASE 24HR ORAL
Qty: 90 CAPSULE | Refills: 0 | Status: CANCELLED | OUTPATIENT
Start: 2019-02-26

## 2019-02-26 RX ORDER — PROPRANOLOL HCL 60 MG
60 CAPSULE, EXTENDED RELEASE 24HR ORAL DAILY
Qty: 90 CAPSULE | Refills: 3 | Status: CANCELLED | OUTPATIENT
Start: 2019-02-26

## 2019-02-26 NOTE — TELEPHONE ENCOUNTER
Duplicate, just routed propranolol refill request to provider to address.    Sumi Leal RN  [facility]

## 2019-02-26 NOTE — TELEPHONE ENCOUNTER
"Requested Prescriptions   Pending Prescriptions Disp Refills     propranolol ER (INDERAL LA) 60 MG 24 hr capsule [Pharmacy Med Name: PROPRANOLOL ER 60MG CAPSULES] 90 capsule 0        Last Written Prescription Date:  2/8/18  Last Fill Quantity: 90,  # refills: 3   Last office visit: 9/10/2018 with prescribing provider:  9/10/18   Future Office Visit:     Sig: TAKE 1 CAPSULE(60 MG) BY MOUTH DAILY    Beta-Blockers Protocol Passed - 2/26/2019  2:22 PM       Passed - Blood pressure under 140/90 in past 12 months    BP Readings from Last 3 Encounters:   02/25/19 125/85   09/10/18 125/85   08/31/18 134/85                Passed - Patient is age 6 or older       Passed - Recent (12 mo) or future (30 days) visit within the authorizing provider's specialty    Patient had office visit in the last 12 months or has a visit in the next 30 days with authorizing provider or within the authorizing provider's specialty.  See \"Patient Info\" tab in inbasket, or \"Choose Columns\" in Meds & Orders section of the refill encounter.             Passed - Medication is active on med list        Routing refill request to provider for review/approval because:  Drug interaction warning          Sumi Leal RN  [facility]      "

## 2019-02-26 NOTE — TELEPHONE ENCOUNTER
"Requested Prescriptions   Pending Prescriptions Disp Refills     propranolol ER (INDERAL LA) 60 MG 24 hr capsule 90 capsule 3    Last Written Prescription Date:  2/8/18  Last Fill Quantity: 90,  # refills: 3   Last office visit: 9/10/2018 with prescribing provider:     Future Office Visit:     Sig: Take 1 capsule (60 mg) by mouth daily    Beta-Blockers Protocol Passed - 2/26/2019  9:27 AM       Passed - Blood pressure under 140/90 in past 12 months    BP Readings from Last 3 Encounters:   02/25/19 125/85   09/10/18 125/85   08/31/18 134/85                Passed - Patient is age 6 or older       Passed - Recent (12 mo) or future (30 days) visit within the authorizing provider's specialty    Patient had office visit in the last 12 months or has a visit in the next 30 days with authorizing provider or within the authorizing provider's specialty.  See \"Patient Info\" tab in inbasket, or \"Choose Columns\" in Meds & Orders section of the refill encounter.             Passed - Medication is active on med list          "

## 2019-02-27 DIAGNOSIS — N18.30 CKD (CHRONIC KIDNEY DISEASE) STAGE 3, GFR 30-59 ML/MIN (H): ICD-10-CM

## 2019-02-27 RX ORDER — PROPRANOLOL HCL 60 MG
CAPSULE, EXTENDED RELEASE 24HR ORAL
Qty: 90 CAPSULE | Refills: 1 | Status: SHIPPED | OUTPATIENT
Start: 2019-02-27 | End: 2019-07-30

## 2019-02-27 RX ORDER — LOSARTAN POTASSIUM 25 MG/1
12.5 TABLET ORAL DAILY
Qty: 45 TABLET | Refills: 3 | Status: SHIPPED | OUTPATIENT
Start: 2019-02-27 | End: 2019-05-07

## 2019-02-27 NOTE — TELEPHONE ENCOUNTER
Writer received a refill request from: [facility] in [place], [place].  [phone]    Medication: losartan (COZAAR) 25 MG tablet     Sig: Take 1/2 tablet by mouth every day    Date last dispensed: 5/11/18      Pt's last office visit: 2/25/19  Next scheduled office visit: 8/26/19      Per the RN/LPN medication refill protocol, writer is  to refill this request. If able to refill, please call the pt to inform them the RX was sent to the pharmacy. If unable to refill, route this encounter to the prescribing physician for authorization or further instructions.

## 2019-03-06 ENCOUNTER — DOCUMENTATION ONLY (OUTPATIENT)
Dept: FAMILY MEDICINE | Facility: CLINIC | Age: 34
End: 2019-03-06

## 2019-03-06 DIAGNOSIS — N18.30 CKD (CHRONIC KIDNEY DISEASE) STAGE 3, GFR 30-59 ML/MIN (H): Primary | Chronic | ICD-10-CM

## 2019-03-18 NOTE — PROGRESS NOTES
February 25, 2019  CHIEF COMPLAINT: CKD     HISTORY OF PRESENT ILLNESS:  Abelino Gracia is a 34 year-old male who has had elevated creatinine levels dating back to 2006 according to [facility] records.  His creatinine has ranged anywhere from 1.3-1.7 in his [facility] records.  He was seen by nephrology on one occasion while inpatient at [facility] in 2009 and his kidney injury at that time was felt to be related to long-standing heavy use of NSAIDs.  To briefly summarize risk factors for CKD: heavy NSAID use in the past, previous etoh abuse (sober since 09). Addt hx includes DESHAUN. His serologic workup including C3, C4, ANCA, Anti-GBM, and immunofixations were normal. He is felt to have QURESHI as the cause of his liver abnormalities.    We had him back sooner than planned today because of a recent increase in creatinine - creatinine has been 1.6-2 since 2013; 2.3 on 2/18/19. No hematuria previously; last UPCR 0.27 g/g on 2/18. He denies being sick recently. He was following a keto diet for a period of time. Has been taking a protein shake recently; feels he is plenty hydrated.        Past Medical History:   Diagnosis Date     Allergies     cats,dust, pollens     Depressive disorder 2017     Leukocytosis      Panic disorder without agoraphobia      Uncomplicated asthma 1990    Diagnosed as a child      Past Surgical History:   Procedure Laterality Date     APPENDECTOMY  08/25/2004     C NONSPECIFIC PROCEDURE   Feb 2011     Laparoscopic cholecystectomy.     CHOLECYSTECTOMY       COLONOSCOPY N/A 11/14/2016    Procedure: COLONOSCOPY;  Surgeon: Mauro Tan MD;  Location:  GI     TONSILLECTOMY  06/08/2006          SOCIAL HISTORY:  +tobacco  He started smoking between ages [age]. Previous heavy alcohol abuse for which he went through treatment, he has been sober since 2009.  No drug use.  He currently works for a health insurance company.  He does not have children.      REVIEW OF SYSTEMS: 4 point ROS obtained, all  pertinent positives and negatives in the HPI otherwise all other systems negative.      PHYSICAL EXAMINATION:   Blood pressure 125/85, pulse 74, weight 113.4 kg (250 lb), SpO2 97 %.   GENERAL:  The patient is alert and oriented x3, in no acute distress, appears comfortable, pleasant to speak clearly.   HEART:  Regular rate and rhythm, no murmurs, rubs or gallops.   LUNGS:  Clear to auscultation and no rhonchi, or rales.   EXTREMITIES:  There is no lower extremity pitting edema.   SKIN:  There are no rashes appreciated.   PSYCHIATRIC:  Mood and affect are within normal limits.     Orders Only on 02/25/2019   Component Date Value Ref Range Status     Creatinine Urine 02/25/2019 27  mg/dL Final     Albumin Urine mg/L 02/25/2019 12  mg/L Final     Albumin Urine mg/g Cr 02/25/2019 44.85* 0 - 17 mg/g Cr Final     Sodium 02/25/2019 140  133 - 144 mmol/L Final     Potassium 02/25/2019 4.3  3.4 - 5.3 mmol/L Final     Chloride 02/25/2019 110* 94 - 109 mmol/L Final     Carbon Dioxide 02/25/2019 23  20 - 32 mmol/L Final     Anion Gap 02/25/2019 7  3 - 14 mmol/L Final     Glucose 02/25/2019 103* 70 - 99 mg/dL Final    Non Fasting     Urea Nitrogen 02/25/2019 34* 7 - 30 mg/dL Final     Creatinine 02/25/2019 2.09* 0.66 - 1.25 mg/dL Final     GFR Estimate 02/25/2019 40* >60 mL/min/[1.73_m2] Final    Comment: Non  GFR Calc  Starting 12/18/2018, serum creatinine based estimated GFR (eGFR) will be   calculated using the Chronic Kidney Disease Epidemiology Collaboration   (CKD-EPI) equation.       GFR Estimate If Black 02/25/2019 46* >60 mL/min/[1.73_m2] Final    Comment:  GFR Calc  Starting 12/18/2018, serum creatinine based estimated GFR (eGFR) will be   calculated using the Chronic Kidney Disease Epidemiology Collaboration   (CKD-EPI) equation.       Calcium 02/25/2019 8.9  8.5 - 10.1 mg/dL Final     Phosphorus 02/25/2019 2.8  2.5 - 4.5 mg/dL Final     Albumin 02/25/2019 3.8  3.4 - 5.0 g/dL Final      Color Urine 02/25/2019 Straw   Final     Appearance Urine 02/25/2019 Clear   Final     Glucose Urine 02/25/2019 Negative  NEG^Negative mg/dL Final     Bilirubin Urine 02/25/2019 Negative  NEG^Negative Final     Ketones Urine 02/25/2019 Negative  NEG^Negative mg/dL Final     Specific Gravity Urine 02/25/2019 1.002* 1.003 - 1.035 Final     Blood Urine 02/25/2019 Negative  NEG^Negative Final     pH Urine 02/25/2019 5.5  5.0 - 7.0 pH Final     Protein Albumin Urine 02/25/2019 Negative  NEG^Negative mg/dL Final     Urobilinogen mg/dL 02/25/2019 Normal  0.0 - 2.0 mg/dL Final     Nitrite Urine 02/25/2019 Negative  NEG^Negative Final     Leukocyte Esterase Urine 02/25/2019 Negative  NEG^Negative Final     Source 02/25/2019 Midstream Urine   Final        ASSESSMENT AND PLAN:   1.  CKD Stage 3:  His biggest risk factor for kidney disease is related to long-standing NSAID use.  He has had a very low level of proteinuria with minimal albuminuria - suggests potentially a tubular injury. NSAIDs would fit with this finding. He is now away from NSAIDs. Creatinine was higher recently which may be somehow related to his change in diet potentially. Offered kidney biopsy but he would like to hold off on this for now. I explained that I would have a low threshold should we see a rise in creatinine or increase in proteinuria.     2.  QURESHI: Encouraged ongoing weight loss not only to help his kidney function/proteinuria but also avoid addt liver damage related to fatty liver disease.  Also encouraged avoidance of etoh. He has been seen by hepatology.     3. Elevated glucose level - previously educated on risk of pre-diabetes - encouraged healthy diet, exercise. Encouraged ongoing weight loss.      Patient Instructions   1. Labs monthly  2. Follow-up in 6 months          JUAN LUIS FAUSTIN MD

## 2019-03-27 ENCOUNTER — DOCUMENTATION ONLY (OUTPATIENT)
Dept: NEPHROLOGY | Facility: CLINIC | Age: 34
End: 2019-03-27

## 2019-03-27 DIAGNOSIS — N18.30 CKD (CHRONIC KIDNEY DISEASE) STAGE 3, GFR 30-59 ML/MIN (H): Chronic | ICD-10-CM

## 2019-03-27 LAB
ALBUMIN SERPL-MCNC: 3.8 G/DL (ref 3.4–5)
ANION GAP SERPL CALCULATED.3IONS-SCNC: 11 MMOL/L (ref 3–14)
BUN SERPL-MCNC: 33 MG/DL (ref 7–30)
CALCIUM SERPL-MCNC: 9.2 MG/DL (ref 8.5–10.1)
CHLORIDE SERPL-SCNC: 108 MMOL/L (ref 94–109)
CO2 SERPL-SCNC: 21 MMOL/L (ref 20–32)
CREAT SERPL-MCNC: 1.94 MG/DL (ref 0.66–1.25)
GFR SERPL CREATININE-BSD FRML MDRD: 43 ML/MIN/{1.73_M2}
GLUCOSE SERPL-MCNC: 123 MG/DL (ref 70–99)
PHOSPHATE SERPL-MCNC: 2.9 MG/DL (ref 2.5–4.5)
POTASSIUM SERPL-SCNC: 4.1 MMOL/L (ref 3.4–5.3)
SODIUM SERPL-SCNC: 140 MMOL/L (ref 133–144)

## 2019-03-27 PROCEDURE — 80069 RENAL FUNCTION PANEL: CPT | Performed by: INTERNAL MEDICINE

## 2019-03-27 PROCEDURE — 36415 COLL VENOUS BLD VENIPUNCTURE: CPT | Performed by: INTERNAL MEDICINE

## 2019-04-23 DIAGNOSIS — N18.30 CKD (CHRONIC KIDNEY DISEASE) STAGE 3, GFR 30-59 ML/MIN (H): Chronic | ICD-10-CM

## 2019-04-23 LAB
ALBUMIN SERPL-MCNC: 3.6 G/DL (ref 3.4–5)
ANION GAP SERPL CALCULATED.3IONS-SCNC: 8 MMOL/L (ref 3–14)
BUN SERPL-MCNC: 39 MG/DL (ref 7–30)
CALCIUM SERPL-MCNC: 9.1 MG/DL (ref 8.5–10.1)
CHLORIDE SERPL-SCNC: 109 MMOL/L (ref 94–109)
CO2 SERPL-SCNC: 22 MMOL/L (ref 20–32)
CREAT SERPL-MCNC: 1.8 MG/DL (ref 0.66–1.25)
GFR SERPL CREATININE-BSD FRML MDRD: 47 ML/MIN/{1.73_M2}
GLUCOSE SERPL-MCNC: 129 MG/DL (ref 70–99)
PHOSPHATE SERPL-MCNC: 2.9 MG/DL (ref 2.5–4.5)
POTASSIUM SERPL-SCNC: 4.2 MMOL/L (ref 3.4–5.3)
SODIUM SERPL-SCNC: 139 MMOL/L (ref 133–144)

## 2019-04-23 PROCEDURE — 80069 RENAL FUNCTION PANEL: CPT | Performed by: INTERNAL MEDICINE

## 2019-04-23 PROCEDURE — 36415 COLL VENOUS BLD VENIPUNCTURE: CPT | Performed by: INTERNAL MEDICINE

## 2019-05-07 DIAGNOSIS — N18.30 CKD (CHRONIC KIDNEY DISEASE) STAGE 3, GFR 30-59 ML/MIN (H): ICD-10-CM

## 2019-05-07 RX ORDER — LOSARTAN POTASSIUM 25 MG/1
12.5 TABLET ORAL DAILY
Qty: 45 TABLET | Refills: 3 | Status: SHIPPED | OUTPATIENT
Start: 2019-05-07 | End: 2019-09-16

## 2019-05-07 NOTE — TELEPHONE ENCOUNTER
Writer received a refill request from: [facility] in [place], [place]. [phone]    Medication: losartan (COZAAR) 25 MG tablet     Sig: Take 1/2 tablet (12.5mg) by mouth daily    Date last dispensed: 2/04/19      Pt's last office visit: 2/25/19  Next scheduled office visit: 8/26/19      Per the RN/LPN medication refill protocol, writer is to refill this request. If able to refill, please call the pt to inform them the RX was sent to the pharmacy. If unable to refill, route this encounter to the prescribing physician for authorization or further instructions.

## 2019-05-22 DIAGNOSIS — N18.30 CKD (CHRONIC KIDNEY DISEASE) STAGE 3, GFR 30-59 ML/MIN (H): Chronic | ICD-10-CM

## 2019-05-22 LAB
ALBUMIN SERPL-MCNC: 3.8 G/DL (ref 3.4–5)
ANION GAP SERPL CALCULATED.3IONS-SCNC: 7 MMOL/L (ref 3–14)
BUN SERPL-MCNC: 32 MG/DL (ref 7–30)
CALCIUM SERPL-MCNC: 9.2 MG/DL (ref 8.5–10.1)
CHLORIDE SERPL-SCNC: 107 MMOL/L (ref 94–109)
CO2 SERPL-SCNC: 24 MMOL/L (ref 20–32)
CREAT SERPL-MCNC: 1.92 MG/DL (ref 0.66–1.25)
GFR SERPL CREATININE-BSD FRML MDRD: 44 ML/MIN/{1.73_M2}
GLUCOSE SERPL-MCNC: 145 MG/DL (ref 70–99)
PHOSPHATE SERPL-MCNC: 2.6 MG/DL (ref 2.5–4.5)
POTASSIUM SERPL-SCNC: 4.3 MMOL/L (ref 3.4–5.3)
SODIUM SERPL-SCNC: 138 MMOL/L (ref 133–144)

## 2019-05-22 PROCEDURE — 36415 COLL VENOUS BLD VENIPUNCTURE: CPT | Performed by: INTERNAL MEDICINE

## 2019-05-22 PROCEDURE — 80069 RENAL FUNCTION PANEL: CPT | Performed by: INTERNAL MEDICINE

## 2019-06-12 ENCOUNTER — MYC REFILL (OUTPATIENT)
Dept: FAMILY MEDICINE | Facility: CLINIC | Age: 34
End: 2019-06-12

## 2019-06-12 DIAGNOSIS — G43.109 MIGRAINE WITH AURA AND WITHOUT STATUS MIGRAINOSUS, NOT INTRACTABLE: ICD-10-CM

## 2019-06-14 RX ORDER — PROPRANOLOL HCL 60 MG
60 CAPSULE, EXTENDED RELEASE 24HR ORAL DAILY
Qty: 90 CAPSULE | Refills: 1 | OUTPATIENT
Start: 2019-06-14

## 2019-06-14 NOTE — TELEPHONE ENCOUNTER
Prescription denied as duplicate -see script 6/12/2019          Ada Grant RN  [facility]

## 2019-06-26 DIAGNOSIS — N18.30 CKD (CHRONIC KIDNEY DISEASE) STAGE 3, GFR 30-59 ML/MIN (H): Chronic | ICD-10-CM

## 2019-06-26 LAB
ALBUMIN SERPL-MCNC: 3.6 G/DL (ref 3.4–5)
ANION GAP SERPL CALCULATED.3IONS-SCNC: 6 MMOL/L (ref 3–14)
BUN SERPL-MCNC: 34 MG/DL (ref 7–30)
CALCIUM SERPL-MCNC: 9.4 MG/DL (ref 8.5–10.1)
CHLORIDE SERPL-SCNC: 107 MMOL/L (ref 94–109)
CO2 SERPL-SCNC: 26 MMOL/L (ref 20–32)
CREAT SERPL-MCNC: 1.99 MG/DL (ref 0.66–1.25)
GFR SERPL CREATININE-BSD FRML MDRD: 42 ML/MIN/{1.73_M2}
GLUCOSE SERPL-MCNC: 154 MG/DL (ref 70–99)
PHOSPHATE SERPL-MCNC: 2.4 MG/DL (ref 2.5–4.5)
POTASSIUM SERPL-SCNC: 4.4 MMOL/L (ref 3.4–5.3)
SODIUM SERPL-SCNC: 139 MMOL/L (ref 133–144)

## 2019-06-26 PROCEDURE — 36415 COLL VENOUS BLD VENIPUNCTURE: CPT | Performed by: INTERNAL MEDICINE

## 2019-06-26 PROCEDURE — 80069 RENAL FUNCTION PANEL: CPT | Performed by: INTERNAL MEDICINE

## 2019-07-30 ENCOUNTER — OFFICE VISIT (OUTPATIENT)
Dept: FAMILY MEDICINE | Facility: CLINIC | Age: 34
End: 2019-07-30
Payer: COMMERCIAL

## 2019-07-30 VITALS
SYSTOLIC BLOOD PRESSURE: 119 MMHG | OXYGEN SATURATION: 98 % | HEART RATE: 79 BPM | BODY MASS INDEX: 34.87 KG/M2 | WEIGHT: 250 LBS | DIASTOLIC BLOOD PRESSURE: 82 MMHG

## 2019-07-30 DIAGNOSIS — R73.02 GLUCOSE INTOLERANCE (IMPAIRED GLUCOSE TOLERANCE): ICD-10-CM

## 2019-07-30 DIAGNOSIS — F41.9 MODERATE ANXIETY: ICD-10-CM

## 2019-07-30 DIAGNOSIS — E66.01 MORBID OBESITY (H): ICD-10-CM

## 2019-07-30 DIAGNOSIS — E55.9 VITAMIN D DEFICIENCY DISEASE: ICD-10-CM

## 2019-07-30 DIAGNOSIS — J45.20 MILD INTERMITTENT ASTHMA WITHOUT COMPLICATION: ICD-10-CM

## 2019-07-30 DIAGNOSIS — Z00.00 ROUTINE GENERAL MEDICAL EXAMINATION AT A HEALTH CARE FACILITY: Primary | ICD-10-CM

## 2019-07-30 DIAGNOSIS — G43.109 MIGRAINE WITH AURA AND WITHOUT STATUS MIGRAINOSUS, NOT INTRACTABLE: ICD-10-CM

## 2019-07-30 DIAGNOSIS — N18.30 CKD (CHRONIC KIDNEY DISEASE) STAGE 3, GFR 30-59 ML/MIN (H): ICD-10-CM

## 2019-07-30 LAB
ANION GAP SERPL CALCULATED.3IONS-SCNC: 8 MMOL/L (ref 3–14)
BUN SERPL-MCNC: 31 MG/DL (ref 7–30)
CALCIUM SERPL-MCNC: 9 MG/DL (ref 8.5–10.1)
CHLORIDE SERPL-SCNC: 109 MMOL/L (ref 94–109)
CHOLEST SERPL-MCNC: 155 MG/DL
CO2 SERPL-SCNC: 22 MMOL/L (ref 20–32)
CREAT SERPL-MCNC: 2.01 MG/DL (ref 0.66–1.25)
GFR SERPL CREATININE-BSD FRML MDRD: 41 ML/MIN/{1.73_M2}
GLUCOSE SERPL-MCNC: 128 MG/DL (ref 70–99)
HBA1C MFR BLD: 6.4 % (ref 0–5.6)
HDLC SERPL-MCNC: 46 MG/DL
LDLC SERPL CALC-MCNC: 86 MG/DL
NONHDLC SERPL-MCNC: 109 MG/DL
POTASSIUM SERPL-SCNC: 4.5 MMOL/L (ref 3.4–5.3)
SODIUM SERPL-SCNC: 139 MMOL/L (ref 133–144)
TRIGL SERPL-MCNC: 114 MG/DL
TSH SERPL DL<=0.005 MIU/L-ACNC: 0.76 MU/L (ref 0.4–4)

## 2019-07-30 PROCEDURE — 99395 PREV VISIT EST AGE 18-39: CPT | Performed by: INTERNAL MEDICINE

## 2019-07-30 PROCEDURE — 82306 VITAMIN D 25 HYDROXY: CPT | Performed by: INTERNAL MEDICINE

## 2019-07-30 PROCEDURE — 83036 HEMOGLOBIN GLYCOSYLATED A1C: CPT | Performed by: INTERNAL MEDICINE

## 2019-07-30 PROCEDURE — 80048 BASIC METABOLIC PNL TOTAL CA: CPT | Performed by: INTERNAL MEDICINE

## 2019-07-30 PROCEDURE — 36415 COLL VENOUS BLD VENIPUNCTURE: CPT | Performed by: INTERNAL MEDICINE

## 2019-07-30 PROCEDURE — 84443 ASSAY THYROID STIM HORMONE: CPT | Performed by: INTERNAL MEDICINE

## 2019-07-30 PROCEDURE — 80061 LIPID PANEL: CPT | Performed by: INTERNAL MEDICINE

## 2019-07-30 RX ORDER — LANOLIN ALCOHOL/MO/W.PET/CERES
6 CREAM (GRAM) TOPICAL
COMMUNITY

## 2019-07-30 RX ORDER — PROPRANOLOL HCL 60 MG
60 CAPSULE, EXTENDED RELEASE 24HR ORAL DAILY
Qty: 90 CAPSULE | Refills: 3 | Status: SHIPPED | OUTPATIENT
Start: 2019-07-30 | End: 2020-06-12

## 2019-07-30 RX ORDER — BUPROPION HYDROCHLORIDE 300 MG/1
300 TABLET ORAL DAILY
Qty: 90 TABLET | Refills: 3 | Status: SHIPPED | OUTPATIENT
Start: 2019-07-30 | End: 2020-06-12

## 2019-07-30 RX ORDER — ALBUTEROL SULFATE 0.83 MG/ML
2.5 SOLUTION RESPIRATORY (INHALATION) EVERY 4 HOURS PRN
Qty: 300 ML | Refills: 3 | Status: SHIPPED | OUTPATIENT
Start: 2019-07-30 | End: 2019-08-29

## 2019-07-30 RX ORDER — TOPIRAMATE 25 MG/1
TABLET, FILM COATED ORAL
Qty: 60 TABLET | Refills: 1 | Status: SHIPPED | OUTPATIENT
Start: 2019-07-30 | End: 2019-08-01

## 2019-07-30 RX ORDER — ALBUTEROL SULFATE 90 UG/1
2 AEROSOL, METERED RESPIRATORY (INHALATION) EVERY 4 HOURS PRN
Qty: 2 INHALER | Refills: 3 | Status: SHIPPED | OUTPATIENT
Start: 2019-07-30

## 2019-07-30 ASSESSMENT — ENCOUNTER SYMPTOMS
CHILLS: 0
NAUSEA: 0
PALPITATIONS: 0
CONSTIPATION: 0
ABDOMINAL PAIN: 0
SORE THROAT: 0
DYSURIA: 0
ARTHRALGIAS: 0
NERVOUS/ANXIOUS: 0
HEARTBURN: 0
HEADACHES: 0
FREQUENCY: 0
COUGH: 0
DIZZINESS: 0
HEMATURIA: 0
WEAKNESS: 0
MYALGIAS: 0
DIARRHEA: 0
JOINT SWELLING: 0
SHORTNESS OF BREATH: 0
HEMATOCHEZIA: 0
FEVER: 0
PARESTHESIAS: 0
EYE PAIN: 0

## 2019-07-30 ASSESSMENT — ANXIETY QUESTIONNAIRES
6. BECOMING EASILY ANNOYED OR IRRITABLE: SEVERAL DAYS
GAD7 TOTAL SCORE: 5
3. WORRYING TOO MUCH ABOUT DIFFERENT THINGS: SEVERAL DAYS
5. BEING SO RESTLESS THAT IT IS HARD TO SIT STILL: NOT AT ALL
IF YOU CHECKED OFF ANY PROBLEMS ON THIS QUESTIONNAIRE, HOW DIFFICULT HAVE THESE PROBLEMS MADE IT FOR YOU TO DO YOUR WORK, TAKE CARE OF THINGS AT HOME, OR GET ALONG WITH OTHER PEOPLE: SOMEWHAT DIFFICULT
7. FEELING AFRAID AS IF SOMETHING AWFUL MIGHT HAPPEN: NOT AT ALL
2. NOT BEING ABLE TO STOP OR CONTROL WORRYING: SEVERAL DAYS
1. FEELING NERVOUS, ANXIOUS, OR ON EDGE: SEVERAL DAYS

## 2019-07-30 ASSESSMENT — ASTHMA QUESTIONNAIRES
QUESTION_1 LAST FOUR WEEKS HOW MUCH OF THE TIME DID YOUR ASTHMA KEEP YOU FROM GETTING AS MUCH DONE AT WORK, SCHOOL OR AT HOME: A LITTLE OF THE TIME
QUESTION_4 LAST FOUR WEEKS HOW OFTEN HAVE YOU USED YOUR RESCUE INHALER OR NEBULIZER MEDICATION (SUCH AS ALBUTEROL): NOT AT ALL
QUESTION_5 LAST FOUR WEEKS HOW WOULD YOU RATE YOUR ASTHMA CONTROL: WELL CONTROLLED
QUESTION_3 LAST FOUR WEEKS HOW OFTEN DID YOUR ASTHMA SYMPTOMS (WHEEZING, COUGHING, SHORTNESS OF BREATH, CHEST TIGHTNESS OR PAIN) WAKE YOU UP AT NIGHT OR EARLIER THAN USUAL IN THE MORNING: NOT AT ALL
QUESTION_2 LAST FOUR WEEKS HOW OFTEN HAVE YOU HAD SHORTNESS OF BREATH: ONCE OR TWICE A WEEK
ACT_TOTALSCORE: 22
ACUTE_EXACERBATION_TODAY: NO

## 2019-07-30 ASSESSMENT — PATIENT HEALTH QUESTIONNAIRE - PHQ9
SUM OF ALL RESPONSES TO PHQ QUESTIONS 1-9: 6
5. POOR APPETITE OR OVEREATING: SEVERAL DAYS

## 2019-07-30 NOTE — PROGRESS NOTES
SUBJECTIVE:   CC: Abelino Gracia is an 34 year old male who presents for preventative health visit.     Healthy Habits:     Getting at least 3 servings of Calcium per day:  Yes    Bi-annual eye exam:  Yes    Dental care twice a year:  NO    Sleep apnea or symptoms of sleep apnea:  Sleep apnea    Diet:  Diabetic    Frequency of exercise:  None    Taking medications regularly:  Yes    Medication side effects:  None    PHQ-2 Total Score: 1    Additional concerns today:  No    gfr down with keto and exercise and diet .  Down to 37.  Patient is working with nephrology for improvement.  Has been gaining weight since stopping the keto diet  Patient has sleep apnea complicating the symptoms    Energy level are diet have been affected            Today's PHQ-2 Score:   PHQ-2 (  [employer]) 2019   Q1: Little interest or pleasure in doing things 1   Q2: Feeling down, depressed or hopeless 0   PHQ-2 Score 1   Q1: Little interest or pleasure in doing things Several days   Q2: Feeling down, depressed or hopeless Not at all   PHQ-2 Score 1       Abuse: Current or Past(Physical, Sexual or Emotional)- No  Do you feel safe in your environment? Yes    Social History     Tobacco Use     Smoking status: Former Smoker     Packs/day: 1.00     Years: 20.00     Pack years: 20.00     Types: Cigarettes     Start date: 1998     Last attempt to quit: 3/5/2017     Years since quittin.4     Smokeless tobacco: Never Used     Tobacco comment: Have tried chantix   Substance Use Topics     Alcohol use: No     Alcohol/week: 0.0 oz     Comment: SOBER SINCE 2009     If you drink alcohol do you typically have >3 drinks per day or >7 drinks per week? No    Alcohol Use 2019   Prescreen: >3 drinks/day or >7 drinks/week? Not Applicable   Prescreen: >3 drinks/day or >7 drinks/week? -   No flowsheet data found.    Last PSA: No results found for: PSA    Reviewed orders with patient. Reviewed health maintenance and updated orders  accordingly - Yes  Lab work is in process  Labs reviewed in EPIC  BP Readings from Last 3 Encounters:   19 119/82   19 125/85   09/10/18 125/85    Wt Readings from Last 3 Encounters:   19 113.4 kg (250 lb)   19 113.4 kg (250 lb)   10/22/18 112.5 kg (248 lb)                  Patient Active Problem List   Diagnosis     Major depressive disorder     Insomnia     Migraine headache     Asthma, mild intermittent     DESHAUN (obstructive sleep apnea)- mild (AHI 5)     CARDIOVASCULAR SCREENING; LDL GOAL LESS THAN 160     CKD (chronic kidney disease) stage 3, GFR 30-59 ml/min (H)     Leukocytosis     QURESHI (nonalcoholic steatohepatitis)     BMI 31.0-31.9,adult     Hypophosphatemia     Alcohol dependence in remission (H)     Generalized anxiety disorder     Morbid obesity (H)     Secondary renal hyperparathyroidism (H)     Past Surgical History:   Procedure Laterality Date     APPENDECTOMY  2004     C NONSPECIFIC PROCEDURE   2011     Laparoscopic cholecystectomy.     CHOLECYSTECTOMY       COLONOSCOPY N/A 2016    Procedure: COLONOSCOPY;  Surgeon: Mauro Tan MD;  Location:  GI     TONSILLECTOMY  2006       Social History     Tobacco Use     Smoking status: Former Smoker     Packs/day: 1.00     Years: 20.00     Pack years: 20.00     Types: Cigarettes     Start date: 1998     Last attempt to quit: 3/5/2017     Years since quittin.4     Smokeless tobacco: Never Used     Tobacco comment: Have tried chantix   Substance Use Topics     Alcohol use: No     Alcohol/week: 0.0 oz     Comment: SOBER SINCE 2009     Family History   Problem Relation Age of Onset     Allergies Mother      Allergies Brother      Heart Disease Maternal Grandfather      Hypertension Maternal Grandfather      Other Cancer Maternal Grandfather      Asthma Other      Cancer - colorectal Maternal Grandmother 80     Cancer Maternal Grandmother 40        uterine cancer     Kidney Disease Maternal  Grandmother          of kidney failure - ~ age 80     Diabetes Maternal Grandmother      Colon Cancer Maternal Grandmother      Other Cancer Maternal Grandmother          Current Outpatient Medications   Medication Sig Dispense Refill     acetaminophen (TYLENOL) 325 MG tablet Take 325 mg by mouth as needed        albuterol (PROAIR HFA) 108 (90 Base) MCG/ACT inhaler Inhale 2 puffs into the lungs every 4 hours as needed 2 Inhaler 3     albuterol (PROVENTIL) (2.5 MG/3ML) 0.083% neb solution Take 1 vial (2.5 mg) by nebulization every 4 hours as needed for shortness of breath / dyspnea 300 mL 3     blood glucose monitoring (SARA CONTOUR MONITOR) meter device kit Use to test blood sugars one time daily. Strips Ex: 2019, Lot: [id number], SN: [id number] 1 kit 0     blood glucose monitoring (SARA CONTOUR) test strip Use to test blood sugars one time daily 50 strip 11     blood glucose monitoring (SARA MICROLET) lancets Use to test blood sugar 1 times daily 100 each 11     buPROPion (WELLBUTRIN XL) 300 MG 24 hr tablet Take 1 tablet (300 mg) by mouth daily 90 tablet 3     cetirizine (ZYRTEC) 10 MG tablet Take 10 mg by mouth daily as needed        cholecalciferol (VITAMIN D3) 1000 UNIT tablet Take 1 tablet (1,000 Units) by mouth daily Start after Ergocalciferol therapy complete. 92 tablet 3     Fexofenadine HCl (ALLEGRA PO) Take 60 mg by mouth daily as needed        fluticasone (FLONASE) 50 MCG/ACT nasal spray Spray 1-2 sprays into both nostrils daily as needed  1 Package 11     losartan (COZAAR) 25 MG tablet Take 0.5 tablets (12.5 mg) by mouth daily 45 tablet 3     melatonin 3 MG tablet Take 6 mg by mouth nightly as needed for sleep        propranolol ER (INDERAL LA) 60 MG 24 hr capsule Take 1 capsule (60 mg) by mouth daily 90 capsule 3     sitagliptin (JANUVIA) 25 MG tablet Take 1 tablet (25 mg) by mouth daily 90 tablet 3     topiramate (TOPAMAX) 25 MG tablet 25 mg po daily 2 weeks, then 25 mg po BID 2 weeks then  50 mg po daily 2 weeks then 50 mg po BID 60 tablet 1     Allergies   Allergen Reactions     Clarithromycin Hives     Penicillins      Sulfa Drugs      Adhesive Tape Rash       Reviewed and updated as needed this visit by clinical staff  Allergies  Meds         Reviewed and updated as needed this visit by Provider            Review of Systems  CONSTITUTIONAL: NEGATIVE for fever, chills, change in weight  INTEGUMENTARY/SKIN: NEGATIVE for worrisome rashes, moles or lesions  EYES: NEGATIVE for vision changes or irritation  ENT: NEGATIVE for ear, mouth and throat problems  RESP: NEGATIVE for significant cough or SOB  CV: NEGATIVE for chest pain, palpitations or peripheral edema  GI: NEGATIVE for nausea, abdominal pain, heartburn, or change in bowel habits   male: negative for dysuria, hematuria, decreased urinary stream, erectile dysfunction, urethral discharge  MUSCULOSKELETAL: NEGATIVE for significant arthralgias or myalgia  NEURO: NEGATIVE for weakness, dizziness or paresthesias  PSYCHIATRIC: NEGATIVE for changes in mood or affect    OBJECTIVE:   There were no vitals taken for this visit.    Physical Exam  GENERAL: healthy, alert and no distress  NECK: no adenopathy, no asymmetry, masses, or scars and thyroid normal to palpation  RESP: lungs clear to auscultation - no rales, rhonchi or wheezes  CV: regular rate and rhythm, normal S1 S2, no S3 or S4, no murmur, click or rub, no peripheral edema and peripheral pulses strong  ABDOMEN: soft, nontender, no hepatosplenomegaly, no masses and bowel sounds normal  MS: no gross musculoskeletal defects noted, no edema    Diagnostic Test Results:  Labs reviewed in Epic  Results for orders placed or performed in visit on 07/30/19   Lipid panel reflex to direct LDL Fasting   Result Value Ref Range    Cholesterol 155 <200 mg/dL    Triglycerides 114 <150 mg/dL    HDL Cholesterol 46 >39 mg/dL    LDL Cholesterol Calculated 86 <100 mg/dL    Non HDL Cholesterol 109 <130 mg/dL    Hemoglobin A1c   Result Value Ref Range    Hemoglobin A1C 6.4 (H) 0 - 5.6 %   Basic metabolic panel   Result Value Ref Range    Sodium 139 133 - 144 mmol/L    Potassium 4.5 3.4 - 5.3 mmol/L    Chloride 109 94 - 109 mmol/L    Carbon Dioxide 22 20 - 32 mmol/L    Anion Gap 8 3 - 14 mmol/L    Glucose 128 (H) 70 - 99 mg/dL    Urea Nitrogen 31 (H) 7 - 30 mg/dL    Creatinine 2.01 (H) 0.66 - 1.25 mg/dL    GFR Estimate 41 (L) >60 mL/min/[1.73_m2]    GFR Estimate If Black 48 (L) >60 mL/min/[1.73_m2]    Calcium 9.0 8.5 - 10.1 mg/dL   Vitamin D Deficiency   Result Value Ref Range    Vitamin D Deficiency screening 39 20 - 75 ug/L   **TSH with free T4 reflex FUTURE anytime   Result Value Ref Range    TSH 0.76 0.40 - 4.00 mU/L       ASSESSMENT/PLAN:       ICD-10-CM    1. Routine general medical examination at a health care facility Z00.00    2. Mild intermittent asthma without complication J45.20 albuterol (PROAIR HFA) 108 (90 Base) MCG/ACT inhaler     albuterol (PROVENTIL) (2.5 MG/3ML) 0.083% neb solution   3. Moderate anxiety F41.9 buPROPion (WELLBUTRIN XL) 300 MG 24 hr tablet   4. CKD (chronic kidney disease) stage 3, GFR 30-59 ml/min (H) N18.3 Basic metabolic panel   5. Migraine with aura and without status migrainosus, not intractable G43.109 propranolol ER (INDERAL LA) 60 MG 24 hr capsule   6. Glucose intolerance (impaired glucose tolerance) R73.02 sitagliptin (JANUVIA) 25 MG tablet     Lipid panel reflex to direct LDL Fasting     Hemoglobin A1c     **TSH with free T4 reflex FUTURE anytime   7. Vitamin D deficiency disease E55.9 Vitamin D Deficiency   8. Morbid obesity (H) E66.01 topiramate (TOPAMAX) 25 MG tablet       COUNSELING:   Reviewed preventive health counseling, as reflected in patient instructions       Regular exercise       Healthy diet/nutrition       Vision screening       Hearing screening       Safe sex practices/STD prevention       HIV screeninx in teen years, 1x in adult years, and at intervals if  "high risk    Estimated body mass index is 34.87 kg/m  as calculated from the following:    Height as of 8/31/18: 1.803 m (5' 11\").    Weight as of 2/25/19: 113.4 kg (250 lb).     Weight management plan: Discussed healthy diet and exercise guidelines     reports that he quit smoking about 2 years ago. His smoking use included cigarettes. He started smoking about 21 years ago. He has a 20.00 pack-year smoking history. He has never used smokeless tobacco.    BP     119/82  7/31/2019    Lab Results   Component Value Date     07/30/2019     Lab Results   Component Value Date    A1C 6.4 07/30/2019     Lab Results   Component Value Date    LDL 86 07/30/2019     Lab Results   Component Value Date    MICROL 12 02/25/2019     No results found for: MICROALBUMIN  Counseling Resources:  ATP IV Guidelines  Pooled Cohorts Equation Calculator  FRAX Risk Assessment  ICSI Preventive Guidelines  Dietary Guidelines for Americans, 2010  USDA's MyPlate  ASA Prophylaxis  Lung CA Screening    Roberth Tavares MD  [facility]  "

## 2019-07-31 LAB — DEPRECATED CALCIDIOL+CALCIFEROL SERPL-MC: 39 UG/L (ref 20–75)

## 2019-07-31 ASSESSMENT — ANXIETY QUESTIONNAIRES: GAD7 TOTAL SCORE: 5

## 2019-07-31 ASSESSMENT — ASTHMA QUESTIONNAIRES: ACT_TOTALSCORE: 22

## 2019-08-01 ENCOUNTER — TELEPHONE (OUTPATIENT)
Dept: FAMILY MEDICINE | Facility: CLINIC | Age: 34
End: 2019-08-01

## 2019-08-01 DIAGNOSIS — E66.01 MORBID OBESITY (H): ICD-10-CM

## 2019-08-01 RX ORDER — TOPIRAMATE 25 MG/1
TABLET, FILM COATED ORAL
Qty: 60 TABLET | Refills: 1 | Status: SHIPPED | OUTPATIENT
Start: 2019-08-01 | End: 2019-09-19

## 2019-08-01 NOTE — TELEPHONE ENCOUNTER
Reason for Call:  Other     Detailed comments:  Pharmacy is calling to get clarification on topiramate (TOPAMAX) 25 MG tablet.  The patient is waiting at pharmacy so if you could call pharmacy ASAP.    Phone Number Patient can be reached at: Other phone number:  [phone]    Best Time: ASAP    Can we leave a detailed message on this number? YES    Call taken on 8/1/2019 at 12:43 PM by Arabella Hathaway

## 2019-08-01 NOTE — TELEPHONE ENCOUNTER
Called Pharmacist Damir please clarify the directions on the Topamax  .  topiramate (TOPAMAX) 25 MG tablet 60 tablet 1 2019  --   Si mg po daily 2 weeks, then 25 mg po BID 2 weeks then 50 mg po daily 2 weeks then 50 mg po BID     Should it say 25 mg po daily X 2 weeks  Then 25 mg po BID X 2 weeks  Then 50 mg daily?    Then 50 mg BID?    Ada Grant RN  [facility]

## 2019-08-15 DIAGNOSIS — N18.30 CKD (CHRONIC KIDNEY DISEASE) STAGE 3, GFR 30-59 ML/MIN (H): Primary | ICD-10-CM

## 2019-09-16 ENCOUNTER — OFFICE VISIT (OUTPATIENT)
Dept: NEPHROLOGY | Facility: CLINIC | Age: 34
End: 2019-09-16
Payer: COMMERCIAL

## 2019-09-16 VITALS
HEART RATE: 91 BPM | SYSTOLIC BLOOD PRESSURE: 118 MMHG | WEIGHT: 252 LBS | OXYGEN SATURATION: 97 % | DIASTOLIC BLOOD PRESSURE: 83 MMHG | BODY MASS INDEX: 35.15 KG/M2

## 2019-09-16 DIAGNOSIS — N25.81 SECONDARY RENAL HYPERPARATHYROIDISM (H): ICD-10-CM

## 2019-09-16 DIAGNOSIS — N18.30 CKD (CHRONIC KIDNEY DISEASE) STAGE 3, GFR 30-59 ML/MIN (H): ICD-10-CM

## 2019-09-16 DIAGNOSIS — E83.39 HYPOPHOSPHATEMIA: Primary | ICD-10-CM

## 2019-09-16 DIAGNOSIS — R73.09 ELEVATED GLUCOSE: ICD-10-CM

## 2019-09-16 DIAGNOSIS — K75.81 NASH (NONALCOHOLIC STEATOHEPATITIS): ICD-10-CM

## 2019-09-16 LAB
ALBUMIN SERPL-MCNC: 3.5 G/DL (ref 3.4–5)
ANION GAP SERPL CALCULATED.3IONS-SCNC: 6 MMOL/L (ref 3–14)
BUN SERPL-MCNC: 32 MG/DL (ref 7–30)
CALCIUM SERPL-MCNC: 9 MG/DL (ref 8.5–10.1)
CHLORIDE SERPL-SCNC: 111 MMOL/L (ref 94–109)
CO2 SERPL-SCNC: 22 MMOL/L (ref 20–32)
CREAT SERPL-MCNC: 2.08 MG/DL (ref 0.66–1.25)
CREAT UR-MCNC: 51 MG/DL
GFR SERPL CREATININE-BSD FRML MDRD: 40 ML/MIN/{1.73_M2}
GLUCOSE SERPL-MCNC: 208 MG/DL (ref 70–99)
HGB BLD-MCNC: 15.6 G/DL (ref 13.3–17.7)
PHOSPHATE SERPL-MCNC: 1.6 MG/DL (ref 2.5–4.5)
POTASSIUM SERPL-SCNC: 4.3 MMOL/L (ref 3.4–5.3)
PROT UR-MCNC: 0.15 G/L
PROT/CREAT 24H UR: 0.29 G/G CR (ref 0–0.2)
PTH-INTACT SERPL-MCNC: 44 PG/ML (ref 18–80)
SODIUM SERPL-SCNC: 139 MMOL/L (ref 133–144)

## 2019-09-16 PROCEDURE — 85018 HEMOGLOBIN: CPT | Performed by: INTERNAL MEDICINE

## 2019-09-16 PROCEDURE — 84156 ASSAY OF PROTEIN URINE: CPT | Performed by: INTERNAL MEDICINE

## 2019-09-16 PROCEDURE — 83970 ASSAY OF PARATHORMONE: CPT | Performed by: INTERNAL MEDICINE

## 2019-09-16 PROCEDURE — 99214 OFFICE O/P EST MOD 30 MIN: CPT | Performed by: INTERNAL MEDICINE

## 2019-09-16 PROCEDURE — 82306 VITAMIN D 25 HYDROXY: CPT | Performed by: INTERNAL MEDICINE

## 2019-09-16 PROCEDURE — 80069 RENAL FUNCTION PANEL: CPT | Performed by: INTERNAL MEDICINE

## 2019-09-16 PROCEDURE — 36415 COLL VENOUS BLD VENIPUNCTURE: CPT | Performed by: INTERNAL MEDICINE

## 2019-09-16 RX ORDER — LOSARTAN POTASSIUM 25 MG/1
12.5 TABLET ORAL DAILY
Qty: 45 TABLET | Refills: 3 | Status: SHIPPED | OUTPATIENT
Start: 2019-09-16 | End: 2020-06-12

## 2019-09-16 ASSESSMENT — PAIN SCALES - GENERAL: PAINLEVEL: NO PAIN (0)

## 2019-09-16 NOTE — NURSING NOTE
Abelino Gracia's goals for this visit include:   Chief Complaint   Patient presents with     RECHECK     7mo recheck CKD       He requests these members of his care team be copied on today's visit information: no    PCP: Roberth Tavares    Referring Provider:  No referring provider defined for this encounter.    /83 (BP Location: Right arm, Patient Position: Sitting, Cuff Size: Adult Large)   Pulse 91   Wt 114.3 kg (252 lb)   SpO2 97%   BMI 35.15 kg/m      Do you need any medication refills at today's visit? Yes    Irene Ramirez LPN

## 2019-09-16 NOTE — PATIENT INSTRUCTIONS
1. Start phos replacement  2. Labs again mid to later next week (comprehensive panel)  3. I'll update you on the vitamin D dose that I send in   4. Labs every 2 months (renal panel)  5. Follow-up in 6 months (all the labs)

## 2019-09-17 LAB
DEPRECATED CALCIDIOL+CALCIFEROL SERPL-MC: <49 UG/L (ref 20–75)
VITAMIN D2 SERPL-MCNC: <5 UG/L
VITAMIN D3 SERPL-MCNC: 44 UG/L

## 2019-09-18 ENCOUNTER — TELEPHONE (OUTPATIENT)
Dept: FAMILY MEDICINE | Facility: CLINIC | Age: 34
End: 2019-09-18

## 2019-09-18 DIAGNOSIS — E66.01 MORBID OBESITY (H): ICD-10-CM

## 2019-09-18 NOTE — TELEPHONE ENCOUNTER
Patient sent the following message via [employer] appointment request:    I have an appt for 9/30 to talk  about refilling topamax, i won't have enough however i have hypophosphatemia found by my nephrologist and she wants me to start a supplement and a low potassium renal diet with the supplement. id rather start to taper off or down on the topamax. I am having symptoms of muscle weakness and nausea and overall not feeling well. id rather not go on a renal diet could not find earlier appts    Routing to review symptoms/appointment request.

## 2019-09-19 NOTE — TELEPHONE ENCOUNTER
It is OK to start to wean off the topamax  Decrease by 25 mg per week until off completely    ( can change dose weekly instead of every two weeks like when we started)

## 2019-09-19 NOTE — TELEPHONE ENCOUNTER
Called patient and relayed the message below. He stated he will still need a refill be fore he is seen on . Nurse cued 30 tablets.    Requested Prescriptions   Pending Prescriptions Disp Refills     topiramate (TOPAMAX) 25 MG tablet 30 tablet 0     Si mg po daily 2 weeks, then 25 mg po BID 2 weeks then 50 mg po QAM and 25 mg QPM 2 weeks then 50 mg po BID       There is no refill protocol information for this order        Rosibel Montaño RN

## 2019-09-20 RX ORDER — TOPIRAMATE 25 MG/1
TABLET, FILM COATED ORAL
Qty: 30 TABLET | Refills: 0 | Status: SHIPPED | OUTPATIENT
Start: 2019-09-20 | End: 2019-12-16

## 2019-09-27 DIAGNOSIS — N18.30 CKD (CHRONIC KIDNEY DISEASE) STAGE 3, GFR 30-59 ML/MIN (H): ICD-10-CM

## 2019-09-27 LAB
ALBUMIN SERPL-MCNC: 3.9 G/DL (ref 3.4–5)
ALP SERPL-CCNC: 88 U/L (ref 40–150)
ALT SERPL W P-5'-P-CCNC: 84 U/L (ref 0–70)
ANION GAP SERPL CALCULATED.3IONS-SCNC: 8 MMOL/L (ref 3–14)
AST SERPL W P-5'-P-CCNC: 34 U/L (ref 0–45)
BILIRUB SERPL-MCNC: 0.6 MG/DL (ref 0.2–1.3)
BUN SERPL-MCNC: 44 MG/DL (ref 7–30)
CALCIUM SERPL-MCNC: 9.2 MG/DL (ref 8.5–10.1)
CHLORIDE SERPL-SCNC: 111 MMOL/L (ref 94–109)
CO2 SERPL-SCNC: 21 MMOL/L (ref 20–32)
CREAT SERPL-MCNC: 2.29 MG/DL (ref 0.66–1.25)
CREAT UR-MCNC: 98 MG/DL
GFR SERPL CREATININE-BSD FRML MDRD: 35 ML/MIN/{1.73_M2}
GLUCOSE SERPL-MCNC: 133 MG/DL (ref 70–99)
HGB BLD-MCNC: 16.1 G/DL (ref 13.3–17.7)
PHOSPHATE SERPL-MCNC: 2.8 MG/DL (ref 2.5–4.5)
POTASSIUM SERPL-SCNC: 4.2 MMOL/L (ref 3.4–5.3)
PROT SERPL-MCNC: 7.7 G/DL (ref 6.8–8.8)
PROT UR-MCNC: 0.16 G/L
PROT/CREAT 24H UR: 0.16 G/G CR (ref 0–0.2)
PTH-INTACT SERPL-MCNC: 69 PG/ML (ref 18–80)
SODIUM SERPL-SCNC: 140 MMOL/L (ref 133–144)

## 2019-09-27 PROCEDURE — 85018 HEMOGLOBIN: CPT | Performed by: INTERNAL MEDICINE

## 2019-09-27 PROCEDURE — 84100 ASSAY OF PHOSPHORUS: CPT | Performed by: INTERNAL MEDICINE

## 2019-09-27 PROCEDURE — 83970 ASSAY OF PARATHORMONE: CPT | Performed by: INTERNAL MEDICINE

## 2019-09-27 PROCEDURE — 36415 COLL VENOUS BLD VENIPUNCTURE: CPT | Performed by: INTERNAL MEDICINE

## 2019-09-27 PROCEDURE — 84156 ASSAY OF PROTEIN URINE: CPT | Performed by: INTERNAL MEDICINE

## 2019-09-27 PROCEDURE — 80053 COMPREHEN METABOLIC PANEL: CPT | Performed by: INTERNAL MEDICINE

## 2019-09-30 ENCOUNTER — OFFICE VISIT (OUTPATIENT)
Dept: FAMILY MEDICINE | Facility: CLINIC | Age: 34
End: 2019-09-30
Payer: COMMERCIAL

## 2019-09-30 VITALS
WEIGHT: 247 LBS | DIASTOLIC BLOOD PRESSURE: 88 MMHG | OXYGEN SATURATION: 98 % | HEIGHT: 71 IN | BODY MASS INDEX: 34.58 KG/M2 | SYSTOLIC BLOOD PRESSURE: 121 MMHG | HEART RATE: 107 BPM

## 2019-09-30 DIAGNOSIS — Z23 NEED FOR PROPHYLACTIC VACCINATION AND INOCULATION AGAINST INFLUENZA: Primary | ICD-10-CM

## 2019-09-30 DIAGNOSIS — N18.30 CKD (CHRONIC KIDNEY DISEASE) STAGE 3, GFR 30-59 ML/MIN (H): Primary | Chronic | ICD-10-CM

## 2019-09-30 DIAGNOSIS — K75.81 NASH (NONALCOHOLIC STEATOHEPATITIS): Chronic | ICD-10-CM

## 2019-09-30 DIAGNOSIS — N18.30 CKD (CHRONIC KIDNEY DISEASE) STAGE 3, GFR 30-59 ML/MIN (H): Chronic | ICD-10-CM

## 2019-09-30 DIAGNOSIS — N25.81 SECONDARY RENAL HYPERPARATHYROIDISM (H): ICD-10-CM

## 2019-09-30 DIAGNOSIS — E83.39 HYPOPHOSPHATEMIA: ICD-10-CM

## 2019-09-30 PROCEDURE — 99214 OFFICE O/P EST MOD 30 MIN: CPT | Mod: 25 | Performed by: INTERNAL MEDICINE

## 2019-09-30 PROCEDURE — 90471 IMMUNIZATION ADMIN: CPT | Performed by: INTERNAL MEDICINE

## 2019-09-30 PROCEDURE — 90686 IIV4 VACC NO PRSV 0.5 ML IM: CPT | Performed by: INTERNAL MEDICINE

## 2019-09-30 ASSESSMENT — MIFFLIN-ST. JEOR: SCORE: 2072.51

## 2019-09-30 NOTE — PROGRESS NOTES
Subjective     Abelino Gracia is a 34 year old male who presents to clinic today for the following health issues:    HPI   Medication review; Topamax. Needs something else due to kidney issues      How many servings of fruits and vegetables do you eat daily?  0-1    On average, how many sweetened beverages do you drink each day (soda, juice, sweet tea, etc)?   0    How many days per week do you miss taking your medication? 0    Keto and low potassium drops the GFR          Patient Active Problem List   Diagnosis     Major depressive disorder     Insomnia     Migraine headache     Asthma, mild intermittent     DESHAUN (obstructive sleep apnea)- mild (AHI 5)     CARDIOVASCULAR SCREENING; LDL GOAL LESS THAN 160     CKD (chronic kidney disease) stage 3, GFR 30-59 ml/min (H)     Leukocytosis     QURESHI (nonalcoholic steatohepatitis)     BMI 31.0-31.9,adult     Hypophosphatemia     Alcohol dependence in remission (H)     Generalized anxiety disorder     Morbid obesity (H)     Secondary renal hyperparathyroidism (H)     Past Surgical History:   Procedure Laterality Date     APPENDECTOMY  2004     C NONSPECIFIC PROCEDURE   2011     Laparoscopic cholecystectomy.     CHOLECYSTECTOMY       COLONOSCOPY N/A 2016    Procedure: COLONOSCOPY;  Surgeon: Mauro Tan MD;  Location:  GI     TONSILLECTOMY  2006       Social History     Tobacco Use     Smoking status: Former Smoker     Packs/day: 1.00     Years: 20.00     Pack years: 20.00     Types: Cigarettes     Start date: 1998     Last attempt to quit: 3/5/2017     Years since quittin.5     Smokeless tobacco: Never Used     Tobacco comment: Have tried chantix   Substance Use Topics     Alcohol use: No     Alcohol/week: 0.0 standard drinks     Comment: SOBER SINCE 2009     Family History   Problem Relation Age of Onset     Allergies Mother      Allergies Brother      Heart Disease Maternal Grandfather      Hypertension Maternal  Grandfather      Other Cancer Maternal Grandfather      Asthma Other      Cancer - colorectal Maternal Grandmother 80     Cancer Maternal Grandmother 40        uterine cancer     Kidney Disease Maternal Grandmother          of kidney failure - ~ age 80     Diabetes Maternal Grandmother      Colon Cancer Maternal Grandmother      Other Cancer Maternal Grandmother          Current Outpatient Medications   Medication Sig Dispense Refill     acetaminophen (TYLENOL) 325 MG tablet Take 325 mg by mouth as needed        albuterol (PROAIR HFA) 108 (90 Base) MCG/ACT inhaler Inhale 2 puffs into the lungs every 4 hours as needed 2 Inhaler 3     albuterol (PROVENTIL) (2.5 MG/3ML) 0.083% neb solution Take 1 vial (2.5 mg) by nebulization every 4 hours as needed for shortness of breath / dyspnea 300 mL 3     blood glucose monitoring (SARA CONTOUR MONITOR) meter device kit Use to test blood sugars one time daily. Strips Ex: 2019, Lot: [id number], SN: [id number] 1 kit 0     blood glucose monitoring (SARA CONTOUR) test strip Use to test blood sugars one time daily 50 strip 11     blood glucose monitoring (SARA MICROLET) lancets Use to test blood sugar 1 times daily 100 each 11     buPROPion (WELLBUTRIN XL) 300 MG 24 hr tablet Take 1 tablet (300 mg) by mouth daily 90 tablet 3     cetirizine (ZYRTEC) 10 MG tablet Take 10 mg by mouth daily as needed        cholecalciferol (VITAMIN D3) 1000 UNIT tablet Take 1 tablet (1,000 Units) by mouth daily Start after Ergocalciferol therapy complete. 92 tablet 3     Fexofenadine HCl (ALLEGRA PO) Take 60 mg by mouth daily as needed        fluticasone (FLONASE) 50 MCG/ACT nasal spray Spray 1-2 sprays into both nostrils daily as needed  1 Package 11     losartan (COZAAR) 25 MG tablet Take 0.5 tablets (12.5 mg) by mouth daily 45 tablet 3     melatonin 3 MG tablet Take 6 mg by mouth nightly as needed for sleep        potassium phosphate, monobasic, (K-PHOS) 500 MG tablet Take 1 tablet (500  mg) by mouth 2 times daily 180 tablet 0     propranolol ER (INDERAL LA) 60 MG 24 hr capsule Take 1 capsule (60 mg) by mouth daily 90 capsule 3     sitagliptin (JANUVIA) 25 MG tablet Take 1 tablet (25 mg) by mouth daily 90 tablet 3     topiramate (TOPAMAX) 25 MG tablet Starting at 50 mg BID, decrease by 25 mg each week until off 30 tablet 0     Allergies   Allergen Reactions     Clarithromycin Hives     Penicillins      Sulfa Drugs      Topamax [Topiramate]      Decreased gfr and decrease phosphorous with use     Adhesive Tape Rash     Recent Labs   Lab Test 09/27/19  0710 09/16/19  0734 07/30/19  0903  08/28/18  0736 08/28/18  0735  02/08/18  0916  03/07/16  0810  08/24/15  0728 05/13/15  0935   A1C  --   --  6.4*  --   --  6.4*  --   --   --   --   --   --  6.3*   LDL  --   --  86  --   --   --   --  91  --  103*  --   --   --    HDL  --   --  46  --   --   --   --  40  --  35*  --   --   --    TRIG  --   --  114  --   --   --   --  140  --  122  --   --   --    ALT 84*  --   --   --  128* 126*  --  107*   < >  --    < >  --   --    CR 2.29* 2.08* 2.01*   < > 1.92* 1.90*   < > 1.92*   < >  --    < > 1.46* 1.65*   GFRESTIMATED 35* 40* 41*   < > 40* 40*   < > 40*   < >  --    < > 56* 49*   GFRESTBLACK 41* 46* 48*   < > 48* 49*   < > 48*   < >  --    < > 67 59*   POTASSIUM 4.2 4.3 4.5   < > 4.3 4.2   < > 4.3   < >  --    < > 4.6  --    TSH  --   --  0.76  --   --   --   --   --   --   --   --  1.00  --     < > = values in this interval not displayed.      BP Readings from Last 3 Encounters:   09/30/19 121/88   09/16/19 118/83   07/30/19 119/82    Wt Readings from Last 3 Encounters:   09/30/19 112 kg (247 lb)   09/16/19 114.3 kg (252 lb)   07/30/19 113.4 kg (250 lb)                      Reviewed and updated as needed this visit by Provider         Review of Systems   ROS COMP: Constitutional, HEENT, cardiovascular, pulmonary, gi and gu systems are negative, except as otherwise noted.      Objective    /88 (BP  "Location: Right arm, Patient Position: Chair, Cuff Size: Adult Large)   Pulse 107   Ht 1.803 m (5' 11\")   Wt 112 kg (247 lb)   SpO2 98%   BMI 34.45 kg/m    Body mass index is 34.45 kg/m .  Physical Exam   GENERAL: healthy, alert and no distress  EYES: Eyes grossly normal to inspection, PERRL and conjunctivae and sclerae normal  HENT: ear canals and TM's normal, nose and mouth without ulcers or lesions  NECK: no adenopathy, no asymmetry, masses, or scars and thyroid normal to palpation  RESP: lungs clear to auscultation - no rales, rhonchi or wheezes  CV: regular rate and rhythm, normal S1 S2, no S3 or S4, no murmur, click or rub, no peripheral edema and peripheral pulses strong  ABDOMEN: soft, nontender, no hepatosplenomegaly, no masses and bowel sounds normal  MS: no gross musculoskeletal defects noted, no edema  SKIN: no suspicious lesions or rashes  NEURO: Normal strength and tone, mentation intact and speech normal  BACK: no CVA tenderness, no paralumbar tenderness  PSYCH: mentation appears normal, affect normal/bright  LYMPH: no cervical, supraclavicular, axillary, or inguinal adenopathy    Diagnostic Test Results:  Labs reviewed in Epic  Results for orders placed or performed in visit on 09/27/19   Hemoglobin   Result Value Ref Range    Hemoglobin 16.1 13.3 - 17.7 g/dL   Protein  random urine with Creat Ratio   Result Value Ref Range    Protein Random Urine 0.16 g/L    Protein Total Urine g/gr Creatinine 0.16 0 - 0.2 g/g Cr   Parathyroid Hormone Intact   Result Value Ref Range    Parathyroid Hormone Intact 69 18 - 80 pg/mL   Comprehensive metabolic panel   Result Value Ref Range    Sodium 140 133 - 144 mmol/L    Potassium 4.2 3.4 - 5.3 mmol/L    Chloride 111 (H) 94 - 109 mmol/L    Carbon Dioxide 21 20 - 32 mmol/L    Anion Gap 8 3 - 14 mmol/L    Glucose 133 (H) 70 - 99 mg/dL    Urea Nitrogen 44 (H) 7 - 30 mg/dL    Creatinine 2.29 (H) 0.66 - 1.25 mg/dL    GFR Estimate 35 (L) >60 mL/min/[1.73_m2]    GFR " "Estimate If Black 41 (L) >60 mL/min/[1.73_m2]    Calcium 9.2 8.5 - 10.1 mg/dL    Bilirubin Total 0.6 0.2 - 1.3 mg/dL    Albumin 3.9 3.4 - 5.0 g/dL    Protein Total 7.7 6.8 - 8.8 g/dL    Alkaline Phosphatase 88 40 - 150 U/L    ALT 84 (H) 0 - 70 U/L    AST 34 0 - 45 U/L   Phosphorus   Result Value Ref Range    Phosphorus 2.8 2.5 - 4.5 mg/dL   Creatinine urine calculation only   Result Value Ref Range    Creatinine Urine 98 mg/dL           Assessment & Plan       ICD-10-CM    1. Need for prophylactic vaccination and inoculation against influenza Z23 INFLUENZA VACCINE IM > 6 MONTHS VALENT IIV4 [98739]     Vaccine Administration, Initial [45036]   2. QURESHI (nonalcoholic steatohepatitis) K75.81    3. Hypophosphatemia E83.39    4. Secondary renal hyperparathyroidism (H) N25.81    5. CKD (chronic kidney disease) stage 3, GFR 30-59 ml/min (H) N18.3         BMI:   Estimated body mass index is 34.45 kg/m  as calculated from the following:    Height as of this encounter: 1.803 m (5' 11\").    Weight as of this encounter: 112 kg (247 lb).   Weight management plan: Discussed healthy diet and exercise guidelines    Weight dropped significantly with topamax but developed hypophosphatemia as a side effect  Will conside victoza as next option as long as OK with nephrology          Work on weight loss  Regular exercise    No follow-ups on file.    Roberth Tavares MD  [facility]        "

## 2019-09-30 NOTE — NURSING NOTE
Prior to injection, verified patient identity using patient's name and date of birth. Due to injection administration, patient instructed to remain in clinic for 15 minutes afterwards, and to report any adverse reaction to me immediately.  Mary Jane Solis MA

## 2019-10-07 NOTE — PROGRESS NOTES
Sept 16, 2019  CHIEF COMPLAINT: CKD     HISTORY OF PRESENT ILLNESS:  Abelino Gracia is a 34 year-old male who has had elevated creatinine levels dating back to 2006 according to [facility] records.  His creatinine has ranged anywhere from 1.3-1.7 in his [facility] records.  He was seen by nephrology on one occasion while inpatient at [facility] in 2009 and his kidney injury at that time was felt to be related to long-standing heavy use of NSAIDs.  To briefly summarize risk factors for CKD: heavy NSAID use in the past, previous etoh abuse (sober since 09). Addt hx includes DESHAUN. His serologic workup including C3, C4, ANCA, Anti-GBM, and immunofixations were normal. He is felt to have QURESHI as the cause of his liver abnormalities.    His previous baseline kidney function had been 1.8-2 but has been running slightly higher at 1.8-2.3 in the past year.  His creatinine is 2.08 at this time.  He has been on Topamax for weight loss however without weight loss success.  His phosphorus was noted to be 1.6 at this time and he does report some muscle related weakness.  He is taking 1000 units of vitamin D daily.       Past Medical History:   Diagnosis Date     Allergies     cats,dust, pollens     Depressive disorder 2017     Leukocytosis      Leukocytosis      Panic disorder without agoraphobia      Uncomplicated asthma 1990    Diagnosed as a child      Past Surgical History:   Procedure Laterality Date     APPENDECTOMY  08/25/2004     C NONSPECIFIC PROCEDURE   Feb 2011     Laparoscopic cholecystectomy.     CHOLECYSTECTOMY       COLONOSCOPY N/A 11/14/2016    Procedure: COLONOSCOPY;  Surgeon: Mauro Tan MD;  Location:  GI     TONSILLECTOMY  06/08/2006          SOCIAL HISTORY:  +tobacco  He started smoking between ages [age]. Previous heavy alcohol abuse for which he went through treatment, he has been sober since 2009.  No drug use.  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  He does not have children.      REVIEW OF  SYSTEMS: 4 point ROS obtained, all pertinent positives and negatives in the HPI otherwise all other systems negative.      PHYSICAL EXAMINATION:   Blood pressure 118/83, pulse 91, weight 114.3 kg (252 lb), SpO2 97 %.   GENERAL:  The patient is alert and oriented x3, in no acute distress, appears comfortable, pleasant to speak clearly.   HEART:  Regular rate and rhythm, no murmurs, rubs or gallops.   LUNGS:  Clear to auscultation and no rhonchi, or rales.   EXTREMITIES:  There is no lower extremity pitting edema.   SKIN:  There are no rashes appreciated.   PSYCHIATRIC:  Mood and affect are within normal limits.   Office Visit on 09/16/2019   Component Date Value Ref Range Status     25 OH Vit D2 09/16/2019 <5  ug/L Final     25 OH Vit D3 09/16/2019 44  ug/L Final     25 OH Vit D total 09/16/2019 <49  20 - 75 ug/L Final    Comment: Season, race, dietary intake, and treatment affect the concentration of   25-hydroxy-Vitamin D. Values may decrease during winter months and increase   during summer months. Values 20-29 ug/L may indicate Vitamin D insufficiency   and values <20 ug/L may indicate Vitamin D deficiency.  This test was developed and its performance characteristics determined by the   [facility],  Special Chemistry Laboratory. It has   not been cleared or approved by the FDA. The laboratory is regulated under   CLIA as qualified to perform high-complexity testing. This test is used for   clinical purposes. It should not be regarded as investigational or for   research.     Orders Only on 09/16/2019   Component Date Value Ref Range Status     Sodium 09/16/2019 139  133 - 144 mmol/L Final     Potassium 09/16/2019 4.3  3.4 - 5.3 mmol/L Final     Chloride 09/16/2019 111* 94 - 109 mmol/L Final     Carbon Dioxide 09/16/2019 22  20 - 32 mmol/L Final     Anion Gap 09/16/2019 6  3 - 14 mmol/L Final     Glucose 09/16/2019 208* 70 - 99 mg/dL Final     Urea Nitrogen 09/16/2019 32* 7 - 30 mg/dL  Final     Creatinine 09/16/2019 2.08* 0.66 - 1.25 mg/dL Final     GFR Estimate 09/16/2019 40* >60 mL/min/[1.73_m2] Final    Comment: Non  GFR Calc  Starting 12/18/2018, serum creatinine based estimated GFR (eGFR) will be   calculated using the Chronic Kidney Disease Epidemiology Collaboration   (CKD-EPI) equation.       GFR Estimate If Black 09/16/2019 46* >60 mL/min/[1.73_m2] Final    Comment:  GFR Calc  Starting 12/18/2018, serum creatinine based estimated GFR (eGFR) will be   calculated using the Chronic Kidney Disease Epidemiology Collaboration   (CKD-EPI) equation.       Calcium 09/16/2019 9.0  8.5 - 10.1 mg/dL Final     Phosphorus 09/16/2019 1.6* 2.5 - 4.5 mg/dL Final     Albumin 09/16/2019 3.5  3.4 - 5.0 g/dL Final     Protein Random Urine 09/16/2019 0.15  g/L Final     Protein Total Urine g/gr Creatinine 09/16/2019 0.29* 0 - 0.2 g/g Cr Final     Parathyroid Hormone Intact 09/16/2019 44  18 - 80 pg/mL Final     Hemoglobin 09/16/2019 15.6  13.3 - 17.7 g/dL Final     Creatinine Urine 09/16/2019 51  mg/dL Final        ASSESSMENT AND PLAN:   1.  CKD Stage 3:  His biggest risk factor for kidney disease is related to long-standing NSAID use.  He has had a very low level of proteinuria with minimal albuminuria - suggests potentially a tubular injury. NSAIDs would fit with this finding. He is now away from NSAIDs. Creatinine was higher recently which may be somehow related to his change in diet potentially. Offered kidney biopsy but he would like to hold off on this for now. I explained that I would have a low threshold should we see a rise in creatinine or increase in proteinuria.     2.  QURESHI: Encouraged ongoing weight loss not only to help his kidney function/proteinuria but also avoid addt liver damage related to fatty liver disease.  Also encouraged avoidance of etoh. He has been seen by hepatology.     3. Prediabetes - follwoed by PCP    4. Hypophosphatemia noted to be  hypophosphatemic at this time.:  Topamax causes abnormalities in proximal tubular function which can lead to hypophosphatemia.  If he should continue on Topamax therapy I would encourage him to be on phosphorus replacement.  Following the visit he chose to stop Topamax and is updated his primary care regarding this finding.  His vitamin D level is 49 and therefore does not seem to be the cause of the hypophosphatemia.      Patient Instructions   1. Start phos replacement  2. Labs again mid to later next week (comprehensive panel)  3. I'll update you on the vitamin D dose that I send in   4. Labs every 2 months (renal panel)  5. Follow-up in 6 months (all the labs)          JUAN LUIS FAUSTIN MD

## 2019-10-10 DIAGNOSIS — N18.30 CKD (CHRONIC KIDNEY DISEASE) STAGE 3, GFR 30-59 ML/MIN (H): Chronic | ICD-10-CM

## 2019-10-10 LAB
ALBUMIN SERPL-MCNC: 3.8 G/DL (ref 3.4–5)
ANION GAP SERPL CALCULATED.3IONS-SCNC: 8 MMOL/L (ref 3–14)
BUN SERPL-MCNC: 37 MG/DL (ref 7–30)
CALCIUM SERPL-MCNC: 9.4 MG/DL (ref 8.5–10.1)
CHLORIDE SERPL-SCNC: 109 MMOL/L (ref 94–109)
CO2 SERPL-SCNC: 22 MMOL/L (ref 20–32)
CREAT SERPL-MCNC: 1.77 MG/DL (ref 0.66–1.25)
GFR SERPL CREATININE-BSD FRML MDRD: 48 ML/MIN/{1.73_M2}
GLUCOSE SERPL-MCNC: 129 MG/DL (ref 70–99)
PHOSPHATE SERPL-MCNC: 2.4 MG/DL (ref 2.5–4.5)
POTASSIUM SERPL-SCNC: 4.3 MMOL/L (ref 3.4–5.3)
SODIUM SERPL-SCNC: 139 MMOL/L (ref 133–144)

## 2019-10-10 PROCEDURE — 80069 RENAL FUNCTION PANEL: CPT | Performed by: INTERNAL MEDICINE

## 2019-10-10 PROCEDURE — 36415 COLL VENOUS BLD VENIPUNCTURE: CPT | Performed by: INTERNAL MEDICINE

## 2019-11-09 ENCOUNTER — HEALTH MAINTENANCE LETTER (OUTPATIENT)
Age: 34
End: 2019-11-09

## 2019-11-21 DIAGNOSIS — N18.30 CKD (CHRONIC KIDNEY DISEASE) STAGE 3, GFR 30-59 ML/MIN (H): ICD-10-CM

## 2019-11-21 LAB
ALBUMIN SERPL-MCNC: 3.7 G/DL (ref 3.4–5)
ANION GAP SERPL CALCULATED.3IONS-SCNC: 4 MMOL/L (ref 3–14)
BUN SERPL-MCNC: 38 MG/DL (ref 7–30)
CALCIUM SERPL-MCNC: 9.2 MG/DL (ref 8.5–10.1)
CHLORIDE SERPL-SCNC: 111 MMOL/L (ref 94–109)
CO2 SERPL-SCNC: 24 MMOL/L (ref 20–32)
CREAT SERPL-MCNC: 2.03 MG/DL (ref 0.66–1.25)
CREAT UR-MCNC: 93 MG/DL
GFR SERPL CREATININE-BSD FRML MDRD: 41 ML/MIN/{1.73_M2}
GLUCOSE SERPL-MCNC: 130 MG/DL (ref 70–99)
HGB BLD-MCNC: 15.3 G/DL (ref 13.3–17.7)
PHOSPHATE SERPL-MCNC: 2.6 MG/DL (ref 2.5–4.5)
POTASSIUM SERPL-SCNC: 4.4 MMOL/L (ref 3.4–5.3)
PROT UR-MCNC: 0.23 G/L
PROT/CREAT 24H UR: 0.24 G/G CR (ref 0–0.2)
PTH-INTACT SERPL-MCNC: 107 PG/ML (ref 18–80)
SODIUM SERPL-SCNC: 139 MMOL/L (ref 133–144)

## 2019-11-21 PROCEDURE — 36415 COLL VENOUS BLD VENIPUNCTURE: CPT | Performed by: INTERNAL MEDICINE

## 2019-11-21 PROCEDURE — 85018 HEMOGLOBIN: CPT | Performed by: INTERNAL MEDICINE

## 2019-11-21 PROCEDURE — 83970 ASSAY OF PARATHORMONE: CPT | Performed by: INTERNAL MEDICINE

## 2019-11-21 PROCEDURE — 80069 RENAL FUNCTION PANEL: CPT | Performed by: INTERNAL MEDICINE

## 2019-11-21 PROCEDURE — 84156 ASSAY OF PROTEIN URINE: CPT | Performed by: INTERNAL MEDICINE

## 2019-12-16 ENCOUNTER — OFFICE VISIT (OUTPATIENT)
Dept: FAMILY MEDICINE | Facility: CLINIC | Age: 34
End: 2019-12-16
Payer: COMMERCIAL

## 2019-12-16 VITALS
WEIGHT: 233.2 LBS | SYSTOLIC BLOOD PRESSURE: 128 MMHG | TEMPERATURE: 98.4 F | HEART RATE: 93 BPM | OXYGEN SATURATION: 98 % | BODY MASS INDEX: 32.52 KG/M2 | DIASTOLIC BLOOD PRESSURE: 86 MMHG

## 2019-12-16 DIAGNOSIS — Z11.4 SCREENING FOR HIV WITHOUT PRESENCE OF RISK FACTORS: ICD-10-CM

## 2019-12-16 DIAGNOSIS — N18.30 CKD (CHRONIC KIDNEY DISEASE) STAGE 3, GFR 30-59 ML/MIN (H): Primary | Chronic | ICD-10-CM

## 2019-12-16 DIAGNOSIS — E11.9 TYPE 2 DIABETES, HBA1C GOAL < 7% (H): ICD-10-CM

## 2019-12-16 LAB
ANION GAP SERPL CALCULATED.3IONS-SCNC: 8 MMOL/L (ref 3–14)
BUN SERPL-MCNC: 33 MG/DL (ref 7–30)
CALCIUM SERPL-MCNC: 9.5 MG/DL (ref 8.5–10.1)
CHLORIDE SERPL-SCNC: 110 MMOL/L (ref 94–109)
CO2 SERPL-SCNC: 23 MMOL/L (ref 20–32)
CREAT SERPL-MCNC: 2.11 MG/DL (ref 0.66–1.25)
GFR SERPL CREATININE-BSD FRML MDRD: 39 ML/MIN/{1.73_M2}
GLUCOSE SERPL-MCNC: 124 MG/DL (ref 70–99)
HBA1C MFR BLD: 6 % (ref 0–5.6)
POTASSIUM SERPL-SCNC: 4.1 MMOL/L (ref 3.4–5.3)
SODIUM SERPL-SCNC: 141 MMOL/L (ref 133–144)

## 2019-12-16 PROCEDURE — 99214 OFFICE O/P EST MOD 30 MIN: CPT | Performed by: INTERNAL MEDICINE

## 2019-12-16 PROCEDURE — 82306 VITAMIN D 25 HYDROXY: CPT | Performed by: INTERNAL MEDICINE

## 2019-12-16 PROCEDURE — 83036 HEMOGLOBIN GLYCOSYLATED A1C: CPT | Performed by: INTERNAL MEDICINE

## 2019-12-16 PROCEDURE — 87389 HIV-1 AG W/HIV-1&-2 AB AG IA: CPT | Performed by: INTERNAL MEDICINE

## 2019-12-16 PROCEDURE — 36415 COLL VENOUS BLD VENIPUNCTURE: CPT | Performed by: INTERNAL MEDICINE

## 2019-12-16 PROCEDURE — 80048 BASIC METABOLIC PNL TOTAL CA: CPT | Performed by: INTERNAL MEDICINE

## 2019-12-16 ASSESSMENT — PAIN SCALES - GENERAL: PAINLEVEL: NO PAIN (0)

## 2019-12-16 NOTE — PROGRESS NOTES
Subjective     Abelino Gracia is a 34 year old male who presents to clinic today for the following health issues:    HPI   Diabetes Follow-up    How often are you checking your blood sugar? A few times a month  What time of day are you checking your blood sugars (select all that apply)?  it varies  Have you had any blood sugars above 200?  Yes During nephrologist appointment a few months ago   Have you had any blood sugars below 70?  No    What symptoms do you notice when your blood sugar is low?  None and Not applicable    What concerns do you have today about your diabetes? None and Other: wanting to know if it improved      Do you have any of these symptoms? (Select all that apply)  Weight loss- Intentional      Have you had a diabetic eye exam in the last 12 months? No    BP Readings from Last 2 Encounters:   09/30/19 121/88   09/16/19 118/83     Hemoglobin A1C (%)   Date Value   07/30/2019 6.4 (H)   08/28/2018 6.4 (H)     LDL Cholesterol Calculated (mg/dL)   Date Value   07/30/2019 86   02/08/2018 91       Diabetes Management Resources      How many servings of fruits and vegetables do you eat daily?  2-3    On average, how many sweetened beverages do you drink each day (Examples: soda, juice, sweet tea, etc.  Do NOT count diet or artificially sweetened beverages)?   0    How many days per week do you miss taking your medication? 0      Wanting to fill paperwork for appeal form for work (for weight)            Patient Active Problem List   Diagnosis     Major depressive disorder     Insomnia     Migraine headache     Asthma, mild intermittent     DESHAUN (obstructive sleep apnea)- mild (AHI 5)     CARDIOVASCULAR SCREENING; LDL GOAL LESS THAN 160     CKD (chronic kidney disease) stage 3, GFR 30-59 ml/min (H)     Leukocytosis     QURESHI (nonalcoholic steatohepatitis)     BMI 31.0-31.9,adult     Hypophosphatemia     Alcohol dependence in remission (H)     Generalized anxiety disorder     Morbid obesity (H)      Secondary renal hyperparathyroidism (H)     Past Surgical History:   Procedure Laterality Date     APPENDECTOMY  2004     C NONSPECIFIC PROCEDURE   2011     Laparoscopic cholecystectomy.     CHOLECYSTECTOMY       COLONOSCOPY N/A 2016    Procedure: COLONOSCOPY;  Surgeon: Mauro Tan MD;  Location:  GI     TONSILLECTOMY  2006       Social History     Tobacco Use     Smoking status: Former Smoker     Packs/day: 1.00     Years: 20.00     Pack years: 20.00     Types: Cigarettes     Start date: 1998     Last attempt to quit: 3/5/2017     Years since quittin.7     Smokeless tobacco: Never Used     Tobacco comment: Have tried chantix   Substance Use Topics     Alcohol use: No     Alcohol/week: 0.0 standard drinks     Comment: SOBER SINCE 2009     Family History   Problem Relation Age of Onset     Allergies Mother      Allergies Brother      Heart Disease Maternal Grandfather      Hypertension Maternal Grandfather      Other Cancer Maternal Grandfather      Asthma Other      Cancer - colorectal Maternal Grandmother 80     Cancer Maternal Grandmother 40        uterine cancer     Kidney Disease Maternal Grandmother          of kidney failure - ~ age 80     Diabetes Maternal Grandmother      Colon Cancer Maternal Grandmother      Other Cancer Maternal Grandmother          Current Outpatient Medications   Medication Sig Dispense Refill     acetaminophen (TYLENOL) 325 MG tablet Take 325 mg by mouth as needed        albuterol (PROAIR HFA) 108 (90 Base) MCG/ACT inhaler Inhale 2 puffs into the lungs every 4 hours as needed 2 Inhaler 3     blood glucose monitoring (SARA CONTOUR MONITOR) meter device kit Use to test blood sugars one time daily. Strips Ex: 2019, Lot: [id number], SN: [id number] 1 kit 0     blood glucose monitoring (SARA CONTOUR) test strip Use to test blood sugars one time daily 50 strip 11     blood glucose monitoring (SARA MICROLET) lancets Use to test blood  sugar 1 times daily 100 each 11     buPROPion (WELLBUTRIN XL) 300 MG 24 hr tablet Take 1 tablet (300 mg) by mouth daily 90 tablet 3     cetirizine (ZYRTEC) 10 MG tablet Take 10 mg by mouth daily as needed        cholecalciferol (VITAMIN D3) 1000 UNIT tablet Take 1 tablet (1,000 Units) by mouth daily Start after Ergocalciferol therapy complete. 92 tablet 3     Fexofenadine HCl (ALLEGRA PO) Take 60 mg by mouth daily as needed        fluticasone (FLONASE) 50 MCG/ACT nasal spray Spray 1-2 sprays into both nostrils daily as needed  1 Package 11     losartan (COZAAR) 25 MG tablet Take 0.5 tablets (12.5 mg) by mouth daily 45 tablet 3     melatonin 3 MG tablet Take 6 mg by mouth nightly as needed for sleep        propranolol ER (INDERAL LA) 60 MG 24 hr capsule Take 1 capsule (60 mg) by mouth daily 90 capsule 3     sitagliptin (JANUVIA) 25 MG tablet Take 1 tablet (25 mg) by mouth daily 90 tablet 3     albuterol (PROVENTIL) (2.5 MG/3ML) 0.083% neb solution Take 1 vial (2.5 mg) by nebulization every 4 hours as needed for shortness of breath / dyspnea 300 mL 3     potassium phosphate, monobasic, (K-PHOS) 500 MG tablet Take 1 tablet (500 mg) by mouth 2 times daily (Patient not taking: Reported on 12/16/2019) 180 tablet 0     Allergies   Allergen Reactions     Clarithromycin Hives     Penicillins      Sulfa Drugs      Topamax [Topiramate]      Decreased gfr and decrease phosphorous with use     Adhesive Tape Rash     Recent Labs   Lab Test 12/16/19  0911 11/21/19  0657  09/27/19  0710  07/30/19  0903  08/28/18  0736 08/28/18  0735  02/08/18  0916  03/07/16  0810  08/24/15  0728   A1C 6.0*  --   --   --   --  6.4*  --   --  6.4*  --   --   --   --   --   --    LDL  --   --   --   --   --  86  --   --   --   --  91  --  103*  --   --    HDL  --   --   --   --   --  46  --   --   --   --  40  --  35*  --   --    TRIG  --   --   --   --   --  114  --   --   --   --  140  --  122  --   --    ALT  --   --   --  84*  --   --   --  128*  126*  --  107*   < >  --    < >  --    CR 2.11* 2.03*   < > 2.29*   < > 2.01*   < > 1.92* 1.90*   < > 1.92*   < >  --    < > 1.46*   GFRESTIMATED 39* 41*   < > 35*   < > 41*   < > 40* 40*   < > 40*   < >  --    < > 56*   GFRESTBLACK 45* 47*   < > 41*   < > 48*   < > 48* 49*   < > 48*   < >  --    < > 67   POTASSIUM 4.1 4.4   < > 4.2   < > 4.5   < > 4.3 4.2   < > 4.3   < >  --    < > 4.6   TSH  --   --   --   --   --  0.76  --   --   --   --   --   --   --   --  1.00    < > = values in this interval not displayed.      BP Readings from Last 3 Encounters:   12/16/19 128/86   09/30/19 121/88   09/16/19 118/83    Wt Readings from Last 3 Encounters:   12/16/19 105.8 kg (233 lb 3.2 oz)   09/30/19 112 kg (247 lb)   09/16/19 114.3 kg (252 lb)                    Reviewed and updated as needed this visit by Provider         Review of Systems   ROS COMP: Constitutional, HEENT, cardiovascular, pulmonary, gi and gu systems are negative, except as otherwise noted.      Objective    There were no vitals taken for this visit.  There is no height or weight on file to calculate BMI.  Physical Exam   GENERAL: healthy, alert and no distress  EYES: Eyes grossly normal to inspection, PERRL and conjunctivae and sclerae normal  HENT: ear canals and TM's normal, nose and mouth without ulcers or lesions  NECK: no adenopathy, no asymmetry, masses, or scars and thyroid normal to palpation  RESP: lungs clear to auscultation - no rales, rhonchi or wheezes  CV: regular rate and rhythm, normal S1 S2, no S3 or S4, no murmur, click or rub, no peripheral edema and peripheral pulses strong  ABDOMEN: soft, nontender, no hepatosplenomegaly, no masses and bowel sounds normal  MS: no gross musculoskeletal defects noted, no edema  SKIN: no suspicious lesions or rashes  NEURO: Normal strength and tone, mentation intact and speech normal  BACK: no CVA tenderness, no paralumbar tenderness  PSYCH: mentation appears normal, affect normal/bright  LYMPH: no  cervical, supraclavicular, axillary, or inguinal adenopathy    Diagnostic Test Results:  Labs reviewed in Epic  Results for orders placed or performed in visit on 12/16/19   Hemoglobin A1c     Status: Abnormal   Result Value Ref Range    Hemoglobin A1C 6.0 (H) 0 - 5.6 %   Basic metabolic panel     Status: Abnormal   Result Value Ref Range    Sodium 141 133 - 144 mmol/L    Potassium 4.1 3.4 - 5.3 mmol/L    Chloride 110 (H) 94 - 109 mmol/L    Carbon Dioxide 23 20 - 32 mmol/L    Anion Gap 8 3 - 14 mmol/L    Glucose 124 (H) 70 - 99 mg/dL    Urea Nitrogen 33 (H) 7 - 30 mg/dL    Creatinine 2.11 (H) 0.66 - 1.25 mg/dL    GFR Estimate 39 (L) >60 mL/min/[1.73_m2]    GFR Estimate If Black 45 (L) >60 mL/min/[1.73_m2]    Calcium 9.5 8.5 - 10.1 mg/dL           Assessment & Plan       ICD-10-CM    1. CKD (chronic kidney disease) stage 3, GFR 30-59 ml/min (H) N18.3 Vitamin D Deficiency     Basic metabolic panel   2. Type 2 diabetes, HbA1c goal < 7% (H) E11.9 Hemoglobin A1c   3. Screening for HIV without presence of risk factors Z11.4 HIV Antigen Antibody Combo      BP     128/86  12/17/2019    Lab Results   Component Value Date     12/16/2019     Lab Results   Component Value Date    A1C 6.0 12/16/2019     Lab Results   Component Value Date    LDL 86 07/30/2019     Lab Results   Component Value Date    MICROL 12 02/25/2019     No results found for: MICROALBUMIN      Regular exercise    No follow-ups on file.    Roberth Tavares MD  [facility]

## 2019-12-17 LAB
DEPRECATED CALCIDIOL+CALCIFEROL SERPL-MC: 36 UG/L (ref 20–75)
HIV 1+2 AB+HIV1 P24 AG SERPL QL IA: NONREACTIVE

## 2020-03-13 DIAGNOSIS — N18.30 CKD (CHRONIC KIDNEY DISEASE) STAGE 3, GFR 30-59 ML/MIN (H): Primary | ICD-10-CM

## 2020-03-17 ENCOUNTER — TELEPHONE (OUTPATIENT)
Dept: NEPHROLOGY | Facility: CLINIC | Age: 35
End: 2020-03-17

## 2020-03-17 NOTE — TELEPHONE ENCOUNTER
Called and spoke with pt.  The situation surrounding COVID-19 (novel coronavirus) continues to evolve and changes occur rapidly. As a precautionary measure and to keep patients and team members safe, we are limiting non-essential visits to the clinic. We are giving patients the option to switch their in-clinic appointments to telephone visits.       If you would like to proceed with a telephone visit.  Pt agreed with plan.  Lab appt cancelled at this time.    Irene Ramirez LPN

## 2020-03-23 ENCOUNTER — VIRTUAL VISIT (OUTPATIENT)
Dept: NEPHROLOGY | Facility: CLINIC | Age: 35
End: 2020-03-23
Payer: COMMERCIAL

## 2020-03-23 DIAGNOSIS — K75.81 NASH (NONALCOHOLIC STEATOHEPATITIS): ICD-10-CM

## 2020-03-23 DIAGNOSIS — N18.30 CKD (CHRONIC KIDNEY DISEASE) STAGE 3, GFR 30-59 ML/MIN (H): Primary | ICD-10-CM

## 2020-03-23 PROCEDURE — 99213 OFFICE O/P EST LOW 20 MIN: CPT | Mod: TEL | Performed by: INTERNAL MEDICINE

## 2020-03-23 RX ORDER — DIPHENHYDRAMINE HCL 25 MG
50 TABLET ORAL
COMMUNITY
End: 2022-09-22

## 2020-03-23 NOTE — PROGRESS NOTES
"Abelino Gracia is a 35 year old male who is being evaluated via a billable telephone visit.      The patient has been notified of following:     \"This telephone visit will be conducted via a call between you and your physician/provider. We have found that certain health care needs can be provided without the need for a physical exam.  This service lets us provide the care you need with a short phone conversation.  If a prescription is necessary we can send it directly to your pharmacy.  If lab work is needed we can place an order for that and you can then stop by our lab to have the test done at a later time.    If during the course of the call the physician/provider feels a telephone visit is not appropriate, you will not be charged for this service.\"     Irene Ramirez LPN    Abelino Gracia complains of    Chief Complaint   Patient presents with     RECHECK     6mo recheck CKD       I have reviewed and updated the patient's Past Medical History, Social History, Family History and Medication List.    ALLERGIES  Clarithromycin; Penicillins; Sulfa drugs; Topamax [topiramate]; and Adhesive tape    03/23/20   CHIEF COMPLAINT: CKD     HISTORY OF PRESENT ILLNESS:  Abelino Gracia is a 35 year-old male who has had elevated creatinine levels dating back to 2006 according to [facility] records.      History taken from previous notes: His creatinine has ranged anywhere from 1.3-1.7 in his [facility] records.  He was seen by nephrology on one occasion while inpatient at [facility] in 2009 and his kidney injury at that time was felt to be related to long-standing heavy use of NSAIDs.  To briefly summarize risk factors for CKD: heavy NSAID use in the past, previous etoh abuse (sober since 09). Addt hx includes DESHAUN. His serologic workup including C3, C4, ANCA, Anti-GBM, and immunofixations were normal. He is felt to have QURESHI as the cause of his liver abnormalities.    His previous baseline kidney function had been 1.8-2 " but has been running slightly higher at 1.8-2.3 in the past year.  His creatinine is 2.08 at this time.  He has been on Topamax for weight loss however without weight loss success.  His phosphorus was noted to be 1.6 at this time and he does report some muscle related weakness.  He is taking 1000 units of vitamin D daily.    03/23/20: no home BP readings at this time (no cuff). Clinic Readings have been less than 130/80. Weight was 252 lbs down to 233 lbs in Dec. Has gained some of that back but still kept most off. No NSAIDs. No swelling concerns. No longer on topamax.        Past Medical History:   Diagnosis Date     Allergies     cats,dust, pollens     Depressive disorder 2017     Leukocytosis      Leukocytosis      Panic disorder without agoraphobia      Uncomplicated asthma 1990    Diagnosed as a child      Past Surgical History:   Procedure Laterality Date     APPENDECTOMY  08/25/2004     C NONSPECIFIC PROCEDURE   Feb 2011     Laparoscopic cholecystectomy.     CHOLECYSTECTOMY       COLONOSCOPY N/A 11/14/2016    Procedure: COLONOSCOPY;  Surgeon: Mauro Tan MD;  Location:  GI     TONSILLECTOMY  06/08/2006          SOCIAL HISTORY:  +tobacco  He started smoking between ages [age]. Previous heavy alcohol abuse for which he went through treatment, he has been sober since 2009.  No drug use.  He currently works for a health insurance company.  He does not have children.      REVIEW OF SYSTEMS: 4 point ROS obtained, all pertinent positives and negatives in the HPI otherwise all other systems negative.      PHYSICAL EXAMINATION:   There were no vitals taken for this visit.   No exam - telephone encounter.   No visits with results within 1 Day(s) from this visit.   Latest known visit with results is:   Office Visit on 12/16/2019   Component Date Value Ref Range Status     Hemoglobin A1C 12/16/2019 6.0* 0 - 5.6 % Final    Comment: Normal <5.7% Prediabetes 5.7-6.4%  Diabetes 6.5% or higher - adopted from ADA    consensus guidelines.       Vitamin D Deficiency screening 12/16/2019 36  20 - 75 ug/L Final    Comment: Season, race, dietary intake, and treatment affect the concentration of   25-hydroxy-Vitamin D. Values may decrease during winter months and increase   during summer months. Values 20-29 ug/L may indicate Vitamin D insufficiency   and values <20 ug/L may indicate Vitamin D deficiency.  Vitamin D determination is routinely performed by an immunoassay specific for   25 hydroxyvitamin D3.  If an individual is on vitamin D2 (ergocalciferol)   supplementation, please specify 25 OH vitamin D2 and D3 level determination by   LCMSMS test [id number].       Sodium 12/16/2019 141  133 - 144 mmol/L Final     Potassium 12/16/2019 4.1  3.4 - 5.3 mmol/L Final     Chloride 12/16/2019 110* 94 - 109 mmol/L Final     Carbon Dioxide 12/16/2019 23  20 - 32 mmol/L Final     Anion Gap 12/16/2019 8  3 - 14 mmol/L Final     Glucose 12/16/2019 124* 70 - 99 mg/dL Final    Fasting specimen     Urea Nitrogen 12/16/2019 33* 7 - 30 mg/dL Final     Creatinine 12/16/2019 2.11* 0.66 - 1.25 mg/dL Final     GFR Estimate 12/16/2019 39* >60 mL/min/[1.73_m2] Final    Comment: Non  GFR Calc  Starting 12/18/2018, serum creatinine based estimated GFR (eGFR) will be   calculated using the Chronic Kidney Disease Epidemiology Collaboration   (CKD-EPI) equation.       GFR Estimate If Black 12/16/2019 45* >60 mL/min/[1.73_m2] Final    Comment:  GFR Calc  Starting 12/18/2018, serum creatinine based estimated GFR (eGFR) will be   calculated using the Chronic Kidney Disease Epidemiology Collaboration   (CKD-EPI) equation.       Calcium 12/16/2019 9.5  8.5 - 10.1 mg/dL Final     HIV Antigen Antibody Combo 12/16/2019 Nonreactive  NR^Nonreactive     Final    HIV-1 p24 Ag & HIV-1/HIV-2 Ab Not Detected         ASSESSMENT AND PLAN:   1.  CKD Stage 3:  His biggest risk factor for kidney disease is related to long-standing NSAID use.  He  has had a very low level of proteinuria with minimal albuminuria - suggests potentially a tubular injury. NSAIDs would fit with this finding. He is now away from NSAIDs. Offered kidney biopsy but he would like to hold off on this for now. I explained that I would have a low threshold should we see a rise in creatinine or increase in proteinuria.    For him, with the unknowns of his kidney disease, I would typically lean toward getting his labs every 3 months. In the midst of the COVID-19 pandemic, I have encouraged him to avoid coming to the clinic and instead plan to get labs checked in 1-2 months pending the COVID-19 circumstance. I have lab orders placed in his chart. Will plan 6 month follow-up.     2.  QURESHI: Encouraged ongoing weight loss not only to help his kidney function/proteinuria but also avoid addt liver damage related to fatty liver disease.  Also encouraged avoidance of etoh. He has been seen by hepatology.     3. Prediabetes - followed by PCP    4. Hypophosphatemia: resolved with stopping topamax.    5. Obesity: congratulated him on his weight loss success.     6. Secondary hyperparathyroidism, renal: PTH was 107 in Nov. Vitamin D level was at goal in Dec. Will monitor going forward.     Patient Instructions   Follow-up in 6 months          AMINA MENA MD        Phone call duration:9 minutes    Amina Mena MD

## 2020-04-08 ENCOUNTER — DOCUMENTATION ONLY (OUTPATIENT)
Dept: LAB | Facility: CLINIC | Age: 35
End: 2020-04-08

## 2020-05-26 DIAGNOSIS — N18.30 CKD (CHRONIC KIDNEY DISEASE) STAGE 3, GFR 30-59 ML/MIN (H): ICD-10-CM

## 2020-05-26 LAB
ALBUMIN SERPL-MCNC: 3.7 G/DL (ref 3.4–5)
ANION GAP SERPL CALCULATED.3IONS-SCNC: 10 MMOL/L (ref 3–14)
BUN SERPL-MCNC: 40 MG/DL (ref 7–30)
CALCIUM SERPL-MCNC: 8.7 MG/DL (ref 8.5–10.1)
CHLORIDE SERPL-SCNC: 111 MMOL/L (ref 94–109)
CO2 SERPL-SCNC: 17 MMOL/L (ref 20–32)
CREAT SERPL-MCNC: 2.24 MG/DL (ref 0.66–1.25)
CREAT UR-MCNC: 38 MG/DL
GFR SERPL CREATININE-BSD FRML MDRD: 36 ML/MIN/{1.73_M2}
GLUCOSE SERPL-MCNC: 114 MG/DL (ref 70–99)
HGB BLD-MCNC: 15 G/DL (ref 13.3–17.7)
PHOSPHATE SERPL-MCNC: 3.4 MG/DL (ref 2.5–4.5)
POTASSIUM SERPL-SCNC: 4 MMOL/L (ref 3.4–5.3)
PROT UR-MCNC: 0.15 G/L
PROT/CREAT 24H UR: 0.4 G/G CR (ref 0–0.2)
PTH-INTACT SERPL-MCNC: 94 PG/ML (ref 18–80)
SODIUM SERPL-SCNC: 138 MMOL/L (ref 133–144)

## 2020-05-26 PROCEDURE — 80069 RENAL FUNCTION PANEL: CPT | Performed by: INTERNAL MEDICINE

## 2020-05-26 PROCEDURE — 84156 ASSAY OF PROTEIN URINE: CPT | Performed by: INTERNAL MEDICINE

## 2020-05-26 PROCEDURE — 36415 COLL VENOUS BLD VENIPUNCTURE: CPT | Performed by: INTERNAL MEDICINE

## 2020-05-26 PROCEDURE — 83970 ASSAY OF PARATHORMONE: CPT | Performed by: INTERNAL MEDICINE

## 2020-05-26 PROCEDURE — 85018 HEMOGLOBIN: CPT | Performed by: INTERNAL MEDICINE

## 2020-06-12 ENCOUNTER — VIRTUAL VISIT (OUTPATIENT)
Dept: FAMILY MEDICINE | Facility: CLINIC | Age: 35
End: 2020-06-12
Payer: COMMERCIAL

## 2020-06-12 ENCOUNTER — MYC MEDICAL ADVICE (OUTPATIENT)
Dept: NEPHROLOGY | Facility: CLINIC | Age: 35
End: 2020-06-12

## 2020-06-12 DIAGNOSIS — F41.9 MODERATE ANXIETY: ICD-10-CM

## 2020-06-12 DIAGNOSIS — N18.30 CKD (CHRONIC KIDNEY DISEASE) STAGE 3, GFR 30-59 ML/MIN (H): Primary | ICD-10-CM

## 2020-06-12 DIAGNOSIS — G43.109 MIGRAINE WITH AURA AND WITHOUT STATUS MIGRAINOSUS, NOT INTRACTABLE: ICD-10-CM

## 2020-06-12 DIAGNOSIS — E11.9 TYPE 2 DIABETES, HBA1C GOAL < 8% (H): Primary | ICD-10-CM

## 2020-06-12 DIAGNOSIS — N18.30 CKD (CHRONIC KIDNEY DISEASE) STAGE 3, GFR 30-59 ML/MIN (H): ICD-10-CM

## 2020-06-12 LAB
ALBUMIN SERPL-MCNC: 3.6 G/DL (ref 3.4–5)
ALBUMIN UR-MCNC: NEGATIVE MG/DL
ANION GAP SERPL CALCULATED.3IONS-SCNC: 6 MMOL/L (ref 3–14)
APPEARANCE UR: CLEAR
BILIRUB UR QL STRIP: NEGATIVE
BUN SERPL-MCNC: 26 MG/DL (ref 7–30)
CALCIUM SERPL-MCNC: 9.6 MG/DL (ref 8.5–10.1)
CHLORIDE SERPL-SCNC: 107 MMOL/L (ref 94–109)
CO2 SERPL-SCNC: 26 MMOL/L (ref 20–32)
COLOR UR AUTO: YELLOW
CREAT SERPL-MCNC: 2.09 MG/DL (ref 0.66–1.25)
GFR SERPL CREATININE-BSD FRML MDRD: 39 ML/MIN/{1.73_M2}
GLUCOSE SERPL-MCNC: 103 MG/DL (ref 70–99)
GLUCOSE UR STRIP-MCNC: NEGATIVE MG/DL
HGB UR QL STRIP: NEGATIVE
KETONES UR STRIP-MCNC: NEGATIVE MG/DL
LEUKOCYTE ESTERASE UR QL STRIP: NEGATIVE
NITRATE UR QL: NEGATIVE
PH UR STRIP: 6.5 PH (ref 5–7)
PHOSPHATE SERPL-MCNC: 3.6 MG/DL (ref 2.5–4.5)
POTASSIUM SERPL-SCNC: 4.3 MMOL/L (ref 3.4–5.3)
SODIUM SERPL-SCNC: 139 MMOL/L (ref 133–144)
SOURCE: NORMAL
SP GR UR STRIP: <=1.005 (ref 1–1.03)
UROBILINOGEN UR STRIP-ACNC: 0.2 EU/DL (ref 0.2–1)

## 2020-06-12 PROCEDURE — 80069 RENAL FUNCTION PANEL: CPT | Performed by: INTERNAL MEDICINE

## 2020-06-12 PROCEDURE — 99213 OFFICE O/P EST LOW 20 MIN: CPT | Mod: 95 | Performed by: INTERNAL MEDICINE

## 2020-06-12 PROCEDURE — 81003 URINALYSIS AUTO W/O SCOPE: CPT | Performed by: INTERNAL MEDICINE

## 2020-06-12 PROCEDURE — 36415 COLL VENOUS BLD VENIPUNCTURE: CPT | Performed by: INTERNAL MEDICINE

## 2020-06-12 RX ORDER — PROPRANOLOL HCL 60 MG
60 CAPSULE, EXTENDED RELEASE 24HR ORAL DAILY
Qty: 90 CAPSULE | Refills: 3 | Status: SHIPPED | OUTPATIENT
Start: 2020-06-12 | End: 2021-08-09

## 2020-06-12 RX ORDER — BUPROPION HYDROCHLORIDE 300 MG/1
300 TABLET ORAL DAILY
Qty: 90 TABLET | Refills: 3 | Status: SHIPPED | OUTPATIENT
Start: 2020-06-12 | End: 2021-07-26

## 2020-06-12 RX ORDER — LOSARTAN POTASSIUM 25 MG/1
12.5 TABLET ORAL DAILY
Qty: 45 TABLET | Refills: 3 | Status: SHIPPED | OUTPATIENT
Start: 2020-06-12 | End: 2020-10-22

## 2020-06-12 RX ORDER — LIRAGLUTIDE 6 MG/ML
0.6 INJECTION SUBCUTANEOUS DAILY
Qty: 3 ML | Refills: 3 | Status: SHIPPED | OUTPATIENT
Start: 2020-06-12 | End: 2020-06-29

## 2020-06-12 ASSESSMENT — PATIENT HEALTH QUESTIONNAIRE - PHQ9
5. POOR APPETITE OR OVEREATING: NOT AT ALL
SUM OF ALL RESPONSES TO PHQ QUESTIONS 1-9: 5

## 2020-06-12 ASSESSMENT — ANXIETY QUESTIONNAIRES
6. BECOMING EASILY ANNOYED OR IRRITABLE: NOT AT ALL
5. BEING SO RESTLESS THAT IT IS HARD TO SIT STILL: NOT AT ALL
2. NOT BEING ABLE TO STOP OR CONTROL WORRYING: SEVERAL DAYS
1. FEELING NERVOUS, ANXIOUS, OR ON EDGE: SEVERAL DAYS
IF YOU CHECKED OFF ANY PROBLEMS ON THIS QUESTIONNAIRE, HOW DIFFICULT HAVE THESE PROBLEMS MADE IT FOR YOU TO DO YOUR WORK, TAKE CARE OF THINGS AT HOME, OR GET ALONG WITH OTHER PEOPLE: NOT DIFFICULT AT ALL
3. WORRYING TOO MUCH ABOUT DIFFERENT THINGS: SEVERAL DAYS
GAD7 TOTAL SCORE: 3
7. FEELING AFRAID AS IF SOMETHING AWFUL MIGHT HAPPEN: NOT AT ALL

## 2020-06-12 NOTE — PROGRESS NOTES
"Abelino Gracia is a 35 year old male who is being evaluated via a billable video visit.      The patient has been notified of following:     \"This video visit will be conducted via a call between you and your physician/provider. We have found that certain health care needs can be provided without the need for an in-person physical exam.  This service lets us provide the care you need with a video conversation.  If a prescription is necessary we can send it directly to your pharmacy.  If lab work is needed we can place an order for that and you can then stop by our lab to have the test done at a later time.    Video visits are billed at different rates depending on your insurance coverage.  Please reach out to your insurance provider with any questions.    If during the course of the call the physician/provider feels a video visit is not appropriate, you will not be charged for this service.\"    Patient has given verbal consent for Video visit? Yes    Will anyone else be joining your video visit? No    Subjective     Abelino Gracia is a 35 year old male who presents today via video visit for the following health issues:    HPI  Diabetes Follow-up      How often are you checking your blood sugar? Once in a while    What concerns do you have today about your diabetes? Other: patient had labs done from nephrology and was concerned about Januvia med     Do you have any of these symptoms? (Select all that apply)  No numbness or tingling in feet.  No redness, sores or blisters on feet.  No complaints of excessive thirst.  No reports of blurry vision.  No significant changes to weight.    Have you had a diabetic eye exam in the last 12 months? No        BP Readings from Last 2 Encounters:   12/16/19 128/86   09/30/19 121/88     Hemoglobin A1C (%)   Date Value   12/16/2019 6.0 (H)   07/30/2019 6.4 (H)     LDL Cholesterol Calculated (mg/dL)   Date Value   07/30/2019 86   02/08/2018 91           How many servings " of fruits and vegetables do you eat daily?  Not as much    On average, how many sweetened beverages do you drink each day (Examples: soda, juice, sweet tea, etc.  Do NOT count diet or artificially sweetened beverages)?   0    How many days per week do you exercise enough to make your heart beat faster? 0    How many minutes a day do you exercise enough to make your heart beat faster? 0    How many days per week do you miss taking your medication? 0        Video Start Time:1:02 PM          Patient Active Problem List   Diagnosis     Major depressive disorder     Insomnia     Migraine headache     Asthma, mild intermittent     DESHAUN (obstructive sleep apnea)- mild (AHI 5)     CARDIOVASCULAR SCREENING; LDL GOAL LESS THAN 160     CKD (chronic kidney disease) stage 3, GFR 30-59 ml/min (H)     Leukocytosis     QURESHI (nonalcoholic steatohepatitis)     BMI 31.0-31.9,adult     Hypophosphatemia     Alcohol dependence in remission (H)     Generalized anxiety disorder     Morbid obesity (H)     Secondary renal hyperparathyroidism (H)     Past Surgical History:   Procedure Laterality Date     APPENDECTOMY  08/25/2004     C NONSPECIFIC PROCEDURE   Feb 2011     Laparoscopic cholecystectomy.     CHOLECYSTECTOMY       COLONOSCOPY N/A 11/14/2016    Procedure: COLONOSCOPY;  Surgeon: Mauro Tan MD;  Location:  GI     TONSILLECTOMY  06/08/2006       Social History     Tobacco Use     Smoking status: Former Smoker     Packs/day: 1.00     Years: 20.00     Pack years: 20.00     Types: Cigarettes     Start date: 1/1/1998     Last attempt to quit: 3/5/2017     Years since quitting: 3.2     Smokeless tobacco: Never Used     Tobacco comment: Have tried chantix   Substance Use Topics     Alcohol use: No     Alcohol/week: 0.0 standard drinks     Comment: SOBER SINCE JANUARY 2009     Family History   Problem Relation Age of Onset     Allergies Mother      Allergies Brother      Heart Disease Maternal Grandfather      Hypertension Maternal  Grandfather      Other Cancer Maternal Grandfather      Asthma Other      Cancer - colorectal Maternal Grandmother 80     Cancer Maternal Grandmother 40        uterine cancer     Kidney Disease Maternal Grandmother          of kidney failure - ~ age 80     Diabetes Maternal Grandmother      Colon Cancer Maternal Grandmother      Other Cancer Maternal Grandmother          Current Outpatient Medications   Medication Sig Dispense Refill     acetaminophen (TYLENOL) 325 MG tablet Take 325 mg by mouth as needed        albuterol (PROAIR HFA) 108 (90 Base) MCG/ACT inhaler Inhale 2 puffs into the lungs every 4 hours as needed 2 Inhaler 3     blood glucose monitoring ([employer] CONTOUR MONITOR) meter device kit Use to test blood sugars one time daily. Strips Ex: 2019, Lot: [id number], SN: [id number] 1 kit 0     blood glucose monitoring (SARA CONTOUR) test strip Use to test blood sugars one time daily 50 strip 11     blood glucose monitoring (SARA MICROLET) lancets Use to test blood sugar 1 times daily 100 each 11     buPROPion (WELLBUTRIN XL) 300 MG 24 hr tablet Take 1 tablet (300 mg) by mouth daily To fill when current expires 90 tablet 3     cetirizine (ZYRTEC) 10 MG tablet Take 10 mg by mouth daily as needed        diphenhydrAMINE (BENADRYL) 25 MG tablet Take 50 mg by mouth nightly as needed for itching or allergies       Fexofenadine HCl (ALLEGRA PO) Take 60 mg by mouth daily as needed        fluticasone (FLONASE) 50 MCG/ACT nasal spray Spray 1-2 sprays into both nostrils daily as needed  1 Package 11     liraglutide (VICTOZA) 18 MG/3ML solution Inject 0.6 mg Subcutaneous daily 3 mL 3     losartan (COZAAR) 25 MG tablet Take 0.5 tablets (12.5 mg) by mouth daily To fill when current expires 45 tablet 3     melatonin 3 MG tablet Take 6 mg by mouth nightly as needed for sleep        propranolol ER (INDERAL LA) 60 MG 24 hr capsule Take 1 capsule (60 mg) by mouth daily To fill when current expires 90 capsule 3      albuterol (PROVENTIL) (2.5 MG/3ML) 0.083% neb solution Take 1 vial (2.5 mg) by nebulization every 4 hours as needed for shortness of breath / dyspnea 300 mL 3     Allergies   Allergen Reactions     Clarithromycin Hives     Penicillins      Sulfa Drugs      Topamax [Topiramate]      Decreased gfr and decrease phosphorous with use     Adhesive Tape Rash     Recent Labs   Lab Test 06/12/20  1103 05/26/20  1450 12/16/19  0911  09/27/19  0710  07/30/19  0903  08/28/18  0736 08/28/18  0735  02/08/18  0916  03/07/16  0810  08/24/15  0728   A1C  --   --  6.0*  --   --   --  6.4*  --   --  6.4*  --   --   --   --   --   --    LDL  --   --   --   --   --   --  86  --   --   --   --  91  --  103*  --   --    HDL  --   --   --   --   --   --  46  --   --   --   --  40  --  35*  --   --    TRIG  --   --   --   --   --   --  114  --   --   --   --  140  --  122  --   --    ALT  --   --   --   --  84*  --   --   --  128* 126*  --  107*   < >  --    < >  --    CR 2.09* 2.24* 2.11*   < > 2.29*   < > 2.01*   < > 1.92* 1.90*   < > 1.92*   < >  --    < > 1.46*   GFRESTIMATED 39* 36* 39*   < > 35*   < > 41*   < > 40* 40*   < > 40*   < >  --    < > 56*   GFRESTBLACK 45* 42* 45*   < > 41*   < > 48*   < > 48* 49*   < > 48*   < >  --    < > 67   POTASSIUM 4.3 4.0 4.1   < > 4.2   < > 4.5   < > 4.3 4.2   < > 4.3   < >  --    < > 4.6   TSH  --   --   --   --   --   --  0.76  --   --   --   --   --   --   --   --  1.00    < > = values in this interval not displayed.      BP Readings from Last 3 Encounters:   12/16/19 128/86   09/30/19 121/88   09/16/19 118/83    Wt Readings from Last 3 Encounters:   12/16/19 105.8 kg (233 lb 3.2 oz)   09/30/19 112 kg (247 lb)   09/16/19 114.3 kg (252 lb)                    Reviewed and updated as needed this visit by Provider         Review of Systems   Constitutional, HEENT, cardiovascular, pulmonary, gi and gu systems are negative, except as otherwise noted.      Objective    There were no vitals taken for  "this visit.  Estimated body mass index is 32.52 kg/m  as calculated from the following:    Height as of 9/30/19: 1.803 m (5' 11\").    Weight as of 12/16/19: 105.8 kg (233 lb 3.2 oz).  Physical Exam     GENERAL: Healthy, alert and no distress  EYES: Eyes grossly normal to inspection.  No discharge or erythema, or obvious scleral/conjunctival abnormalities.  RESP: No audible wheeze, cough, or visible cyanosis.  No visible retractions or increased work of breathing.    SKIN: Visible skin clear. No significant rash, abnormal pigmentation or lesions.  NEURO: Cranial nerves grossly intact.  Mentation and speech appropriate for age.  PSYCH: Mentation appears normal, affect normal/bright, judgement and insight intact, normal speech and appearance well-groomed.  Januvia.  Caused fluctuated     Diagnostic Test Results:  Labs reviewed in Epic  Results for orders placed or performed in visit on 06/12/20   Renal panel     Status: Abnormal   Result Value Ref Range    Sodium 139 133 - 144 mmol/L    Potassium 4.3 3.4 - 5.3 mmol/L    Chloride 107 94 - 109 mmol/L    Carbon Dioxide 26 20 - 32 mmol/L    Anion Gap 6 3 - 14 mmol/L    Glucose 103 (H) 70 - 99 mg/dL    Urea Nitrogen 26 7 - 30 mg/dL    Creatinine 2.09 (H) 0.66 - 1.25 mg/dL    GFR Estimate 39 (L) >60 mL/min/[1.73_m2]    GFR Estimate If Black 45 (L) >60 mL/min/[1.73_m2]    Calcium 9.6 8.5 - 10.1 mg/dL    Phosphorus 3.6 2.5 - 4.5 mg/dL    Albumin 3.6 3.4 - 5.0 g/dL   *UA reflex to Microscopic and Culture ([place] and [facility] (except [place] and [place])     Status: None    Specimen: Midstream Urine   Result Value Ref Range    Color Urine Yellow     Appearance Urine Clear     Glucose Urine Negative NEG^Negative mg/dL    Bilirubin Urine Negative NEG^Negative    Ketones Urine Negative NEG^Negative mg/dL    Specific Gravity Urine <=1.005 1.003 - 1.035    Blood Urine Negative NEG^Negative    pH Urine 6.5 5.0 - 7.0 pH    Protein Albumin Urine Negative NEG^Negative mg/dL    " "Urobilinogen Urine 0.2 0.2 - 1.0 EU/dL    Nitrite Urine Negative NEG^Negative    Leukocyte Esterase Urine Negative NEG^Negative    Source Midstream Urine           Assessment & Plan       ICD-10-CM    1. Type 2 diabetes, HbA1C goal < 8% (H)  E11.9 liraglutide (VICTOZA) 18 MG/3ML solution   2. Moderate anxiety  F41.9 buPROPion (WELLBUTRIN XL) 300 MG 24 hr tablet   3. CKD (chronic kidney disease) stage 3, GFR 30-59 ml/min (H)  N18.3 losartan (COZAAR) 25 MG tablet   4. Migraine with aura and without status migrainosus, not intractable  G43.109 propranolol ER (INDERAL LA) 60 MG 24 hr capsule        BMI:   Estimated body mass index is 32.52 kg/m  as calculated from the following:    Height as of 9/30/19: 1.803 m (5' 11\").    Weight as of 12/16/19: 105.8 kg (233 lb 3.2 oz).   Weight management plan: Discussed healthy diet and exercise guidelines        Regular exercise    No follow-ups on file.    Roberth Tavares MD  [facility]      Video-Visit Details    Type of service:  Video Visit  1:02-1:14  Video End Time:114 PM    Originating Location (pt. Location): Home    Distant Location (provider location):  Critical access hospital     Platform used for Video Visit: Debbie    No follow-ups on file.       Roberth Tavares MD      "

## 2020-06-13 ASSESSMENT — ASTHMA QUESTIONNAIRES: ACT_TOTALSCORE: 22

## 2020-06-13 ASSESSMENT — ANXIETY QUESTIONNAIRES: GAD7 TOTAL SCORE: 3

## 2020-06-18 DIAGNOSIS — N18.30 CKD (CHRONIC KIDNEY DISEASE) STAGE 3, GFR 30-59 ML/MIN (H): Primary | ICD-10-CM

## 2020-06-22 NOTE — TELEPHONE ENCOUNTER
Left message for IR .     Ruthie Acevedo, RN, BSN  Nephrology Care Coordinator  [facility]

## 2020-06-23 NOTE — TELEPHONE ENCOUNTER
Spoke to IR team. IR is requesting an ultrasound to be done prior to scheduling the biopsy. Will discuss with Dr. Mena and connect with patient.    Ruthie Acevedo, RN, BSN  Nephrology Care Coordinator  [facility]

## 2020-06-29 ENCOUNTER — MYC MEDICAL ADVICE (OUTPATIENT)
Dept: FAMILY MEDICINE | Facility: CLINIC | Age: 35
End: 2020-06-29

## 2020-06-29 DIAGNOSIS — E11.9 TYPE 2 DIABETES, HBA1C GOAL < 8% (H): ICD-10-CM

## 2020-06-29 RX ORDER — LIRAGLUTIDE 6 MG/ML
0.6 INJECTION SUBCUTANEOUS DAILY
Qty: 9 ML | Refills: 1 | Status: SHIPPED | OUTPATIENT
Start: 2020-06-29 | End: 2020-12-31

## 2020-06-29 NOTE — TELEPHONE ENCOUNTER
See patient MyChart message, states insurance wants 90 day supply victoza sent.   I see Dr. Tavares sent one month with refills on 6/12.  Cued 90 days and routed to Dr Tavares to address/send.    Sumi Leal RN  [facility]

## 2020-07-03 ENCOUNTER — ANCILLARY PROCEDURE (OUTPATIENT)
Dept: ULTRASOUND IMAGING | Facility: CLINIC | Age: 35
End: 2020-07-03
Attending: INTERNAL MEDICINE
Payer: COMMERCIAL

## 2020-07-03 DIAGNOSIS — N18.30 CKD (CHRONIC KIDNEY DISEASE) STAGE 3, GFR 30-59 ML/MIN (H): Chronic | ICD-10-CM

## 2020-07-03 PROCEDURE — 76770 US EXAM ABDO BACK WALL COMP: CPT | Performed by: RADIOLOGY

## 2020-07-08 DIAGNOSIS — Z11.59 ENCOUNTER FOR SCREENING FOR OTHER VIRAL DISEASES: Primary | ICD-10-CM

## 2020-07-13 ENCOUNTER — TELEPHONE (OUTPATIENT)
Dept: INTERVENTIONAL RADIOLOGY/VASCULAR | Facility: CLINIC | Age: 35
End: 2020-07-13

## 2020-07-13 DIAGNOSIS — Z11.59 ENCOUNTER FOR SCREENING FOR OTHER VIRAL DISEASES: ICD-10-CM

## 2020-07-13 PROCEDURE — U0003 INFECTIOUS AGENT DETECTION BY NUCLEIC ACID (DNA OR RNA); SEVERE ACUTE RESPIRATORY SYNDROME CORONAVIRUS 2 (SARS-COV-2) (CORONAVIRUS DISEASE [COVID-19]), AMPLIFIED PROBE TECHNIQUE, MAKING USE OF HIGH THROUGHPUT TECHNOLOGIES AS DESCRIBED BY CMS-2020-01-R: HCPCS | Performed by: INTERNAL MEDICINE

## 2020-07-14 LAB
SARS-COV-2 RNA SPEC QL NAA+PROBE: NOT DETECTED
SPECIMEN SOURCE: NORMAL

## 2020-07-15 ENCOUNTER — APPOINTMENT (OUTPATIENT)
Dept: MEDSURG UNIT | Facility: CLINIC | Age: 35
End: 2020-07-15
Attending: INTERNAL MEDICINE
Payer: COMMERCIAL

## 2020-07-15 ENCOUNTER — HOSPITAL ENCOUNTER (OUTPATIENT)
Facility: CLINIC | Age: 35
Discharge: HOME | End: 2020-07-15
Attending: INTERNAL MEDICINE | Admitting: PHYSICIAN ASSISTANT
Payer: COMMERCIAL

## 2020-07-15 ENCOUNTER — APPOINTMENT (OUTPATIENT)
Dept: INTERVENTIONAL RADIOLOGY/VASCULAR | Facility: CLINIC | Age: 35
End: 2020-07-15
Attending: INTERNAL MEDICINE
Payer: COMMERCIAL

## 2020-07-15 VITALS
SYSTOLIC BLOOD PRESSURE: 120 MMHG | OXYGEN SATURATION: 98 % | DIASTOLIC BLOOD PRESSURE: 78 MMHG | HEART RATE: 93 BPM | TEMPERATURE: 98.4 F | RESPIRATION RATE: 16 BRPM

## 2020-07-15 DIAGNOSIS — N18.30 CKD (CHRONIC KIDNEY DISEASE) STAGE 3, GFR 30-59 ML/MIN (H): ICD-10-CM

## 2020-07-15 LAB
BASOPHILS # BLD AUTO: 0.1 10E9/L (ref 0–0.2)
BASOPHILS NFR BLD AUTO: 0.9 %
DIFFERENTIAL METHOD BLD: ABNORMAL
EOSINOPHIL # BLD AUTO: 1 10E9/L (ref 0–0.7)
EOSINOPHIL NFR BLD AUTO: 9.9 %
ERYTHROCYTE [DISTWIDTH] IN BLOOD BY AUTOMATED COUNT: 13 % (ref 10–15)
GLUCOSE BLDC GLUCOMTR-MCNC: 125 MG/DL (ref 70–99)
HCT VFR BLD AUTO: 49.6 % (ref 40–53)
HGB BLD-MCNC: 16.2 G/DL (ref 13.3–17.7)
IMM GRANULOCYTES # BLD: 0 10E9/L (ref 0–0.4)
IMM GRANULOCYTES NFR BLD: 0.3 %
INR PPP: 1.05 (ref 0.86–1.14)
LYMPHOCYTES # BLD AUTO: 1.8 10E9/L (ref 0.8–5.3)
LYMPHOCYTES NFR BLD AUTO: 18.2 %
MCH RBC QN AUTO: 29.2 PG (ref 26.5–33)
MCHC RBC AUTO-ENTMCNC: 32.7 G/DL (ref 31.5–36.5)
MCV RBC AUTO: 90 FL (ref 78–100)
MONOCYTES # BLD AUTO: 0.7 10E9/L (ref 0–1.3)
MONOCYTES NFR BLD AUTO: 7 %
NEUTROPHILS # BLD AUTO: 6.4 10E9/L (ref 1.6–8.3)
NEUTROPHILS NFR BLD AUTO: 63.7 %
NRBC # BLD AUTO: 0 10*3/UL
NRBC BLD AUTO-RTO: 0 /100
PLATELET # BLD AUTO: 348 10E9/L (ref 150–450)
RBC # BLD AUTO: 5.54 10E12/L (ref 4.4–5.9)
WBC # BLD AUTO: 10.1 10E9/L (ref 4–11)

## 2020-07-15 PROCEDURE — 88313 SPECIAL STAINS GROUP 2: CPT | Performed by: INTERNAL MEDICINE

## 2020-07-15 PROCEDURE — 99152 MOD SED SAME PHYS/QHP 5/>YRS: CPT

## 2020-07-15 PROCEDURE — 85610 PROTHROMBIN TIME: CPT | Performed by: RADIOLOGY

## 2020-07-15 PROCEDURE — 50200 RENAL BIOPSY PERQ: CPT | Mod: LT

## 2020-07-15 PROCEDURE — 25000125 ZZHC RX 250: Performed by: STUDENT IN AN ORGANIZED HEALTH CARE EDUCATION/TRAINING PROGRAM

## 2020-07-15 PROCEDURE — 82962 GLUCOSE BLOOD TEST: CPT

## 2020-07-15 PROCEDURE — 85025 COMPLETE CBC W/AUTO DIFF WBC: CPT | Performed by: RADIOLOGY

## 2020-07-15 PROCEDURE — 25000128 H RX IP 250 OP 636: Performed by: STUDENT IN AN ORGANIZED HEALTH CARE EDUCATION/TRAINING PROGRAM

## 2020-07-15 PROCEDURE — 88346 IMFLUOR 1ST 1ANTB STAIN PX: CPT | Performed by: INTERNAL MEDICINE

## 2020-07-15 PROCEDURE — 88305 TISSUE EXAM BY PATHOLOGIST: CPT | Performed by: INTERNAL MEDICINE

## 2020-07-15 PROCEDURE — 88348 ELECTRON MICROSCOPY DX: CPT | Performed by: INTERNAL MEDICINE

## 2020-07-15 PROCEDURE — 25800030 ZZH RX IP 258 OP 636: Performed by: RADIOLOGY

## 2020-07-15 PROCEDURE — 40000168 ZZH STATISTIC PP CARE STAGE 3

## 2020-07-15 PROCEDURE — 88350 IMFLUOR EA ADDL 1ANTB STN PX: CPT | Performed by: INTERNAL MEDICINE

## 2020-07-15 RX ORDER — FENTANYL CITRATE 50 UG/ML
25-50 INJECTION, SOLUTION INTRAMUSCULAR; INTRAVENOUS EVERY 5 MIN PRN
Status: DISCONTINUED | OUTPATIENT
Start: 2020-07-15 | End: 2020-07-15 | Stop reason: HOSPADM

## 2020-07-15 RX ORDER — DEXTROSE MONOHYDRATE 25 G/50ML
25-50 INJECTION, SOLUTION INTRAVENOUS
Status: DISCONTINUED | OUTPATIENT
Start: 2020-07-15 | End: 2020-07-15 | Stop reason: HOSPADM

## 2020-07-15 RX ORDER — NALOXONE HYDROCHLORIDE 0.4 MG/ML
.1-.4 INJECTION, SOLUTION INTRAMUSCULAR; INTRAVENOUS; SUBCUTANEOUS
Status: DISCONTINUED | OUTPATIENT
Start: 2020-07-15 | End: 2020-07-15 | Stop reason: HOSPADM

## 2020-07-15 RX ORDER — FLUMAZENIL 0.1 MG/ML
0.2 INJECTION, SOLUTION INTRAVENOUS
Status: DISCONTINUED | OUTPATIENT
Start: 2020-07-15 | End: 2020-07-15 | Stop reason: HOSPADM

## 2020-07-15 RX ORDER — LIDOCAINE 40 MG/G
CREAM TOPICAL
Status: DISCONTINUED | OUTPATIENT
Start: 2020-07-15 | End: 2020-07-15 | Stop reason: HOSPADM

## 2020-07-15 RX ORDER — SODIUM CHLORIDE 9 MG/ML
INJECTION, SOLUTION INTRAVENOUS CONTINUOUS
Status: DISCONTINUED | OUTPATIENT
Start: 2020-07-15 | End: 2020-07-15 | Stop reason: HOSPADM

## 2020-07-15 RX ORDER — NICOTINE POLACRILEX 4 MG
15-30 LOZENGE BUCCAL
Status: DISCONTINUED | OUTPATIENT
Start: 2020-07-15 | End: 2020-07-15 | Stop reason: HOSPADM

## 2020-07-15 RX ADMIN — SODIUM CHLORIDE: 9 INJECTION, SOLUTION INTRAVENOUS at 08:50

## 2020-07-15 RX ADMIN — LIDOCAINE HYDROCHLORIDE 10 ML: 10 INJECTION, SOLUTION EPIDURAL; INFILTRATION; INTRACAUDAL; PERINEURAL at 09:50

## 2020-07-15 RX ADMIN — MIDAZOLAM 1 MG: 1 INJECTION INTRAMUSCULAR; INTRAVENOUS at 09:45

## 2020-07-15 RX ADMIN — FENTANYL CITRATE 50 MCG: 50 INJECTION, SOLUTION INTRAMUSCULAR; INTRAVENOUS at 09:45

## 2020-07-15 RX ADMIN — FENTANYL CITRATE 50 MCG: 50 INJECTION, SOLUTION INTRAMUSCULAR; INTRAVENOUS at 09:56

## 2020-07-15 RX ADMIN — MIDAZOLAM 1 MG: 1 INJECTION INTRAMUSCULAR; INTRAVENOUS at 09:56

## 2020-07-15 NOTE — PROGRESS NOTES
Pt discharged at this time post kidney biopsy. Pt ambulates steady on feet and denies pain or dizziness. Voided clear yellow urine x3. Discharge teaching completed with all questions answered and copy to patient. Site appears clean, dry, intact, and soft.

## 2020-07-15 NOTE — IP AVS SNAPSHOT
MRN:[id number]                      After Visit Summary   7/15/2020    Abelino Gracia    MRN: [id number]           Visit Information        Department      7/15/2020  7:40 AM [address] [facility] [place]          Review of your medicines      UNREVIEWED medicines. Ask your doctor about these medicines       Dose / Directions   acetaminophen 325 MG tablet  Commonly known as:  TYLENOL      Dose:  325 mg  Take 325 mg by mouth as needed  Refills:  0     * albuterol 108 (90 Base) MCG/ACT inhaler  Commonly known as:  ProAir HFA  Used for:  Mild intermittent asthma without complication      Dose:  2 puff  Inhale 2 puffs into the lungs every 4 hours as needed  Quantity:  2 Inhaler  Refills:  3     * albuterol (2.5 MG/3ML) 0.083% neb solution  Commonly known as:  PROVENTIL  Used for:  Mild intermittent asthma without complication      Dose:  2.5 mg  Take 1 vial (2.5 mg) by nebulization every 4 hours as needed for shortness of breath / dyspnea  Quantity:  300 mL  Refills:  3     ALLEGRA PO      Dose:  60 mg  Take 60 mg by mouth daily as needed  Refills:  0     buPROPion 300 MG 24 hr tablet  Commonly known as:  WELLBUTRIN XL  Used for:  Moderate anxiety      Dose:  300 mg  Take 1 tablet (300 mg) by mouth daily To fill when current expires  Quantity:  90 tablet  Refills:  3     cetirizine 10 MG tablet  Commonly known as:  zyrTEC      Dose:  10 mg  Take 10 mg by mouth daily as needed  Refills:  0     diphenhydrAMINE 25 MG tablet  Commonly known as:  BENADRYL      Dose:  50 mg  Take 50 mg by mouth nightly as needed for itching or allergies  Refills:  0     Flonase 50 MCG/ACT nasal spray  Generic drug:  fluticasone      Dose:  1-2 spray  Spray 1-2 sprays into both nostrils daily as needed  Quantity:  1 Package  Refills:  11     liraglutide 18 MG/3ML solution  Commonly known as:  VICTOZA  Used for:  Type 2 diabetes, HbA1C goal < 8% (H)      Dose:  0.6 mg  Inject 0.6 mg Subcutaneous daily  Quantity:  9  mL  Refills:  1     losartan 25 MG tablet  Commonly known as:  COZAAR  Used for:  CKD (chronic kidney disease) stage 3, GFR 30-59 ml/min (H)      Dose:  12.5 mg  Take 0.5 tablets (12.5 mg) by mouth daily To fill when current expires  Quantity:  45 tablet  Refills:  3     propranolol ER 60 MG 24 hr capsule  Commonly known as:  INDERAL LA  Used for:  Migraine with aura and without status migrainosus, not intractable      Dose:  60 mg  Take 1 capsule (60 mg) by mouth daily To fill when current expires  Quantity:  90 capsule  Refills:  3         * This list has 2 medication(s) that are the same as other medications prescribed for you. Read the directions carefully, and ask your doctor or other care provider to review them with you.            CONTINUE these medicines which have NOT CHANGED       Dose / Directions   blood glucose monitoring lancets  Used for:  Glucose intolerance (impaired glucose tolerance)      Use to test blood sugar 1 times daily  Quantity:  100 each  Refills:  11     blood glucose monitoring meter device kit  Used for:  Glucose intolerance (impaired glucose tolerance)      Use to test blood sugars one time daily. Strips Ex: 7/31/2019, Lot: [id number], SN: [id number]  Quantity:  1 kit  Refills:  0     blood glucose test strip  Commonly known as:  SARA CONTOUR  Used for:  Glucose intolerance (impaired glucose tolerance)      Use to test blood sugars one time daily  Quantity:  50 strip  Refills:  11     melatonin 3 MG tablet      Dose:  6 mg  Take 6 mg by mouth nightly as needed for sleep  Refills:  0              Protect others around you: Learn how to safely use, store and throw away your medicines at www.disposemymeds.org.       Follow-ups after your visit       Your next 10 appointments already scheduled    Sep 22, 2020  7:30 AM CDT  LAB with LAB FIRST FLOOR [facility] ([facility] [address] [address] [place] [id number] [phone]   Please do  not eat 10-12 hours before your appointment if you are coming in fasting for labs on lipids, cholesterol, or glucose (sugar). Does not apply to pregnant women. Water, tea and black coffee (with nothing added) is okay. Do not drink other fluids, diet soda or gum. If you have concerns about taking your medications, please send a message by clicking on Secure Messaging, Message Your Care Team.     Sep 22, 2020  8:00 AM CDT  Return Visit with Amina Mena MD  [facility] ([facility] [address] [place] [id number] [phone]         Care Instructions       Further instructions from your care team       [facility]    Interventional Radiology  Patient Instructions Following Renal Biopsy    AFTER YOU GO HOME  ? If you were given sedation DO NOT drive or operate machinery at home or at work for at least 24 hours  ? DO relax and take it easy for 48 hours, no strenuous activity for 24 hours  ? DO drink plenty of fluids  ? DO resume your regular diet, unless otherwise instructed by your Primary Physician  ? Keep the dressing dry and in place for 24 hours.  ? DO NOT SMOKE FOR AT LEAST 24 HOURS, if you have been given any medications that were to help you relax or sedate you during your procedure  ? DO NOT drink alcoholic beverages the day of your procedure  ? DO NOT do any strenuous exercise or lifting (> 10 lbs) for at least 7 days following your procedure  ? DO NOT take a bath or shower for at least 12 hours following your procedure  ? Remove dressing after shower the next day. Replace with Band aid for 2 days.  Never leave a wet dressing in place.  ? DO NOT make any important or legal decisions for 24 hours following your procedure  ? There should be minimum drainage from the biopsy site    CALL THE PHYSICIAN IF:  ? You start bleeding from the procedure site.  If you do start to bleed from that site, lie down flat and hold pressure on the site  for a minimum of 10 minutes.  Your physician will tell you if you need to return to the hospital  ? You develop nausea or vomiting  ? You have excessive swelling, redness, or tenderness at the site  ? You have drainage that looks like it is infected.  ? You experience severe pain  ? You develop hives or a rash or unexplained itching  ? You develop shortness of breath  ? You develop a temperature of 101 degrees F or greater  ? You develop bloody clots or red urine after you are discharged  ? You develop chest pain or cough up blood, lightheadedness or fainting        [facility] INTERVENTIONAL RADIOLOGY DEPARTMENT  Procedure Physician: Dr. Giovani Garvin                                  Date of procedure: July 15, 2020  Telephone Numbers: [phone] Monday-Friday 8:00 am to 4:30 pm  [phone] After 4:30 pm Monday-Friday, Weekends & Holidays.   Ask for the Interventional Radiologist on call.  Someone is on call 24 hrs/day  [facility] toll free number: [phone] Monday-Friday 8:00 am to 4:30 pm  [facility] Emergency Dept: [phone]          Additional Information About Your Visit       DoctorChart Information    [employer] gives you secure access to your electronic health record. If you see a primary care provider, you can also send messages to your care team and make appointments. If you have questions, please call your primary care clinic.  If you do not have a primary care provider, please call [phone] and they will assist you.       Care EveryWhere ID    This is your Care EveryWhere ID. This could be used by other organizations to access your [facility] medical records  [id number]       Your Vitals Were  Most recent update: 7/15/2020 11:54 AM    Blood Pressure   112/78 (BP Location: Right arm)    Pulse   93    Temperature   98.4  F (36.9  C) (Oral)    Respirations   18    Pulse Oximetry   99%          Primary Care Provider Office Phone # Fax #    Roberth Tavares [phone]      Equal Access to Services     [facility] : Hadii aad ku viktoria Soserenity, waaxda luqadaha, qaybta kaalmada adedamaso, flip zeyad staralejandra sewellvandana reynagajorge daniel . So [facility] [phone].    ATENCIÓN: Si habla español, tiene a soto disposición servicios gratuitos de asistencia lingüística. Llame al [phone].    We comply with applicable federal and state civil rights laws, including the [place] Human Rights Act. We do not discriminate on the basis of race, color, creed, Gnosticist, national origin, marital status, age, disability, sex, sexual orientation, or gender identity.       Thank you!    Thank you for choosing [facility] for your care. Our goal is always to provide you with excellent care. Hearing back from our patients is one way we can continue to improve our services. Please take a few minutes to complete the written survey that you may receive in the mail after you visit with us. Thank you!            Medication List      Medications          Morning Afternoon Evening Bedtime As Needed    blood glucose monitoring lancets  INSTRUCTIONS:  Use to test blood sugar 1 times daily                     blood glucose monitoring meter device kit  INSTRUCTIONS:  Use to test blood sugars one time daily. Strips Ex: 7/31/2019, Lot: [id number], SN: [id number]                     blood glucose test strip  Also known as:  SARA CONTOUR  INSTRUCTIONS:  Use to test blood sugars one time daily                     melatonin 3 MG tablet  INSTRUCTIONS:  Take 6 mg by mouth nightly as needed for sleep                       ASK your doctor about these medications          Morning Afternoon Evening Bedtime As Needed    acetaminophen 325 MG tablet  Also known as:  TYLENOL  INSTRUCTIONS:  Take 325 mg by mouth as needed                     * albuterol 108 (90 Base) MCG/ACT inhaler  Also known as:  ProAir HFA  INSTRUCTIONS:  Inhale 2 puffs into the lungs every 4 hours as needed                     * albuterol (2.5 MG/3ML) 0.083% neb solution  Also known as:   PROVENTIL  INSTRUCTIONS:  Take 1 vial (2.5 mg) by nebulization every 4 hours as needed for shortness of breath / dyspnea                     ALLEGRA PO  INSTRUCTIONS:  Take 60 mg by mouth daily as needed                     buPROPion 300 MG 24 hr tablet  Also known as:  WELLBUTRIN XL  INSTRUCTIONS:  Take 1 tablet (300 mg) by mouth daily To fill when current expires                     cetirizine 10 MG tablet  Also known as:  zyrTEC  INSTRUCTIONS:  Take 10 mg by mouth daily as needed                     diphenhydrAMINE 25 MG tablet  Also known as:  BENADRYL  INSTRUCTIONS:  Take 50 mg by mouth nightly as needed for itching or allergies                     Flonase 50 MCG/ACT nasal spray  INSTRUCTIONS:  Spray 1-2 sprays into both nostrils daily as needed  Generic drug:  fluticasone                     liraglutide 18 MG/3ML solution  Also known as:  VICTOZA  INSTRUCTIONS:  Inject 0.6 mg Subcutaneous daily                     losartan 25 MG tablet  Also known as:  COZAAR  INSTRUCTIONS:  Take 0.5 tablets (12.5 mg) by mouth daily To fill when current expires                     propranolol ER 60 MG 24 hr capsule  Also known as:  INDERAL LA  INSTRUCTIONS:  Take 1 capsule (60 mg) by mouth daily To fill when current expires                        * This list has 2 medication(s) that are the same as other medications prescribed for you. Read the directions carefully, and ask your doctor or other care provider to review them with you.

## 2020-07-15 NOTE — IP AVS SNAPSHOT
[address] [address]                                    After Visit Summary   7/15/2020    Abelino Gracia    MRN: [id number]           After Visit Summary Signature Page    I have received my discharge instructions, and my questions have been answered. I have discussed any challenges I see with this plan with the nurse or doctor.    ..........................................................................................................................................  Patient/Patient Representative Signature      ..........................................................................................................................................  Patient Representative Print Name and Relationship to Patient    ..................................................               ................................................  Date                                   Time    ..........................................................................................................................................  Reviewed by Signature/Title    ...................................................              ..............................................  Date                                               Time          22EPIC Rev 08/18

## 2020-07-15 NOTE — DISCHARGE INSTRUCTIONS
[facility]    Interventional Radiology  Patient Instructions Following Renal Biopsy    AFTER YOU GO HOME  ? If you were given sedation DO NOT drive or operate machinery at home or at work for at least 24 hours  ? DO relax and take it easy for 48 hours, no strenuous activity for 24 hours  ? DO drink plenty of fluids  ? DO resume your regular diet, unless otherwise instructed by your Primary Physician  ? Keep the dressing dry and in place for 24 hours.  ? DO NOT SMOKE FOR AT LEAST 24 HOURS, if you have been given any medications that were to help you relax or sedate you during your procedure  ? DO NOT drink alcoholic beverages the day of your procedure  ? DO NOT do any strenuous exercise or lifting (> 10 lbs) for at least 7 days following your procedure  ? DO NOT take a bath or shower for at least 12 hours following your procedure  ? Remove dressing after shower the next day. Replace with Band aid for 2 days.  Never leave a wet dressing in place.  ? DO NOT make any important or legal decisions for 24 hours following your procedure  ? There should be minimum drainage from the biopsy site    CALL THE PHYSICIAN IF:  ? You start bleeding from the procedure site.  If you do start to bleed from that site, lie down flat and hold pressure on the site for a minimum of 10 minutes.  Your physician will tell you if you need to return to the hospital  ? You develop nausea or vomiting  ? You have excessive swelling, redness, or tenderness at the site  ? You have drainage that looks like it is infected.  ? You experience severe pain  ? You develop hives or a rash or unexplained itching  ? You develop shortness of breath  ? You develop a temperature of 101 degrees F or greater  ? You develop bloody clots or red urine after you are discharged  ? You develop chest pain or cough up blood, lightheadedness or fainting        [facility] INTERVENTIONAL RADIOLOGY DEPARTMENT  Procedure Physician: Dr. Giovani Garvin                                   Date of procedure: July 15, 2020  Telephone Numbers: [phone] Monday-Friday 8:00 am to 4:30 pm  [phone] After 4:30 pm Monday-Friday, Weekends & Holidays.   Ask for the Interventional Radiologist on call.  Someone is on call 24 hrs/day  [facility] toll free number: [phone] Monday-Friday 8:00 am to 4:30 pm  [facility] Emergency Dept: [phone]

## 2020-07-15 NOTE — PROGRESS NOTES
Prep complete for US guided renal biopsy. Patients  [facility] at bedside will transport patient home post procedure cell# [phone].

## 2020-07-15 NOTE — PROGRESS NOTES
Patient Name: Abelino Gracia  Medical Record Number: [id number]  Today's Date: 7/15/2020    Procedure: Left Native Renal Biopsy  Proceduralist: Giovani Garvin PA-C    Procedure Start: 0945  Procedure end: 1005  Sedation medications administered: Fentanyl 100 mcg, Versed 2 mg    Report given to: FRANSISCA Mcduffie 2A  : n/a    Other Notes: Pt arrived to IR room #6 from [address]. Consent reviewed. Pt denies any questions or concerns regarding procedure. Pt positioned prone and monitored per protocol. Left Renal Biopsy done, cores x 3 collected at bedside by renal pathology. Pt tolerated procedure without any noted complications. Pt transferred back to [address].

## 2020-07-15 NOTE — PROGRESS NOTES
Pt back to unit 2a post native left kidney biopsy. Site appears clean, dry, intact, and soft. Pt denies pain.

## 2020-07-15 NOTE — PROCEDURES
[facility], [place]    Procedure: IR RENAL BIOPSY LEFT    Date/Time: 7/15/2020 10:07 AM  Performed by: Tc Garvin PA-C  Authorized by: Tc Garvin PA-C     UNIVERSAL PROTOCOL   Site Marked: No  Prior Images Obtained and Reviewed:  Yes  Required items: Required blood products, implants, devices and special equipment available    Patient identity confirmed:  Verbally with patient, arm band, provided demographic data and hospital-assigned identification number  Patient was reevaluated immediately before administering moderate or deep sedation or anesthesia  Confirmation Checklist:  Patient's identity using two indicators, relevant allergies, procedure was appropriate and matched the consent or emergent situation and correct equipment/implants were available  Time out: Immediately prior to the procedure a time out was called    Universal Protocol: the Joint Commission Universal Protocol was followed    Preparation: Patient was prepped and draped in usual sterile fashion           ANESTHESIA    Anesthesia: Local infiltration  Local Anesthetic:  Lidocaine 1% without epinephrine  Anesthetic Total (mL):  10      SEDATION    Patient Sedated: Yes    Sedation Type:  Moderate (conscious) sedation  Sedation:  Fentanyl and midazolam  Vital signs: Vital signs monitored during sedation    See dictated procedure note for full details.  Findings: 100 mcg and 2 mg, 20 minutes, tolerated well    Specimens: core needle biopsy specimens sent for pathological analysis    Complications: None    Condition: Stable    Plan: 1 hour monitored recovery.    PROCEDURE   Patient Tolerance:  Patient tolerated the procedure well with no immediate complications  Describe Procedure: Left native random renal biopsy. 3 cores sent for study. Gelfoam in tract.  Length of time physician/provider present for 1:1 monitoring during sedation: 0

## 2020-07-16 LAB — COPATH REPORT: NORMAL

## 2020-08-28 ENCOUNTER — OFFICE VISIT (OUTPATIENT)
Dept: FAMILY MEDICINE | Facility: CLINIC | Age: 35
End: 2020-08-28
Payer: COMMERCIAL

## 2020-08-28 VITALS
SYSTOLIC BLOOD PRESSURE: 128 MMHG | BODY MASS INDEX: 35.79 KG/M2 | HEART RATE: 93 BPM | WEIGHT: 250 LBS | HEIGHT: 70 IN | TEMPERATURE: 98.7 F | DIASTOLIC BLOOD PRESSURE: 75 MMHG

## 2020-08-28 DIAGNOSIS — M65.4 DE QUERVAIN'S DISEASE (TENOSYNOVITIS): ICD-10-CM

## 2020-08-28 DIAGNOSIS — E11.9 TYPE 2 DIABETES, HBA1C GOAL < 8% (H): ICD-10-CM

## 2020-08-28 DIAGNOSIS — M79.645 THUMB PAIN, LEFT: Primary | ICD-10-CM

## 2020-08-28 LAB
CHOLEST SERPL-MCNC: 157 MG/DL
HBA1C MFR BLD: 6.4 % (ref 0–5.6)
HDLC SERPL-MCNC: 36 MG/DL
LDLC SERPL CALC-MCNC: 96 MG/DL
NONHDLC SERPL-MCNC: 121 MG/DL
TRIGL SERPL-MCNC: 123 MG/DL

## 2020-08-28 PROCEDURE — 83036 HEMOGLOBIN GLYCOSYLATED A1C: CPT | Performed by: PHYSICIAN ASSISTANT

## 2020-08-28 PROCEDURE — 80061 LIPID PANEL: CPT | Performed by: PHYSICIAN ASSISTANT

## 2020-08-28 PROCEDURE — 36415 COLL VENOUS BLD VENIPUNCTURE: CPT | Performed by: PHYSICIAN ASSISTANT

## 2020-08-28 PROCEDURE — 99213 OFFICE O/P EST LOW 20 MIN: CPT | Performed by: PHYSICIAN ASSISTANT

## 2020-08-28 ASSESSMENT — MIFFLIN-ST. JEOR: SCORE: 2065.24

## 2020-08-28 NOTE — PROGRESS NOTES
"Subjective     Abelino Gracia is a 35 year old male who presents to clinic today for the following health issues:    HPI        hand pain   Onset/Duration: 2-3 months   Description  Location: hand - left  Joint Swelling: YES  Redness: no  Pain: YES  Warmth: no  Intensity:  1-8/10  Progression of Symptoms:  Worsening  Shoots up arm a bit   Accompanying signs and symptoms:   Fevers: no  Numbness/tingling/weakness: YES whole hand with swelling but pain in thumb   History  Trauma to the area: no  Recent illness:  no  Previous similar problem: no  Previous evaluation:  no  Precipitating or alleviating factors:  Aggravating factors include: lifting grabbing makes it worse   Therapies tried and outcome: support wrap,tylenol   Wakes him up at night.    Wears a brace a night    Susu and aicha a lot          Review of Systems   As above      Objective    /75   Pulse 93   Temp 98.7  F (37.1  C) (Tympanic)   Ht 1.778 m (5' 10\")   Wt 113.4 kg (250 lb)   BMI 35.87 kg/m    Body mass index is 35.87 kg/m .  Physical Exam  Constitutional:       General: He is not in acute distress.  Cardiovascular:      Rate and Rhythm: Normal rate and regular rhythm.   Pulmonary:      Effort: Pulmonary effort is normal.      Breath sounds: Normal breath sounds.   Musculoskeletal:      Right elbow: Normal.     Left elbow: Normal.      Right wrist: Normal.      Left wrist: He exhibits decreased range of motion (due to pain ), tenderness (along base of thumb) and swelling.      Comments: Positive finkelstein's on left   Negative tinel's and Phalens bilaterally    Neurological:      Mental Status: He is alert.                    Assessment & Plan     Thumb pain, left    - Miscellaneous DME Order    De Quervain's disease (tenosynovitis)  Wear brace for several weeks and if not improving he will call and I will place a referral to ortho   - Miscellaneous DME Order    Type 2 diabetes, HbA1C goal < 8% (H)  Follow up as needed  - " HEMOGLOBIN A1C  - Lipid panel reflex to direct LDL Non-fasting           Return in about 3 weeks (around 9/18/2020) for if not improving.    Ale Rodriguez PA-C  [facility]      DME (Durable Medical Equipment) Orders and Documentation  Orders Placed This Encounter   Procedures     Miscellaneous DME Order      The patient was assessed and it was determined the patient is in need of the following listed DME Supplies/Equipment. Please complete supporting documentation below to demonstrate medical necessity.      DME All Other Item(s) Documentation    List reason for need and supporting documentation for medical necessity below for each DME item.     1. Thumb pain

## 2020-09-17 DIAGNOSIS — N18.30 CKD (CHRONIC KIDNEY DISEASE) STAGE 3, GFR 30-59 ML/MIN (H): Primary | ICD-10-CM

## 2020-09-18 DIAGNOSIS — N18.30 CKD (CHRONIC KIDNEY DISEASE) STAGE 3, GFR 30-59 ML/MIN (H): ICD-10-CM

## 2020-09-18 LAB
ALBUMIN SERPL-MCNC: 3.4 G/DL (ref 3.4–5)
ANION GAP SERPL CALCULATED.3IONS-SCNC: 7 MMOL/L (ref 3–14)
BUN SERPL-MCNC: 29 MG/DL (ref 7–30)
CALCIUM SERPL-MCNC: 9 MG/DL (ref 8.5–10.1)
CHLORIDE SERPL-SCNC: 107 MMOL/L (ref 94–109)
CO2 SERPL-SCNC: 25 MMOL/L (ref 20–32)
CREAT SERPL-MCNC: 2.26 MG/DL (ref 0.66–1.25)
CREAT UR-MCNC: 50 MG/DL
GFR SERPL CREATININE-BSD FRML MDRD: 36 ML/MIN/{1.73_M2}
GLUCOSE SERPL-MCNC: 141 MG/DL (ref 70–99)
HGB BLD-MCNC: 15.3 G/DL (ref 13.3–17.7)
PHOSPHATE SERPL-MCNC: 3.2 MG/DL (ref 2.5–4.5)
POTASSIUM SERPL-SCNC: 4.1 MMOL/L (ref 3.4–5.3)
PROT UR-MCNC: 0.08 G/L
PROT/CREAT 24H UR: 0.16 G/G CR (ref 0–0.2)
PTH-INTACT SERPL-MCNC: 30 PG/ML (ref 18–80)
SODIUM SERPL-SCNC: 139 MMOL/L (ref 133–144)

## 2020-09-18 PROCEDURE — 36415 COLL VENOUS BLD VENIPUNCTURE: CPT | Performed by: INTERNAL MEDICINE

## 2020-09-18 PROCEDURE — 85018 HEMOGLOBIN: CPT | Performed by: INTERNAL MEDICINE

## 2020-09-18 PROCEDURE — 84156 ASSAY OF PROTEIN URINE: CPT | Performed by: INTERNAL MEDICINE

## 2020-09-18 PROCEDURE — 83970 ASSAY OF PARATHORMONE: CPT | Performed by: INTERNAL MEDICINE

## 2020-09-18 PROCEDURE — 80069 RENAL FUNCTION PANEL: CPT | Performed by: INTERNAL MEDICINE

## 2020-09-22 ENCOUNTER — OFFICE VISIT (OUTPATIENT)
Dept: ORTHOPEDICS | Facility: CLINIC | Age: 35
End: 2020-09-22
Attending: FAMILY MEDICINE
Payer: COMMERCIAL

## 2020-09-22 ENCOUNTER — VIRTUAL VISIT (OUTPATIENT)
Dept: NEPHROLOGY | Facility: CLINIC | Age: 35
End: 2020-09-22
Payer: COMMERCIAL

## 2020-09-22 ENCOUNTER — ANCILLARY PROCEDURE (OUTPATIENT)
Dept: GENERAL RADIOLOGY | Facility: CLINIC | Age: 35
End: 2020-09-22
Attending: ORTHOPAEDIC SURGERY
Payer: COMMERCIAL

## 2020-09-22 VITALS
RESPIRATION RATE: 14 BRPM | HEART RATE: 80 BPM | SYSTOLIC BLOOD PRESSURE: 138 MMHG | DIASTOLIC BLOOD PRESSURE: 114 MMHG | WEIGHT: 250 LBS | HEIGHT: 70 IN | BODY MASS INDEX: 35.79 KG/M2

## 2020-09-22 DIAGNOSIS — N25.81 SECONDARY RENAL HYPERPARATHYROIDISM (H): ICD-10-CM

## 2020-09-22 DIAGNOSIS — M65.4 TENOSYNOVITIS, DE QUERVAIN: Primary | ICD-10-CM

## 2020-09-22 DIAGNOSIS — K75.81 NASH (NONALCOHOLIC STEATOHEPATITIS): ICD-10-CM

## 2020-09-22 DIAGNOSIS — N18.30 CKD (CHRONIC KIDNEY DISEASE) STAGE 3, GFR 30-59 ML/MIN (H): Primary | ICD-10-CM

## 2020-09-22 DIAGNOSIS — M79.645 PAIN OF LEFT THUMB: ICD-10-CM

## 2020-09-22 PROCEDURE — 99243 OFF/OP CNSLTJ NEW/EST LOW 30: CPT | Mod: 25 | Performed by: ORTHOPAEDIC SURGERY

## 2020-09-22 PROCEDURE — 20550 NJX 1 TENDON SHEATH/LIGAMENT: CPT | Mod: LT | Performed by: ORTHOPAEDIC SURGERY

## 2020-09-22 PROCEDURE — 73140 X-RAY EXAM OF FINGER(S): CPT | Mod: LT

## 2020-09-22 PROCEDURE — 99214 OFFICE O/P EST MOD 30 MIN: CPT | Mod: 95 | Performed by: INTERNAL MEDICINE

## 2020-09-22 RX ORDER — TRIAMCINOLONE ACETONIDE 40 MG/ML
20 INJECTION, SUSPENSION INTRA-ARTICULAR; INTRAMUSCULAR
Status: DISCONTINUED | OUTPATIENT
Start: 2020-09-22 | End: 2022-03-21

## 2020-09-22 RX ORDER — LIDOCAINE HYDROCHLORIDE 10 MG/ML
0.5 INJECTION, SOLUTION INFILTRATION; PERINEURAL
Status: DISCONTINUED | OUTPATIENT
Start: 2020-09-22 | End: 2022-03-21

## 2020-09-22 RX ADMIN — LIDOCAINE HYDROCHLORIDE 0.5 ML: 10 INJECTION, SOLUTION INFILTRATION; PERINEURAL at 10:52

## 2020-09-22 RX ADMIN — TRIAMCINOLONE ACETONIDE 20 MG: 40 INJECTION, SUSPENSION INTRA-ARTICULAR; INTRAMUSCULAR at 10:52

## 2020-09-22 ASSESSMENT — MIFFLIN-ST. JEOR: SCORE: 2065.24

## 2020-09-22 NOTE — PROGRESS NOTES
Hand / Upper Extremity Injection/Arthrocentesis: L extensor compartment 1    Date/Time: 9/22/2020 10:52 AM  Performed by: Lauro Martinez MD  Authorized by: Lauro Martinez MD     Indications:  Pain  Needle Size:  25 G  Guidance: landmark    Condition: de Quervain's      Site:  L extensor compartment 1  Medications:  20 mg triamcinolone 40 MG/ML; 0.5 mL lidocaine 1 %  Outcome:  Tolerated well, no immediate complications  Procedure discussed: discussed risks, benefits, and alternatives    Consent Given by:  Patient  Timeout: timeout called immediately prior to procedure    Prep: patient was prepped and draped in usual sterile fashion

## 2020-09-22 NOTE — PROGRESS NOTES
Abelino Gracia is a 35 year old male who is seen in consultation at the request of Ale Rodriguez  for left thumb and wrist pain.  This started about 2 months ago.  He thinks it is due to excessive aicha.  He now has occasional sharp pain rated 2 out of 10.  It is primarily over the radial aspect of the wrist and thumb.  He is used a thumb spica splint for 3 weeks.  It has helped a bit.  He cannot take anti-inflammatories because of chronic kidney disease.  He has increased pain with grabbing, twisting, putting pressure on the hand.  If he lets it rest it helps.  He is right-hand dominant.    Past Medical History:   Diagnosis Date     Allergies     cats,dust, pollens     Depressive disorder      Leukocytosis      Leukocytosis      Panic disorder without agoraphobia      Uncomplicated asthma     Diagnosed as a child       Past Surgical History:   Procedure Laterality Date     APPENDECTOMY  2004     C NONSPECIFIC PROCEDURE   2011     Laparoscopic cholecystectomy.     CHOLECYSTECTOMY       COLONOSCOPY N/A 2016    Procedure: COLONOSCOPY;  Surgeon: Mauro Tan MD;  Location: [facility]     IR RENAL BIOPSY LEFT  7/15/2020     TONSILLECTOMY  2006       Family History   Problem Relation Age of Onset     Allergies Mother      Allergies Brother      Heart Disease Maternal Grandfather      Hypertension Maternal Grandfather      Other Cancer Maternal Grandfather      Asthma Other      Cancer - colorectal Maternal Grandmother 80     Cancer Maternal Grandmother 40        uterine cancer     Kidney Disease Maternal Grandmother          of kidney failure - ~ age 80     Diabetes Maternal Grandmother      Colon Cancer Maternal Grandmother      Other Cancer Maternal Grandmother        Social History     Socioeconomic History     Marital status:      Spouse name: Not on file     Number of children: 0     Years of education: Not on file     Highest education level: Not on file    Occupational History     Employer: [employer]     Comment: clinical pharmacy [employer]   Social Needs     Financial resource strain: Not on file     Food insecurity     Worry: Not on file     Inability: Not on file     Transportation needs     Medical: Not on file     Non-medical: Not on file   Tobacco Use     Smoking status: Former Smoker     Packs/day: 1.00     Years: 20.00     Pack years: 20.00     Types: Cigarettes     Start date: 1/1/1998     Last attempt to quit: 3/5/2017     Years since quitting: 3.5     Smokeless tobacco: Never Used     Tobacco comment: Have tried chantix   Substance and Sexual Activity     Alcohol use: No     Alcohol/week: 0.0 standard drinks     Comment: SOBER SINCE JANUARY 2009     Drug use: No     Sexual activity: Yes     Partners: Male     Comment: 1 partner   Lifestyle     Physical activity     Days per week: Not on file     Minutes per session: Not on file     Stress: Not on file   Relationships     Social connections     Talks on phone: Not on file     Gets together: Not on file     Attends Baptism service: Not on file     Active member of club or organization: Not on file     Attends meetings of clubs or organizations: Not on file     Relationship status: Not on file     Intimate partner violence     Fear of current or ex partner: Not on file     Emotionally abused: Not on file     Physically abused: Not on file     Forced sexual activity: Not on file   Other Topics Concern     Parent/sibling w/ CABG, MI or angioplasty before 65F 55M? No      Service No     Blood Transfusions No     Caffeine Concern No     Occupational Exposure No     Hobby Hazards No     Sleep Concern Yes     Stress Concern Yes     Weight Concern No     Special Diet No     Back Care No     Exercise No     Bike Helmet No     Seat Belt No     Self-Exams No   Social History Narrative     Not on file       Current Outpatient Medications   Medication Sig Dispense Refill      acetaminophen (TYLENOL) 325 MG tablet Take 325 mg by mouth as needed        albuterol (PROAIR HFA) 108 (90 Base) MCG/ACT inhaler Inhale 2 puffs into the lungs every 4 hours as needed 2 Inhaler 3     blood glucose monitoring (Ygle CONTOUR MONITOR) meter device kit Use to test blood sugars one time daily. Strips Ex: 7/31/2019, Lot: [id number], SN: [id number] 1 kit 0     blood glucose monitoring (SARA CONTOUR) test strip Use to test blood sugars one time daily 50 strip 11     blood glucose monitoring (Ygle MICROLET) lancets Use to test blood sugar 1 times daily 100 each 11     buPROPion (WELLBUTRIN XL) 300 MG 24 hr tablet Take 1 tablet (300 mg) by mouth daily To fill when current expires 90 tablet 3     Calcium Carbonate Antacid (TUMS PO) Take by mouth as needed       cetirizine (ZYRTEC) 10 MG tablet Take 10 mg by mouth daily as needed        diphenhydrAMINE (BENADRYL) 25 MG tablet Take 50 mg by mouth nightly as needed for itching or allergies       Fexofenadine HCl (ALLEGRA PO) Take 60 mg by mouth daily as needed        fluticasone (FLONASE) 50 MCG/ACT nasal spray Spray 1-2 sprays into both nostrils daily as needed  1 Package 11     liraglutide (VICTOZA) 18 MG/3ML solution Inject 0.6 mg Subcutaneous daily 9 mL 1     losartan (COZAAR) 25 MG tablet Take 0.5 tablets (12.5 mg) by mouth daily To fill when current expires 45 tablet 3     melatonin 3 MG tablet Take 6 mg by mouth nightly as needed for sleep        propranolol ER (INDERAL LA) 60 MG 24 hr capsule Take 1 capsule (60 mg) by mouth daily To fill when current expires 90 capsule 3     albuterol (PROVENTIL) (2.5 MG/3ML) 0.083% neb solution Take 1 vial (2.5 mg) by nebulization every 4 hours as needed for shortness of breath / dyspnea 300 mL 3       Allergies   Allergen Reactions     Clarithromycin Hives     Penicillins      Sulfa Drugs      Topamax [Topiramate]      Decreased gfr and decrease phosphorous with use     Adhesive Tape Rash       REVIEW OF  "SYSTEMS:  CONSTITUTIONAL:  NEGATIVE for fever, chills, change in weight, not feeling tired  SKIN:  NEGATIVE for worrisome rashes, no skin lumps, no skin ulcers and no non-healing wounds  EYES:  NEGATIVE for vision changes or irritation.  ENT/MOUTH:  NEGATIVE.  No hearing loss, no hoarseness, no difficulty swallowing.  RESP:  NEGATIVE. No cough or shortness of breath.  CV:  NEGATIVE for chest pain, palpitations or peripheral edema  GI:  NEGATIVE for nausea, abdominal pain, heartburn, or change in bowel habits  :  Negative. No dysuria, no hematuria  MUSCULOSKELETAL:  See HPI above  NEURO:  NEGATIVE . No headaches, no dizziness,  no numbness  ENDOCRINE:  NEGATIVE for temperature intolerance, skin/hair changes  HEME/ALLERGY/IMMUNE:  NEGATIVE for bleeding problems  PSYCHIATRIC:  NEGATIVE. no anxiety, no depression.     Exam:  Vitals: BP (!) 138/114   Pulse 80   Resp 14   Ht 1.778 m (5' 10\")   Wt 113.4 kg (250 lb)   BMI 35.87 kg/m    BMI= Body mass index is 35.87 kg/m .  Constitutional:  healthy, alert and no distress  Neuro: Alert and Oriented x 3, Sensation grossly WNL.  Psych: Affect normal   Respiratory: Breathing not labored.  Cardiovascular: normal peripheral pulses  Lymph: no adenopathy  Skin: No rashes,worrisome lesions or skin problems  He has tenderness along the first dorsal compartment tendons left hand.  He has positive Finkelstein test.  No tenderness at the A1 pulley of the thumb.  No pain with grind test of the thumb CMC joint.  Sensation, motor and circulation are intact.    Assessment:  Left deQuervain's tenosynovitis.    Plan:  Splint to rest thumb.  He would like to proceed with injection.  With the patient's consent, I injected the left  thumb extensor tendon sheath with Kenalog 20 mg and lidocaine after sterile prep.    Return to clinic as needed.    "

## 2020-09-22 NOTE — PROGRESS NOTES
09/22/20    CHIEF COMPLAINT: CKD     HISTORY OF PRESENT ILLNESS:  Abelino Gracia is a 35 year-old male who has had elevated creatinine levels dating back to 2006 according to [facility] records.      History taken from previous notes: His creatinine has ranged anywhere from 1.3-1.7 in his [facility] records.  He was seen by nephrology on one occasion while inpatient at [facility] in 2009 and his kidney injury at that time was felt to be related to long-standing heavy use of NSAIDs.  To briefly summarize risk factors for CKD: heavy NSAID use in the past, previous etoh abuse (sober since 09). Addt hx includes DESHAUN. His serologic workup including C3, C4, ANCA, Anti-GBM, and immunofixations were normal. He is felt to have QURESHI as the cause of his liver abnormalities.    His previous baseline kidney function had been 1.8-2 but has been running slightly higher at 1.8-2.3 in the past year.  His creatinine is 2.08 at this time.  He has been on Topamax for weight loss however without weight loss success.  His phosphorus was noted to be 1.6 at this time and he does report some muscle related weakness.  He is taking 1000 units of vitamin D daily.    03/23/20: no home BP readings at this time (no cuff). Clinic Readings have been less than 130/80. Weight was 252 lbs down to 233 lbs in Dec. Has gained some of that back but still kept most off. No NSAIDs. No swelling concerns. No longer on topamax.     09/22/20: video visit. Since last visit, he underwent kidney biopsy on 7/15/20 which showed:  FINAL DIAGNOSIS:   Native kidney biopsy with glomerulomegaly and nonspecific chronic   interstitial changes   No success with weight loss so far - more stress. We spent time discussing his biopsy findings. Overall doing well - no specific complaints at this time. No edema.        Past Medical History:   Diagnosis Date     Allergies     cats,dust, pollens     Depressive disorder 2017     Leukocytosis      Leukocytosis      Panic disorder without  agoraphobia      Uncomplicated asthma 1990    Diagnosed as a child      Past Surgical History:   Procedure Laterality Date     APPENDECTOMY  08/25/2004     C NONSPECIFIC PROCEDURE   Feb 2011     Laparoscopic cholecystectomy.     CHOLECYSTECTOMY       COLONOSCOPY N/A 11/14/2016    Procedure: COLONOSCOPY;  Surgeon: Mauro Tan MD;  Location:  GI     IR RENAL BIOPSY LEFT  7/15/2020     TONSILLECTOMY  06/08/2006          SOCIAL HISTORY:  +tobacco  He started smoking between ages [age]. Previous heavy alcohol abuse for which he went through treatment, he has been sober since 2009.  No drug use.  He currently works for a health insurance company.  He does not have children.      REVIEW OF SYSTEMS: 4 point ROS obtained, all pertinent positives and negatives in the HPI otherwise all other systems negative.      PHYSICAL EXAMINATION:   There were no vitals taken for this visit.   No exam - telephone encounter.     No visits with results within 1 Day(s) from this visit.   Latest known visit with results is:   Orders Only on 09/18/2020   Component Date Value Ref Range Status     Sodium 09/18/2020 139  133 - 144 mmol/L Final     Potassium 09/18/2020 4.1  3.4 - 5.3 mmol/L Final     Chloride 09/18/2020 107  94 - 109 mmol/L Final     Carbon Dioxide 09/18/2020 25  20 - 32 mmol/L Final     Anion Gap 09/18/2020 7  3 - 14 mmol/L Final     Glucose 09/18/2020 141* 70 - 99 mg/dL Final    Non Fasting     Urea Nitrogen 09/18/2020 29  7 - 30 mg/dL Final     Creatinine 09/18/2020 2.26* 0.66 - 1.25 mg/dL Final     GFR Estimate 09/18/2020 36* >60 mL/min/[1.73_m2] Final    Comment: Non  GFR Calc  Starting 12/18/2018, serum creatinine based estimated GFR (eGFR) will be   calculated using the Chronic Kidney Disease Epidemiology Collaboration   (CKD-EPI) equation.       GFR Estimate If Black 09/18/2020 41* >60 mL/min/[1.73_m2] Final    Comment:  GFR Calc  Starting 12/18/2018, serum creatinine based  "estimated GFR (eGFR) will be   calculated using the Chronic Kidney Disease Epidemiology Collaboration   (CKD-EPI) equation.       Calcium 09/18/2020 9.0  8.5 - 10.1 mg/dL Final     Phosphorus 09/18/2020 3.2  2.5 - 4.5 mg/dL Final     Albumin 09/18/2020 3.4  3.4 - 5.0 g/dL Final     Parathyroid Hormone Intact 09/18/2020 30  18 - 80 pg/mL Final     Hemoglobin 09/18/2020 15.3  13.3 - 17.7 g/dL Final     Protein Random Urine 09/18/2020 0.08  g/L Final     Protein Total Urine g/gr Creatinine 09/18/2020 0.16  0 - 0.2 g/g Cr Final     Creatinine Urine 09/18/2020 50  mg/dL Final      ASSESSMENT AND PLAN:   1.  CKD Stage 3:  Biopsy from July is consistent with nonspecific interstitial changes and glomerulomegaly. His biggest risk factor for kidney disease is related to long-standing NSAID use. He is now away from NSAIDs. Educated on the benefits of weight loss and blood pressure control. Will monitor routinely for now.     2.  QURESHI: Encouraged ongoing weight loss not only to help his kidney function/proteinuria but also avoid addt liver damage related to fatty liver disease.  Also encouraged avoidance of etoh. He has been seen by hepatology.     3. Prediabetes - followed by PCP    4. Obesity: has had success with weight loss previously - ongoing encouragement.       Patient Instructions   Follow-up in 6 months          MD Juan KOLBdrea Gracia is a 35 year old male who is being evaluated via a billable video visit.      The patient has been notified of following:     \"This video visit will be conducted via a call between you and your physician/provider. We have found that certain health care needs can be provided without the need for an in-person physical exam.  This service lets us provide the care you need with a video conversation.  If a prescription is necessary we can send it directly to your pharmacy.  If lab work is needed we can place an order for that and you can then stop by our lab to " "have the test done at a later time.    Video visits are billed at different rates depending on your insurance coverage.  Please reach out to your insurance provider with any questions.    If during the course of the call the physician/provider feels a video visit is not appropriate, you will not be charged for this service.\"    Patient has given verbal consent for Video visit? Yes  How would you like to obtain your AVS? MyChart  If you are dropped from the video visit, the video invite should be resent to: Text to cell phone: [phone]  Will anyone else be joining your video visit? No      Video-Visit Details    Type of service:  Video Visit    Video Start Time: 818 AM  Video End Time: 829 AM    Originating Location (pt. Location): Home    Distant Location (provider location):  [facility]     Platform used for Video Visit: Debbie Mena MD      "

## 2020-09-22 NOTE — LETTER
9/22/2020         RE: Abelino Gracia  [address] [id number]        Dear Colleague,    Thank you for referring your patient, Abelino Gracia, to the [facility]. Please see a copy of my visit note below.      Hand / Upper Extremity Injection/Arthrocentesis: L extensor compartment 1    Date/Time: 9/22/2020 10:52 AM  Performed by: Lauro Martinez MD  Authorized by: Lauro Martinez MD     Indications:  Pain  Needle Size:  25 G  Guidance: landmark    Condition: de Quervain's      Site:  L extensor compartment 1  Medications:  20 mg triamcinolone 40 MG/ML; 0.5 mL lidocaine 1 %  Outcome:  Tolerated well, no immediate complications  Procedure discussed: discussed risks, benefits, and alternatives    Consent Given by:  Patient  Timeout: timeout called immediately prior to procedure    Prep: patient was prepped and draped in usual sterile fashion            Abelino Gracia is a 35 year old male who is seen in consultation at the request of Ale Rodriguez  for left thumb and wrist pain.  This started about 2 months ago.  He thinks it is due to excessive aicha.  He now has occasional sharp pain rated 2 out of 10.  It is primarily over the radial aspect of the wrist and thumb.  He is used a thumb spica splint for 3 weeks.  It has helped a bit.  He cannot take anti-inflammatories because of chronic kidney disease.  He has increased pain with grabbing, twisting, putting pressure on the hand.  If he lets it rest it helps.  He is right-hand dominant.    Past Medical History:   Diagnosis Date     Allergies     cats,dust, pollens     Depressive disorder 2017     Leukocytosis      Leukocytosis      Panic disorder without agoraphobia      Uncomplicated asthma 1990    Diagnosed as a child       Past Surgical History:   Procedure Laterality Date     APPENDECTOMY  08/25/2004     C NONSPECIFIC PROCEDURE   Feb 2011     Laparoscopic cholecystectomy.      CHOLECYSTECTOMY       COLONOSCOPY N/A 2016    Procedure: COLONOSCOPY;  Surgeon: Mauro Tan MD;  Location: [facility]     IR RENAL BIOPSY LEFT  7/15/2020     TONSILLECTOMY  2006       Family History   Problem Relation Age of Onset     Allergies Mother      Allergies Brother      Heart Disease Maternal Grandfather      Hypertension Maternal Grandfather      Other Cancer Maternal Grandfather      Asthma Other      Cancer - colorectal Maternal Grandmother 80     Cancer Maternal Grandmother 40        uterine cancer     Kidney Disease Maternal Grandmother          of kidney failure - ~ age 80     Diabetes Maternal Grandmother      Colon Cancer Maternal Grandmother      Other Cancer Maternal Grandmother        Social History     Socioeconomic History     Marital status:      Spouse name: Not on file     Number of children: 0     Years of education: Not on file     Highest education level: Not on file   Occupational History     Employer: [employer]     Comment: clinical pharmacy [employer]   Social Needs     Financial resource strain: Not on file     Food insecurity     Worry: Not on file     Inability: Not on file     Transportation needs     Medical: Not on file     Non-medical: Not on file   Tobacco Use     Smoking status: Former Smoker     Packs/day: 1.00     Years: 20.00     Pack years: 20.00     Types: Cigarettes     Start date: 1998     Last attempt to quit: 3/5/2017     Years since quitting: 3.5     Smokeless tobacco: Never Used     Tobacco comment: Have tried chantix   Substance and Sexual Activity     Alcohol use: No     Alcohol/week: 0.0 standard drinks     Comment: SOBER SINCE 2009     Drug use: No     Sexual activity: Yes     Partners: Male     Comment: 1 partner   Lifestyle     Physical activity     Days per week: Not on file     Minutes per session: Not on file     Stress: Not on file   Relationships     Social connections     Talks on phone: Not  on file     Gets together: Not on file     Attends Advent service: Not on file     Active member of club or organization: Not on file     Attends meetings of clubs or organizations: Not on file     Relationship status: Not on file     Intimate partner violence     Fear of current or ex partner: Not on file     Emotionally abused: Not on file     Physically abused: Not on file     Forced sexual activity: Not on file   Other Topics Concern     Parent/sibling w/ CABG, MI or angioplasty before 65F 55M? No      Service No     Blood Transfusions No     Caffeine Concern No     Occupational Exposure No     Hobby Hazards No     Sleep Concern Yes     Stress Concern Yes     Weight Concern No     Special Diet No     Back Care No     Exercise No     Bike Helmet No     Seat Belt No     Self-Exams No   Social History Narrative     Not on file       Current Outpatient Medications   Medication Sig Dispense Refill     acetaminophen (TYLENOL) 325 MG tablet Take 325 mg by mouth as needed        albuterol (PROAIR HFA) 108 (90 Base) MCG/ACT inhaler Inhale 2 puffs into the lungs every 4 hours as needed 2 Inhaler 3     blood glucose monitoring (SARA CONTOUR MONITOR) meter device kit Use to test blood sugars one time daily. Strips Ex: 7/31/2019, Lot: [id number], SN: [id number] 1 kit 0     blood glucose monitoring (SARA CONTOUR) test strip Use to test blood sugars one time daily 50 strip 11     blood glucose monitoring (SARA MICROLET) lancets Use to test blood sugar 1 times daily 100 each 11     buPROPion (WELLBUTRIN XL) 300 MG 24 hr tablet Take 1 tablet (300 mg) by mouth daily To fill when current expires 90 tablet 3     Calcium Carbonate Antacid (TUMS PO) Take by mouth as needed       cetirizine (ZYRTEC) 10 MG tablet Take 10 mg by mouth daily as needed        diphenhydrAMINE (BENADRYL) 25 MG tablet Take 50 mg by mouth nightly as needed for itching or allergies       Fexofenadine HCl (ALLEGRA PO) Take 60 mg by mouth daily as  "needed        fluticasone (FLONASE) 50 MCG/ACT nasal spray Spray 1-2 sprays into both nostrils daily as needed  1 Package 11     liraglutide (VICTOZA) 18 MG/3ML solution Inject 0.6 mg Subcutaneous daily 9 mL 1     losartan (COZAAR) 25 MG tablet Take 0.5 tablets (12.5 mg) by mouth daily To fill when current expires 45 tablet 3     melatonin 3 MG tablet Take 6 mg by mouth nightly as needed for sleep        propranolol ER (INDERAL LA) 60 MG 24 hr capsule Take 1 capsule (60 mg) by mouth daily To fill when current expires 90 capsule 3     albuterol (PROVENTIL) (2.5 MG/3ML) 0.083% neb solution Take 1 vial (2.5 mg) by nebulization every 4 hours as needed for shortness of breath / dyspnea 300 mL 3       Allergies   Allergen Reactions     Clarithromycin Hives     Penicillins      Sulfa Drugs      Topamax [Topiramate]      Decreased gfr and decrease phosphorous with use     Adhesive Tape Rash       REVIEW OF SYSTEMS:  CONSTITUTIONAL:  NEGATIVE for fever, chills, change in weight, not feeling tired  SKIN:  NEGATIVE for worrisome rashes, no skin lumps, no skin ulcers and no non-healing wounds  EYES:  NEGATIVE for vision changes or irritation.  ENT/MOUTH:  NEGATIVE.  No hearing loss, no hoarseness, no difficulty swallowing.  RESP:  NEGATIVE. No cough or shortness of breath.  CV:  NEGATIVE for chest pain, palpitations or peripheral edema  GI:  NEGATIVE for nausea, abdominal pain, heartburn, or change in bowel habits  :  Negative. No dysuria, no hematuria  MUSCULOSKELETAL:  See HPI above  NEURO:  NEGATIVE . No headaches, no dizziness,  no numbness  ENDOCRINE:  NEGATIVE for temperature intolerance, skin/hair changes  HEME/ALLERGY/IMMUNE:  NEGATIVE for bleeding problems  PSYCHIATRIC:  NEGATIVE. no anxiety, no depression.     Exam:  Vitals: BP (!) 138/114   Pulse 80   Resp 14   Ht 1.778 m (5' 10\")   Wt 113.4 kg (250 lb)   BMI 35.87 kg/m    BMI= Body mass index is 35.87 kg/m .  Constitutional:  healthy, alert and no " distress  Neuro: Alert and Oriented x 3, Sensation grossly WNL.  Psych: Affect normal   Respiratory: Breathing not labored.  Cardiovascular: normal peripheral pulses  Lymph: no adenopathy  Skin: No rashes,worrisome lesions or skin problems  He has tenderness along the first dorsal compartment tendons left hand.  He has positive Finkelstein test.  No tenderness at the A1 pulley of the thumb.  No pain with grind test of the thumb CMC joint.  Sensation, motor and circulation are intact.    Assessment:  Left deQuervain's tenosynovitis.    Plan:  Splint to rest thumb.  He would like to proceed with injection.  With the patient's consent, I injected the left  thumb extensor tendon sheath with Kenalog 20 mg and lidocaine after sterile prep.    Return to clinic as needed.      Again, thank you for allowing me to participate in the care of your patient.        Sincerely,        Lauro Martinez MD

## 2020-10-01 DIAGNOSIS — N18.30 CKD (CHRONIC KIDNEY DISEASE) STAGE 3, GFR 30-59 ML/MIN (H): Primary | Chronic | ICD-10-CM

## 2020-10-19 ENCOUNTER — VIRTUAL VISIT (OUTPATIENT)
Dept: FAMILY MEDICINE | Facility: OTHER | Age: 35
End: 2020-10-19
Payer: COMMERCIAL

## 2020-10-19 PROCEDURE — 99421 OL DIG E/M SVC 5-10 MIN: CPT | Performed by: NURSE PRACTITIONER

## 2020-10-19 NOTE — PROGRESS NOTES
"Date: 10/19/2020 05:42:44  Clinician: Yojana Palomares  Clinician NPI: [id number]  Patient: Abelino Gracia  Patient : 1985  Patient Address: [address] [place] [address]  Patient Phone: [phone]  Visit Protocol: URI  Patient Summary:  Abelino is a 35 year old ( : 1985 ) male who initiated a [facility] Visit for COVID-19 (Coronavirus) evaluation and screening. When asked the question \"Please sign me up to receive news, health information and promotions from [facility].\", Abelino responded \"No\".    Abelino states his symptoms started suddenly 3-4 days ago.   His symptoms consist of wheezing, enlarged lymph nodes, a cough, malaise, and a sore throat. He is experiencing mild difficulty breathing with activities but can speak normally in full sentences.   Symptom details     Cough: Abelino coughs a few times an hour and his cough is not more bothersome at night. Phlegm does not come into his throat when he coughs. He does not believe his cough is caused by post-nasal drip.     Sore throat: Abelino reports having mild throat pain (1-3 on a 10 point pain scale), does not have exudate on his tonsils, and can swallow liquids. The lymph nodes in his neck are enlarged. A rash has not appeared on the skin since the sore throat started.     Wheezing: Abelino has been diagnosed with asthma. Additional wheezing details as reported by the patient (free text): Similar to how I felt when asthma would act up after a night of heavy cigarette smoking( I quit years ago)        Abelino denies having ear pain, headache, fever, nasal congestion, anosmia, vomiting, rhinitis, nausea, facial pain or pressure, myalgias, chills, teeth pain, ageusia, and diarrhea. He also denies double sickening (worsening symptoms after initial improvement), taking antibiotic medication in the past month, and having recent facial or sinus surgery in the past 60 days.   Precipitating events  " Within the past week, Abelino has not been exposed to someone with strep throat. He has not recently been exposed to someone with influenza. Abelino has not been in close contact with any high risk individuals.   Pertinent COVID-19 (Coronavirus) information  In the past 14 days, Abelino has not worked in a congregate living setting.   He does not work or volunteer as healthcare worker or a  and does not work or volunteer in a healthcare facility.   Abelino also has not lived in a congregate living setting in the past 14 days. He does not live with a healthcare worker.   Abelino has not had a close contact with a laboratory-confirmed COVID-19 patient within 14 days of symptom onset.   Since December 2019, Abelino and has not had upper respiratory infection or influenza-like illness. Has not been diagnosed with lab-confirmed COVID-19 test   Pertinent medical history  Abelino does not need a return to work/school note.   Weight: 250 lbs   Abelino does not smoke or use smokeless tobacco.   Weight: 250 lbs    MEDICATIONS: Victoza 3-Richard subcutaneous, losartan oral, Wellbutrin XL oral, propranolol oral, ALLERGIES: Penicillins, Topamax  Clinician Response:  Dear Abelino,   Your symptoms show that you may have coronavirus (COVID-19). This illness can cause fever, cough and trouble breathing. Many people get a mild case and get better on their own. Some people can get very sick.  Based on the symptoms you have shared, I would like you to be re-checked in 2 to 3 days. Please call your family clinic to set up a video or phone visit.  Will I be tested for COVID-19?  We would like to test you for this virus.   Please call [phone] to schedule your visit. Explain that you were referred by [facility] to have a COVID-19 test. Be ready to share your [facility] visit ID number.   The following will serve as your written order for this COVID Test, ordered by me, for the indication of  "suspected COVID [Z20.828]: The test will be ordered in [employer], our electronic health record, after you are scheduled. It will show as ordered and authorized by Vernon Clinton MD.  Order: COVID-19 (Coronavirus) PCR for SYMPTOMATIC [facility] from [facility].  1.When it's time for your COVID test:   Stay at least 6 feet away from others. (If someone will drive you to your test, stay in the backseat, as far away from the  as you can.)   Cover your mouth and nose with a mask, tissue or washcloth.  Go straight to the testing site. Don't make any stops on the way there or back.      2.Starting now: Stay home and away from others (self-isolate) until:   You've had no fever---and no medicine that reduces fever---for one full day (24 hours). And...   Your other symptoms have gotten better. For example, your cough or breathing has improved. And...   At least 10 days have passed since your symptoms started.       During this time, don't leave the house except for testing or medical care.   Stay in your own room, even for meals. Use your own bathroom if you can.   Stay away from others in your home. No hugging, kissing or shaking hands. No visitors.  Don't go to work, school or anywhere else.    Clean \"high touch\" surfaces often (doorknobs, counters, handles, etc.). Use a household cleaning spray or wipes. You'll find a full list of  on the EPA website: www.epa.gov/pesticide-registration/list-n-disinfectants-use-against-sars-cov-2.   Cover your mouth and nose with a mask, tissue or washcloth to avoid spreading germs.  Wash your hands and face often. Use soap and water.  Caregivers in these groups are at risk for severe illness due to COVID-19:  o People 65 years and older  o People who live in a nursing home or long-term care facility  o People with chronic disease (lung, heart, cancer, diabetes, kidney, liver, immunologic)   o People who have a weakened immune system, including those who:   Are in cancer treatment  Take " medicine that weakens the immune system, such as corticosteroids  Had a bone marrow or organ transplant  Have an immune deficiency  Have poorly controlled HIV or AIDS  Are obese (body mass index of 40 or higher)  Smoke regularly   o Caregivers should wear gloves while washing dishes, handling laundry and cleaning bedrooms and bathrooms.  o Use caution when washing and drying laundry: Don't shake dirty laundry, and use the warmest water setting that you can.  o For more tips, go to www.cdc.gov/coronavirus/2019-ncov/downloads/10Things.pdf.      How can I take care of myself?   Get lots of rest. Drink extra fluids (unless a doctor has told you not to)   Take Tylenol (acetaminophen) for fever or pain. If you have liver or kidney problems, ask your family doctor if it's okay to take Tylenol.   Adults can take either:    650 mg (two 325 mg pills) every 4 to 6 hours, or...   1,000 mg (two 500 mg pills) every 8 hours as needed.    Note: Don't take more than 3,000 mg in one day. Acetaminophen is found in many medicines (both prescribed and over-the-counter medicines). Read all labels to be sure you don't take too much.   For children, check the Tylenol bottle for the right dose. The dose is based on the child's age or weight.    If you have other health problems (like cancer, heart failure, an organ transplant or severe kidney disease): Call your specialty clinic if you don't feel better in the next 2 days.       Know when to call 911. Emergency warning signs include:    Trouble breathing or shortness of breath Pain or pressure in the chest that doesn't go away Feeling confused like you haven't felt before, or not being able to wake up Bluish-colored lips or face  Where can I get more information?    [facility] -- About COVID-19: [email or web address]   CDC -- What to Do If You're Sick: www.cdc.gov/coronavirus/2019-ncov/about/steps-when-sick.html   CDC -- Ending Home Isolation:  www.cdc.gov/coronavirus/2019-ncov/hcp/disposition-in-home-patients.html   [facility] -- Caring for Someone: www.cdc.gov/coronavirus/2019-ncov/if-you-are-sick/care-for-someone.html   [facility] -- Interim Guidance for Hospital Discharge to Home: www.[facility].mn.us/diseases/coronavirus/hcp/hospdischarge.pdf   [facility] clinical trials (COVID-19 research studies): clinicalaffairs.[facility]-clinical-trials    Below are the COVID-19 hotlines at the [place] Department of [facility] ([facility]). Interpreters are available.    For health questions: Call 636-715-9623 or 1-142.267.8696 (7 a.m. to 7 p.m.) For questions about schools and childcare: Call 539-266-8468 or 1-896.575.1587 (7 a.m. to 7 p.m.)       Diagnosis: Cough  Diagnosis ICD: R05

## 2020-10-20 DIAGNOSIS — Z20.822 SUSPECTED 2019 NOVEL CORONAVIRUS INFECTION: Primary | ICD-10-CM

## 2020-10-21 DIAGNOSIS — N18.30 CKD (CHRONIC KIDNEY DISEASE) STAGE 3, GFR 30-59 ML/MIN (H): ICD-10-CM

## 2020-10-21 DIAGNOSIS — N05.1 FSGS (FOCAL SEGMENTAL GLOMERULOSCLEROSIS): Primary | ICD-10-CM

## 2020-10-21 DIAGNOSIS — Z20.822 SUSPECTED 2019 NOVEL CORONAVIRUS INFECTION: ICD-10-CM

## 2020-10-21 PROCEDURE — U0003 INFECTIOUS AGENT DETECTION BY NUCLEIC ACID (DNA OR RNA); SEVERE ACUTE RESPIRATORY SYNDROME CORONAVIRUS 2 (SARS-COV-2) (CORONAVIRUS DISEASE [COVID-19]), AMPLIFIED PROBE TECHNIQUE, MAKING USE OF HIGH THROUGHPUT TECHNOLOGIES AS DESCRIBED BY CMS-2020-01-R: HCPCS | Performed by: FAMILY MEDICINE

## 2020-10-21 NOTE — TELEPHONE ENCOUNTER
"Requested Prescriptions   Pending Prescriptions Disp Refills     losartan (COZAAR) 25 MG tablet 45 tablet 3     Sig: Take 0.5 tablets (12.5 mg) by mouth daily To fill when current expires       Angiotensin-II Receptors Failed - 10/21/2020  2:04 PM        Failed - Last blood pressure under 140/90 in past 12 months     BP Readings from Last 3 Encounters:   09/22/20 (!) 138/114   08/28/20 128/75   07/15/20 120/78                 Failed - Normal serum creatinine on file in past 12 months     Recent Labs   Lab Test 09/18/20  1131   CR 2.26*       Ok to refill medication if creatinine is low          Passed - Recent (12 mo) or future (30 days) visit within the authorizing provider's specialty     Patient has had an office visit with the authorizing provider or a provider within the authorizing providers department within the previous 12 mos or has a future within next 30 days. See \"Patient Info\" tab in inbasket, or \"Choose Columns\" in Meds & Orders section of the refill encounter.              Passed - Medication is active on med list        Passed - Patient is age 18 or older        Passed - Normal serum potassium on file in past 12 months     Recent Labs   Lab Test 09/18/20  1131   POTASSIUM 4.1                       Routing refill request to provider for review/approval because:  Labs out of range:   BP's out of range    FRANSISCA Mann      "

## 2020-10-22 LAB
SARS-COV-2 RNA SPEC QL NAA+PROBE: NOT DETECTED
SPECIMEN SOURCE: NORMAL

## 2020-10-22 RX ORDER — LOSARTAN POTASSIUM 25 MG/1
12.5 TABLET ORAL DAILY
Qty: 45 TABLET | Refills: 3 | Status: SHIPPED | OUTPATIENT
Start: 2020-10-22 | End: 2022-01-03

## 2020-12-06 ENCOUNTER — HEALTH MAINTENANCE LETTER (OUTPATIENT)
Age: 35
End: 2020-12-06

## 2020-12-29 DIAGNOSIS — E11.9 TYPE 2 DIABETES, HBA1C GOAL < 8% (H): ICD-10-CM

## 2020-12-31 RX ORDER — LIRAGLUTIDE 6 MG/ML
INJECTION SUBCUTANEOUS
Qty: 9 ML | Refills: 0 | Status: SHIPPED | OUTPATIENT
Start: 2020-12-31 | End: 2021-04-01

## 2021-03-04 ENCOUNTER — OFFICE VISIT (OUTPATIENT)
Dept: FAMILY MEDICINE | Facility: CLINIC | Age: 36
End: 2021-03-04
Payer: COMMERCIAL

## 2021-03-04 VITALS — DIASTOLIC BLOOD PRESSURE: 82 MMHG | SYSTOLIC BLOOD PRESSURE: 132 MMHG

## 2021-03-04 DIAGNOSIS — F10.21 ALCOHOL DEPENDENCE IN REMISSION (H): ICD-10-CM

## 2021-03-04 DIAGNOSIS — E74.39 GLUCOSE INTOLERANCE: ICD-10-CM

## 2021-03-04 DIAGNOSIS — F32.5 MAJOR DEPRESSIVE DISORDER IN FULL REMISSION, UNSPECIFIED WHETHER RECURRENT (H): ICD-10-CM

## 2021-03-04 DIAGNOSIS — N25.81 SECONDARY RENAL HYPERPARATHYROIDISM (H): ICD-10-CM

## 2021-03-04 DIAGNOSIS — K75.81 NASH (NONALCOHOLIC STEATOHEPATITIS): Primary | Chronic | ICD-10-CM

## 2021-03-04 DIAGNOSIS — N18.32 STAGE 3B CHRONIC KIDNEY DISEASE (H): ICD-10-CM

## 2021-03-04 DIAGNOSIS — E66.01 MORBID OBESITY (H): ICD-10-CM

## 2021-03-04 LAB
ANION GAP SERPL CALCULATED.3IONS-SCNC: 6 MMOL/L (ref 3–14)
BUN SERPL-MCNC: 43 MG/DL (ref 7–30)
CALCIUM SERPL-MCNC: 9.6 MG/DL (ref 8.5–10.1)
CHLORIDE SERPL-SCNC: 107 MMOL/L (ref 94–109)
CO2 SERPL-SCNC: 25 MMOL/L (ref 20–32)
CREAT SERPL-MCNC: 2.15 MG/DL (ref 0.66–1.25)
CREAT UR-MCNC: 35 MG/DL
GFR SERPL CREATININE-BSD FRML MDRD: 38 ML/MIN/{1.73_M2}
GLUCOSE SERPL-MCNC: 98 MG/DL (ref 70–99)
HBA1C MFR BLD: 6 % (ref 0–5.6)
HGB BLD-MCNC: 15.8 G/DL (ref 13.3–17.7)
POTASSIUM SERPL-SCNC: 4.2 MMOL/L (ref 3.4–5.3)
PROT UR-MCNC: 0.08 G/L
PROT/CREAT 24H UR: 0.23 G/G CR (ref 0–0.2)
PTH-INTACT SERPL-MCNC: 63 PG/ML (ref 18–80)
SODIUM SERPL-SCNC: 138 MMOL/L (ref 133–144)

## 2021-03-04 PROCEDURE — 36415 COLL VENOUS BLD VENIPUNCTURE: CPT | Performed by: INTERNAL MEDICINE

## 2021-03-04 PROCEDURE — 85018 HEMOGLOBIN: CPT | Performed by: INTERNAL MEDICINE

## 2021-03-04 PROCEDURE — 84156 ASSAY OF PROTEIN URINE: CPT | Performed by: INTERNAL MEDICINE

## 2021-03-04 PROCEDURE — 99207 PR FOOT EXAM NO CHARGE: CPT | Performed by: INTERNAL MEDICINE

## 2021-03-04 PROCEDURE — 83036 HEMOGLOBIN GLYCOSYLATED A1C: CPT | Performed by: INTERNAL MEDICINE

## 2021-03-04 PROCEDURE — 80048 BASIC METABOLIC PNL TOTAL CA: CPT | Performed by: INTERNAL MEDICINE

## 2021-03-04 PROCEDURE — 83970 ASSAY OF PARATHORMONE: CPT | Performed by: INTERNAL MEDICINE

## 2021-03-04 PROCEDURE — 99213 OFFICE O/P EST LOW 20 MIN: CPT | Performed by: INTERNAL MEDICINE

## 2021-03-04 NOTE — PROGRESS NOTES
"Assessment & Plan   Problem List Items Addressed This Visit     Alcohol dependence in remission (H) (Chronic)    CKD (chronic kidney disease) stage 3, GFR 30-59 ml/min (Chronic)    Major depressive disorder    Morbid obesity (H)    QURESHI (nonalcoholic steatohepatitis) - Primary (Chronic)    Secondary renal hyperparathyroidism (H)      Other Visit Diagnoses     Glucose intolerance        Relevant Orders    FOOT EXAM (Completed)    EYE ADULT REFERRAL    Hemoglobin A1c (Completed)    Basic metabolic panel  (Ca, Cl, CO2, Creat, Gluc, K, Na, BUN) (Completed)         BP     132/82  3/7/2021    Lab Results   Component Value Date    GLC 98 03/04/2021     Lab Results   Component Value Date    A1C 6.0 03/04/2021     Lab Results   Component Value Date    LDL 96 08/28/2020     Lab Results   Component Value Date    MICROL 12 02/25/2019     No results found for: MICROALBUMIN             BMI:   Estimated body mass index is 35.87 kg/m  as calculated from the following:    Height as of 9/22/20: 1.778 m (5' 10\").    Weight as of 9/22/20: 113.4 kg (250 lb).   Weight management plan: Discussed healthy diet and exercise guidelines    Work on weight loss  Regular exercise    No follow-ups on file.    Roberth Tavares MD  [facility] PETTY Roca is a 36 year old who presents for the following health issues     HPI       Diabetes Follow-up    How often are you checking your blood sugar? A few times a month  What time of day are you checking your blood sugars (select all that apply)?  Not applicable  Have you had any blood sugars above 200?  No  Have you had any blood sugars below 70?  No    What symptoms do you notice when your blood sugar is low?  None    What concerns do you have today about your diabetes? None     Do you have any of these symptoms? (Select all that apply)  No numbness or tingling in feet.  No redness, sores or blisters on feet.  No complaints of excessive thirst.  No reports of blurry " vision.  No significant changes to weight.    Have you had a diabetic eye exam in the last 12 months? No    Health stable   and weight stable   237-238 at home .  Not checking as much after keto diet ended   109 ( feb 1 st)        BP Readings from Last 2 Encounters:   03/04/21 132/82   09/22/20 (!) 138/114     Hemoglobin A1C (%)   Date Value   03/04/2021 6.0 (H)   08/28/2020 6.4 (H)     LDL Cholesterol Calculated (mg/dL)   Date Value   08/28/2020 96   07/30/2019 86                 How many servings of fruits and vegetables do you eat daily?  2-3    On average, how many sweetened beverages do you drink each day (Examples: soda, juice, sweet tea, etc.  Do NOT count diet or artificially sweetened beverages)?   0    How many days per week do you exercise enough to make your heart beat faster?     How many minutes a day do you exercise enough to make your heart beat faster?     How many days per week do you miss taking your medication? 0        Review of Systems   Constitutional, HEENT, cardiovascular, pulmonary, gi and gu systems are negative, except as otherwise noted.      Objective    /82   There is no height or weight on file to calculate BMI.  Physical Exam   GENERAL: healthy, alert and no distress  EYES: Eyes grossly normal to inspection, PERRL and conjunctivae and sclerae normal  HENT: ear canals and TM's normal, nose and mouth without ulcers or lesions  NECK: no adenopathy, no asymmetry, masses, or scars and thyroid normal to palpation  RESP: lungs clear to auscultation - no rales, rhonchi or wheezes  CV: regular rate and rhythm, normal S1 S2, no S3 or S4, no murmur, click or rub, no peripheral edema and peripheral pulses strong  ABDOMEN: soft, nontender, no hepatosplenomegaly, no masses and bowel sounds normal  MS: no gross musculoskeletal defects noted, no edema  SKIN: no suspicious lesions or rashes  NEURO: Normal strength and tone, mentation intact and speech normal  BACK: no CVA tenderness, no  paralumbar tenderness  PSYCH: mentation appears normal, affect normal/bright  LYMPH: no cervical, supraclavicular, axillary, or inguinal adenopathy    Results for orders placed or performed in visit on 03/04/21   Hemoglobin A1c     Status: Abnormal   Result Value Ref Range    Hemoglobin A1C 6.0 (H) 0 - 5.6 %   Basic metabolic panel  (Ca, Cl, CO2, Creat, Gluc, K, Na, BUN)     Status: Abnormal   Result Value Ref Range    Sodium 138 133 - 144 mmol/L    Potassium 4.2 3.4 - 5.3 mmol/L    Chloride 107 94 - 109 mmol/L    Carbon Dioxide 25 20 - 32 mmol/L    Anion Gap 6 3 - 14 mmol/L    Glucose 98 70 - 99 mg/dL    Urea Nitrogen 43 (H) 7 - 30 mg/dL    Creatinine 2.15 (H) 0.66 - 1.25 mg/dL    GFR Estimate 38 (L) >60 mL/min/[1.73_m2]    GFR Estimate If Black 44 (L) >60 mL/min/[1.73_m2]    Calcium 9.6 8.5 - 10.1 mg/dL   Parathyroid Hormone Intact     Status: None   Result Value Ref Range    Parathyroid Hormone Intact 63 18 - 80 pg/mL   Hemoglobin     Status: None   Result Value Ref Range    Hemoglobin 15.8 13.3 - 17.7 g/dL   Protein  random urine with Creat Ratio     Status: Abnormal   Result Value Ref Range    Protein Random Urine 0.08 g/L    Protein Total Urine g/gr Creatinine 0.23 (H) 0 - 0.2 g/g Cr   Creatinine urine calculation only     Status: None   Result Value Ref Range    Creatinine Urine 35 mg/dL                Principal Discharge DX:	Term birth of female   Goal:	full recovery  Assessment and plan of treatment:	No heavy lifting  Pelvic rest x 6wks  Regular diet

## 2021-03-19 ENCOUNTER — IMMUNIZATION (OUTPATIENT)
Dept: NURSING | Facility: CLINIC | Age: 36
End: 2021-03-19
Payer: COMMERCIAL

## 2021-03-19 PROCEDURE — 0001A PR COVID VAC PFIZER DIL RECON 30 MCG/0.3 ML IM: CPT

## 2021-03-19 PROCEDURE — 91300 PR COVID VAC PFIZER DIL RECON 30 MCG/0.3 ML IM: CPT

## 2021-03-22 ENCOUNTER — VIRTUAL VISIT (OUTPATIENT)
Dept: NEPHROLOGY | Facility: CLINIC | Age: 36
End: 2021-03-22
Payer: COMMERCIAL

## 2021-03-22 DIAGNOSIS — N18.31 STAGE 3A CHRONIC KIDNEY DISEASE (H): Primary | ICD-10-CM

## 2021-03-22 PROCEDURE — 99214 OFFICE O/P EST MOD 30 MIN: CPT | Mod: 95 | Performed by: INTERNAL MEDICINE

## 2021-03-22 NOTE — PROGRESS NOTES
Abelino is a 36 year old who is being evaluated via a billable video visit.            Video-Visit Details    Type of service:  Video Visit

## 2021-03-22 NOTE — PROGRESS NOTES
03/22/21    HISTORY OF PRESENT ILLNESS:  Abelino Gracia is a 35 year-old male who has had elevated creatinine levels dating back to 2006 according to [facility] records.       History taken from previous notes: His creatinine has ranged anywhere from 1.3-1.7 in his [facility] records.  He was seen by nephrology on one occasion while inpatient at [facility] in 2009 and his kidney injury at that time was felt to be related to long-standing heavy use of NSAIDs.  To briefly summarize risk factors for CKD: heavy NSAID use in the past, previous etoh abuse (sober since 09). Addt hx includes DESHAUN. His serologic workup including C3, C4, ANCA, Anti-GBM, and immunofixations were normal. He is felt to have QURESHI as the cause of his liver abnormalities.               His previous baseline kidney function had been 1.8-2 but has been running slightly higher at 1.8-2.3 in the past year.  His creatinine is 2.08 at this time.  He has been on Topamax for weight loss however without weight loss success.  His phosphorus was noted to be 1.6 at this time and he does report some muscle related weakness.  He is taking 1000 units of vitamin D daily.     03/23/20: no home BP readings at this time (no cuff). Clinic Readings have been less than 130/80. Weight was 252 lbs down to 233 lbs in Dec. Has gained some of that back but still kept most off. No NSAIDs. No swelling concerns. No longer on topamax.      09/22/20: video visit. Since last visit, he underwent kidney biopsy on 7/15/20 which showed:  FINAL DIAGNOSIS:   Native kidney biopsy with glomerulomegaly and nonspecific chronic   interstitial changes   No success with weight loss so far - more stress. We spent time discussing his biopsy findings. Overall doing well - no specific complaints at this time. No edema.     03/22/21: video visit. GFR 36-39 for the past year. Only minimal proteinuria. On losartan 12.5 mg daily. Feeling more cold in his hands and feet. No constipation/diarrhea. No numbness  /tingling. BP less than 130 when he checks it but on/off with checking it at home. He has received first dose of the vaccine. No swelling. Working from home.        acetaminophen (TYLENOL) 325 MG tablet, Take 325 mg by mouth as needed   albuterol (PROAIR HFA) 108 (90 Base) MCG/ACT inhaler, Inhale 2 puffs into the lungs every 4 hours as needed  buPROPion (WELLBUTRIN XL) 300 MG 24 hr tablet, Take 1 tablet (300 mg) by mouth daily To fill when current expires  Calcium Carbonate Antacid (TUMS PO), Take by mouth as needed  cetirizine (ZYRTEC) 10 MG tablet, Take 10 mg by mouth daily as needed   diphenhydrAMINE (BENADRYL) 25 MG tablet, Take 50 mg by mouth nightly as needed for itching or allergies  Fexofenadine HCl (ALLEGRA PO), Take 60 mg by mouth daily as needed   fluticasone (FLONASE) 50 MCG/ACT nasal spray, Spray 1-2 sprays into both nostrils daily as needed   losartan (COZAAR) 25 MG tablet, Take 0.5 tablets (12.5 mg) by mouth daily To fill when current expires  melatonin 3 MG tablet, Take 6 mg by mouth nightly as needed for sleep   propranolol ER (INDERAL LA) 60 MG 24 hr capsule, Take 1 capsule (60 mg) by mouth daily To fill when current expires  VICTOZA PEN 18 MG/3ML soln, ADMINISTER 0.6 MG UNDER THE SKIN DAILY  blood glucose monitoring (SARA CONTOUR MONITOR) meter device kit, Use to test blood sugars one time daily. Strips Ex: 7/31/2019, Lot: [id number], SN: [id number]  blood glucose monitoring (SARA CONTOUR) test strip, Use to test blood sugars one time daily  blood glucose monitoring (SARA MICROLET) lancets, Use to test blood sugar 1 times daily    lidocaine 1 % injection 0.5 mL  triamcinolone (KENALOG-40) injection 20 mg        Exam:  There were no vitals taken for this visit.  GENERAL APPEARANCE: alert and no distress    Results:    No visits with results within 1 Day(s) from this visit.   Latest known visit with results is:   Office Visit on 03/04/2021   Component Date Value Ref Range Status     Hemoglobin  A1C 03/04/2021 6.0* 0 - 5.6 % Final    Comment: Normal <5.7% Prediabetes 5.7-6.4%  Diabetes 6.5% or higher - adopted from ADA   consensus guidelines.       Sodium 03/04/2021 138  133 - 144 mmol/L Final     Potassium 03/04/2021 4.2  3.4 - 5.3 mmol/L Final     Chloride 03/04/2021 107  94 - 109 mmol/L Final     Carbon Dioxide 03/04/2021 25  20 - 32 mmol/L Final     Anion Gap 03/04/2021 6  3 - 14 mmol/L Final     Glucose 03/04/2021 98  70 - 99 mg/dL Final     Urea Nitrogen 03/04/2021 43* 7 - 30 mg/dL Final     Creatinine 03/04/2021 2.15* 0.66 - 1.25 mg/dL Final     GFR Estimate 03/04/2021 38* >60 mL/min/[1.73_m2] Final    Comment: Non  GFR Calc  Starting 12/18/2018, serum creatinine based estimated GFR (eGFR) will be   calculated using the Chronic Kidney Disease Epidemiology Collaboration   (CKD-EPI) equation.       GFR Estimate If Black 03/04/2021 44* >60 mL/min/[1.73_m2] Final    Comment:  GFR Calc  Starting 12/18/2018, serum creatinine based estimated GFR (eGFR) will be   calculated using the Chronic Kidney Disease Epidemiology Collaboration   (CKD-EPI) equation.       Calcium 03/04/2021 9.6  8.5 - 10.1 mg/dL Final     Parathyroid Hormone Intact 03/04/2021 63  18 - 80 pg/mL Final     Hemoglobin 03/04/2021 15.8  13.3 - 17.7 g/dL Final     Protein Random Urine 03/04/2021 0.08  g/L Final     Protein Total Urine g/gr Creatinine 03/04/2021 0.23* 0 - 0.2 g/g Cr Final     Creatinine Urine 03/04/2021 35  mg/dL Final      Lab results were reviewed and interpreted.       Assessment/Plan:   1.  CKD Stage 3:  Biopsy from July 2020 is consistent with nonspecific interstitial changes and glomerulomegaly. His biggest risk factor for kidney disease is related to long-standing NSAID use. He is now away from NSAIDs. Educated on the benefits of weight loss and blood pressure control. Will monitor routinely for now. On losartan 12.5 mg daily for protein lowering effect.      2.  QURESHI: Encouraged ongoing  weight loss not only to help his kidney function/proteinuria but also avoid addt liver damage related to fatty liver disease.  Also encouraged avoidance of etoh. He has been seen by hepatology.      3. Prediabetes - followed by PCP - last A1C 6%.      4. Obesity: has had success with weight loss previously - ongoing encouragement.      Patient Instructions   1. Labs in 6 months  2. Follow-up in one year.        804 AM to 813 AM  Amina Mena,

## 2021-03-22 NOTE — NURSING NOTE
Called and spoke with pt assisted with scheduling Future appts recommended by Dr. Mena:    Instructions:  1. Labs in 6 months  2. Follow-up in one year.      Encouraged pt to MyChart/call if any further questions or concerns.    Irene Ramirez LPN

## 2021-03-30 DIAGNOSIS — E11.9 TYPE 2 DIABETES, HBA1C GOAL < 8% (H): ICD-10-CM

## 2021-04-01 RX ORDER — LIRAGLUTIDE 6 MG/ML
INJECTION SUBCUTANEOUS
Qty: 9 ML | Refills: 0 | Status: SHIPPED | OUTPATIENT
Start: 2021-04-01 | End: 2021-04-05

## 2021-04-01 NOTE — TELEPHONE ENCOUNTER
Prescription approved per [facility] Refill Protocol.  Yeny Duong RN

## 2021-04-02 DIAGNOSIS — E11.9 TYPE 2 DIABETES, HBA1C GOAL < 8% (H): ICD-10-CM

## 2021-04-05 RX ORDER — LIRAGLUTIDE 6 MG/ML
INJECTION SUBCUTANEOUS
Qty: 9 ML | Refills: 0 | Status: SHIPPED | OUTPATIENT
Start: 2021-04-05 | End: 2021-09-29

## 2021-04-05 NOTE — TELEPHONE ENCOUNTER
Routing refill request to provider for review/approval because:  Labs out of range:    Creatinine   Date Value Ref Range Status   03/04/2021 2.15 (H) 0.66 - 1.25 mg/dL Final

## 2021-04-09 ENCOUNTER — IMMUNIZATION (OUTPATIENT)
Dept: NURSING | Facility: CLINIC | Age: 36
End: 2021-04-09
Attending: FAMILY MEDICINE
Payer: COMMERCIAL

## 2021-04-09 PROCEDURE — 91300 PR COVID VAC PFIZER DIL RECON 30 MCG/0.3 ML IM: CPT

## 2021-04-09 PROCEDURE — 0002A PR COVID VAC PFIZER DIL RECON 30 MCG/0.3 ML IM: CPT

## 2021-06-06 ENCOUNTER — HEALTH MAINTENANCE LETTER (OUTPATIENT)
Age: 36
End: 2021-06-06

## 2021-07-12 ENCOUNTER — MYC MEDICAL ADVICE (OUTPATIENT)
Dept: FAMILY MEDICINE | Facility: CLINIC | Age: 36
End: 2021-07-12

## 2021-07-12 DIAGNOSIS — D72.829 LEUKOCYTOSIS, UNSPECIFIED TYPE: ICD-10-CM

## 2021-07-12 DIAGNOSIS — Z13.6 CARDIOVASCULAR SCREENING; LDL GOAL LESS THAN 160: ICD-10-CM

## 2021-07-12 DIAGNOSIS — N18.32 STAGE 3B CHRONIC KIDNEY DISEASE (H): Primary | ICD-10-CM

## 2021-07-12 DIAGNOSIS — E83.39 HYPOPHOSPHATEMIA: ICD-10-CM

## 2021-07-12 DIAGNOSIS — K75.81 NASH (NONALCOHOLIC STEATOHEPATITIS): ICD-10-CM

## 2021-07-12 DIAGNOSIS — E55.9 VITAMIN D DEFICIENCY: ICD-10-CM

## 2021-07-12 DIAGNOSIS — G60.3 IDIOPATHIC PROGRESSIVE NEUROPATHY: ICD-10-CM

## 2021-07-15 ENCOUNTER — ANCILLARY PROCEDURE (OUTPATIENT)
Dept: GENERAL RADIOLOGY | Facility: CLINIC | Age: 36
End: 2021-07-15
Attending: INTERNAL MEDICINE
Payer: COMMERCIAL

## 2021-07-15 ENCOUNTER — OFFICE VISIT (OUTPATIENT)
Dept: FAMILY MEDICINE | Facility: CLINIC | Age: 36
End: 2021-07-15
Payer: COMMERCIAL

## 2021-07-15 VITALS
TEMPERATURE: 98.4 F | OXYGEN SATURATION: 98 % | SYSTOLIC BLOOD PRESSURE: 133 MMHG | WEIGHT: 248 LBS | HEART RATE: 80 BPM | DIASTOLIC BLOOD PRESSURE: 81 MMHG | RESPIRATION RATE: 16 BRPM | BODY MASS INDEX: 35.58 KG/M2

## 2021-07-15 DIAGNOSIS — M25.561 ARTHRALGIA OF BOTH KNEES: ICD-10-CM

## 2021-07-15 DIAGNOSIS — E74.39 GLUCOSE INTOLERANCE: ICD-10-CM

## 2021-07-15 DIAGNOSIS — N18.32 STAGE 3B CHRONIC KIDNEY DISEASE (H): ICD-10-CM

## 2021-07-15 DIAGNOSIS — M25.562 ARTHRALGIA OF BOTH KNEES: ICD-10-CM

## 2021-07-15 DIAGNOSIS — K75.81 NASH (NONALCOHOLIC STEATOHEPATITIS): Primary | ICD-10-CM

## 2021-07-15 DIAGNOSIS — G63 POLYNEUROPATHY ASSOCIATED WITH UNDERLYING DISEASE (H): ICD-10-CM

## 2021-07-15 LAB
ALBUMIN SERPL-MCNC: 3.8 G/DL (ref 3.4–5)
ALP SERPL-CCNC: 116 U/L (ref 40–150)
ALT SERPL W P-5'-P-CCNC: 67 U/L (ref 0–70)
ANION GAP SERPL CALCULATED.3IONS-SCNC: 7 MMOL/L (ref 3–14)
AST SERPL W P-5'-P-CCNC: 32 U/L (ref 0–45)
BILIRUB SERPL-MCNC: 0.4 MG/DL (ref 0.2–1.3)
BUN SERPL-MCNC: 40 MG/DL (ref 7–30)
CALCIUM SERPL-MCNC: 9.1 MG/DL (ref 8.5–10.1)
CHLORIDE BLD-SCNC: 108 MMOL/L (ref 94–109)
CO2 SERPL-SCNC: 22 MMOL/L (ref 20–32)
CREAT SERPL-MCNC: 2.21 MG/DL (ref 0.66–1.25)
ERYTHROCYTE [SEDIMENTATION RATE] IN BLOOD BY WESTERGREN METHOD: 9 MM/HR (ref 0–15)
GFR SERPL CREATININE-BSD FRML MDRD: 36 ML/MIN/1.73M2
GLUCOSE BLD-MCNC: 110 MG/DL (ref 70–99)
HBA1C MFR BLD: 6.1 % (ref 0–5.6)
POTASSIUM BLD-SCNC: 4 MMOL/L (ref 3.4–5.3)
PROT SERPL-MCNC: 7.7 G/DL (ref 6.8–8.8)
SODIUM SERPL-SCNC: 137 MMOL/L (ref 133–144)
TSH SERPL DL<=0.005 MIU/L-ACNC: 0.7 MU/L (ref 0.4–4)
VIT B12 SERPL-MCNC: 851 PG/ML (ref 193–986)

## 2021-07-15 PROCEDURE — 83036 HEMOGLOBIN GLYCOSYLATED A1C: CPT | Performed by: INTERNAL MEDICINE

## 2021-07-15 PROCEDURE — 73562 X-RAY EXAM OF KNEE 3: CPT | Mod: RT | Performed by: RADIOLOGY

## 2021-07-15 PROCEDURE — 36415 COLL VENOUS BLD VENIPUNCTURE: CPT | Performed by: INTERNAL MEDICINE

## 2021-07-15 PROCEDURE — 99214 OFFICE O/P EST MOD 30 MIN: CPT | Performed by: INTERNAL MEDICINE

## 2021-07-15 PROCEDURE — 80053 COMPREHEN METABOLIC PANEL: CPT | Performed by: INTERNAL MEDICINE

## 2021-07-15 PROCEDURE — 82607 VITAMIN B-12: CPT | Performed by: INTERNAL MEDICINE

## 2021-07-15 PROCEDURE — 84443 ASSAY THYROID STIM HORMONE: CPT | Performed by: INTERNAL MEDICINE

## 2021-07-15 PROCEDURE — 73562 X-RAY EXAM OF KNEE 3: CPT | Mod: 59 | Performed by: RADIOLOGY

## 2021-07-15 PROCEDURE — 85652 RBC SED RATE AUTOMATED: CPT | Performed by: INTERNAL MEDICINE

## 2021-07-15 RX ORDER — GABAPENTIN 100 MG/1
100-600 CAPSULE ORAL 3 TIMES DAILY
Qty: 150 CAPSULE | Refills: 3 | Status: SHIPPED | OUTPATIENT
Start: 2021-07-15 | End: 2021-08-17

## 2021-07-15 ASSESSMENT — ANXIETY QUESTIONNAIRES
IF YOU CHECKED OFF ANY PROBLEMS ON THIS QUESTIONNAIRE, HOW DIFFICULT HAVE THESE PROBLEMS MADE IT FOR YOU TO DO YOUR WORK, TAKE CARE OF THINGS AT HOME, OR GET ALONG WITH OTHER PEOPLE: NOT DIFFICULT AT ALL
GAD7 TOTAL SCORE: 3
1. FEELING NERVOUS, ANXIOUS, OR ON EDGE: NOT AT ALL
2. NOT BEING ABLE TO STOP OR CONTROL WORRYING: SEVERAL DAYS
6. BECOMING EASILY ANNOYED OR IRRITABLE: MORE THAN HALF THE DAYS
3. WORRYING TOO MUCH ABOUT DIFFERENT THINGS: NOT AT ALL
5. BEING SO RESTLESS THAT IT IS HARD TO SIT STILL: NOT AT ALL
7. FEELING AFRAID AS IF SOMETHING AWFUL MIGHT HAPPEN: NOT AT ALL

## 2021-07-15 ASSESSMENT — PATIENT HEALTH QUESTIONNAIRE - PHQ9
SUM OF ALL RESPONSES TO PHQ QUESTIONS 1-9: 7
5. POOR APPETITE OR OVEREATING: NOT AT ALL

## 2021-07-15 ASSESSMENT — PAIN SCALES - GENERAL: PAINLEVEL: NO PAIN (0)

## 2021-07-15 NOTE — PROGRESS NOTES
Assessment & Plan   Problem List Items Addressed This Visit     CKD (chronic kidney disease) stage 3, GFR 30-59 ml/min (Chronic)    QURESHI (nonalcoholic steatohepatitis) - Primary (Chronic)    Relevant Orders    Comprehensive metabolic panel (BMP + Alb, Alk Phos, ALT, AST, Total. Bili, TP) (Completed)    Hemoglobin A1c (Completed)      Other Visit Diagnoses     Polyneuropathy associated with underlying disease (H)        Relevant Medications    gabapentin (NEURONTIN) 100 MG capsule    Other Relevant Orders    Vitamin B12 (Completed)    ESR: Erythrocyte sedimentation rate (Completed)    Glucose intolerance        Relevant Orders    OPTOMETRY REFERRAL    TSH with free T4 reflex (Completed)    Arthralgia of both knees        Relevant Orders    REVIEW OF HEALTH MAINTENANCE PROTOCOL ORDERS (Completed)    XR Knee Right 3 Views (Completed)    XR Knee Left 3 Views (Completed)    KEENAN PT and Hand Referral                  Work on weight loss  Regular exercise    No follow-ups on file.    Roberth Tavares MD  [facility] PETTY Rascon is a 36 year old who presents for the following health issues   HPI   Patient wants to discuss tingling in hands that started a few months ago also his knees   Diabetes Follow-up    How often are you checking your blood sugar? Not at all    What concerns do you have today about your diabetes? None     Do you have any of these symptoms? (Select all that apply)  No numbness or tingling in feet.  No redness, sores or blisters on feet.  No complaints of excessive thirst.  No reports of blurry vision.  No significant changes to weight.    Have you had a diabetic eye exam in the last 12 months? No  BP Readings from Last 2 Encounters:   07/15/21 133/81   03/04/21 132/82     Hemoglobin A1C (%)   Date Value   07/15/2021 6.1 (H)   03/04/2021 6.0 (H)   08/28/2020 6.4 (H)     LDL Cholesterol Calculated (mg/dL)   Date Value   08/28/2020 96   07/30/2019 86       Knees popping and  grinding in both knees,  Has been present before but acutely worse after moving   aggrevated by going up and down stairs particularly.    No neck stiffness,   Sleep has been problematic- insomnia present  Melatonin and benadryl                How many servings of fruits and vegetables do you eat daily?  2-3    On average, how many sweetened beverages do you drink each day (Examples: soda, juice, sweet tea, etc.  Do NOT count diet or artificially sweetened beverages)?   0 - diet soda     How many days per week do you exercise enough to make your heart beat faster? 3 or less    How many minutes a day do you exercise enough to make your heart beat faster? 9 or less    How many days per week do you miss taking your medication? 0        Review of Systems   Constitutional, HEENT, cardiovascular, pulmonary, gi and gu systems are negative, except as otherwise noted.      Objective    /81 (BP Location: Right arm)   Pulse 80   Temp 98.4  F (36.9  C) (Oral)   Resp 16   Wt 112.5 kg (248 lb)   SpO2 98%   BMI 35.58 kg/m    Body mass index is 35.58 kg/m .  Physical Exam   GENERAL: healthy, alert and no distress  EYES: Eyes grossly normal to inspection, PERRL and conjunctivae and sclerae normal  HENT: ear canals and TM's normal, nose and mouth without ulcers or lesions  NECK: no adenopathy, no asymmetry, masses, or scars and thyroid normal to palpation  RESP: lungs clear to auscultation - no rales, rhonchi or wheezes  CV: regular rate and rhythm, normal S1 S2, no S3 or S4, no murmur, click or rub, no peripheral edema and peripheral pulses strong  ABDOMEN: soft, nontender, no hepatosplenomegaly, no masses and bowel sounds normal  MS: no gross musculoskeletal defects noted, no edema  SKIN: no suspicious lesions or rashes  NEURO: Normal strength and tone, mentation intact and speech normal  BACK: no CVA tenderness, no paralumbar tenderness  PSYCH: mentation appears normal, affect normal/bright    Results for orders placed  or performed in visit on 07/15/21   XR Knee Left 3 Views     Status: None    Narrative    XR KNEE LEFT 3 VIEWS  7/15/2021 3:17 PM     HISTORY: Arthralgia of both knees; Arthralgia of both knees    COMPARISON: None.      Impression    IMPRESSION:  Minimal hypertrophic change in the medial and  patellofemoral compartments. No joint space narrowing. Normal patellar  alignment. No knee joint effusion.     GITA BOSCH MD         SYSTEM ID:  [id number]   Results for orders placed or performed in visit on 07/15/21   XR Knee Right 3 Views     Status: None    Narrative    XR KNEE RIGHT 3 VIEWS  7/15/2021 3:17 PM     HISTORY: Arthralgia of both knees; Arthralgia of both knees    COMPARISON: None.      Impression    IMPRESSION:  Minimal hypertrophic change of the patellofemoral  compartment. No joint space narrowing. Normal patellar alignment. No  knee joint effusion.     GITA BOSCH MD         SYSTEM ID:  [id number]   Results for orders placed or performed in visit on 07/15/21   Hemoglobin A1c     Status: Abnormal   Result Value Ref Range    Hemoglobin A1C 6.1 (H) 0.0 - 5.6 %   ESR: Erythrocyte sedimentation rate     Status: Normal   Result Value Ref Range    Erythrocyte Sedimentation Rate 9 0 - 15 mm/hr   Vitamin B12     Status: Normal   Result Value Ref Range    Vitamin B12 851 193 - 986 pg/mL   TSH with free T4 reflex     Status: Normal   Result Value Ref Range    TSH 0.70 0.40 - 4.00 mU/L   Comprehensive metabolic panel (BMP + Alb, Alk Phos, ALT, AST, Total. Bili, TP)     Status: Abnormal   Result Value Ref Range    Sodium 137 133 - 144 mmol/L    Potassium 4.0 3.4 - 5.3 mmol/L    Chloride 108 94 - 109 mmol/L    Carbon Dioxide (CO2) 22 20 - 32 mmol/L    Anion Gap 7 3 - 14 mmol/L    Urea Nitrogen 40 (H) 7 - 30 mg/dL    Creatinine 2.21 (H) 0.66 - 1.25 mg/dL    Calcium 9.1 8.5 - 10.1 mg/dL    Glucose 110 (H) 70 - 99 mg/dL    Alkaline Phosphatase 116 40 - 150 U/L    AST 32 0 - 45 U/L    ALT 67 0 - 70 U/L    Protein  Total 7.7 6.8 - 8.8 g/dL    Albumin 3.8 3.4 - 5.0 g/dL    Bilirubin Total 0.4 0.2 - 1.3 mg/dL    GFR Estimate 36 (L) >60 mL/min/1.73m2

## 2021-07-16 ASSESSMENT — ANXIETY QUESTIONNAIRES: GAD7 TOTAL SCORE: 3

## 2021-07-16 ASSESSMENT — ASTHMA QUESTIONNAIRES: ACT_TOTALSCORE: 25

## 2021-07-23 DIAGNOSIS — F41.9 MODERATE ANXIETY: ICD-10-CM

## 2021-07-26 RX ORDER — BUPROPION HYDROCHLORIDE 300 MG/1
TABLET ORAL
Qty: 90 TABLET | Refills: 3 | Status: SHIPPED | OUTPATIENT
Start: 2021-07-26 | End: 2022-07-29

## 2021-07-28 ENCOUNTER — THERAPY VISIT (OUTPATIENT)
Dept: PHYSICAL THERAPY | Facility: CLINIC | Age: 36
End: 2021-07-28
Attending: INTERNAL MEDICINE
Payer: COMMERCIAL

## 2021-07-28 DIAGNOSIS — M25.561 ARTHRALGIA OF BOTH KNEES: Primary | ICD-10-CM

## 2021-07-28 DIAGNOSIS — M25.562 ARTHRALGIA OF BOTH KNEES: Primary | ICD-10-CM

## 2021-07-28 PROCEDURE — 97161 PT EVAL LOW COMPLEX 20 MIN: CPT | Mod: GP | Performed by: PHYSICAL THERAPIST

## 2021-07-28 PROCEDURE — 97110 THERAPEUTIC EXERCISES: CPT | Mod: GP | Performed by: PHYSICAL THERAPIST

## 2021-07-28 ASSESSMENT — ACTIVITIES OF DAILY LIVING (ADL)
GO DOWN STAIRS: ACTIVITY IS NOT DIFFICULT
KNEE_ACTIVITY_OF_DAILY_LIVING_SCORE: 74.29
PAIN: THE SYMPTOM AFFECTS MY ACTIVITY SLIGHTLY
WEAKNESS: THE SYMPTOM AFFECTS MY ACTIVITY SLIGHTLY
SQUAT: ACTIVITY IS FAIRLY DIFFICULT
AS_A_RESULT_OF_YOUR_KNEE_INJURY,_HOW_WOULD_YOU_RATE_YOUR_CURRENT_LEVEL_OF_DAILY_ACTIVITY?: ABNORMAL
HOW_WOULD_YOU_RATE_THE_OVERALL_FUNCTION_OF_YOUR_KNEE_DURING_YOUR_USUAL_DAILY_ACTIVITIES?: ABNORMAL
STAND: ACTIVITY IS MINIMALLY DIFFICULT
STIFFNESS: I HAVE THE SYMPTOM BUT IT DOES NOT AFFECT MY ACTIVITY
SWELLING: I DO NOT HAVE THE SYMPTOM
RAW_SCORE: 52
RISE FROM A CHAIR: ACTIVITY IS SOMEWHAT DIFFICULT
KNEEL ON THE FRONT OF YOUR KNEE: ACTIVITY IS SOMEWHAT DIFFICULT
SIT WITH YOUR KNEE BENT: ACTIVITY IS MINIMALLY DIFFICULT
GIVING WAY, BUCKLING OR SHIFTING OF KNEE: I HAVE THE SYMPTOM BUT IT DOES NOT AFFECT MY ACTIVITY
WALK: ACTIVITY IS NOT DIFFICULT
HOW_WOULD_YOU_RATE_THE_CURRENT_FUNCTION_OF_YOUR_KNEE_DURING_YOUR_USUAL_DAILY_ACTIVITIES_ON_A_SCALE_FROM_0_TO_100_WITH_100_BEING_YOUR_LEVEL_OF_KNEE_FUNCTION_PRIOR_TO_YOUR_INJURY_AND_0_BEING_THE_INABILITY_TO_PERFORM_ANY_OF_YOUR_USUAL_DAILY_ACTIVITIES?: 90
GO UP STAIRS: ACTIVITY IS SOMEWHAT DIFFICULT
LIMPING: I HAVE THE SYMPTOM BUT IT DOES NOT AFFECT MY ACTIVITY
KNEE_ACTIVITY_OF_DAILY_LIVING_SUM: 52

## 2021-07-28 NOTE — PROGRESS NOTES
Physical Therapy Initial Evaluation  Subjective:    Patient Health History  Abelino Gracia being seen for Knee pain and hand pain.     Problem began: 7/28/2021.   Problem occurred: No date ongoing problem off and on but now hasmt gone away   Pain is reported as 3/10 on pain scale.  General health as reported by patient is fair.  Pertinent medical history includes: asthma, chemical dependency, depression, kidney disease, migraines/headaches, numbness/tingling, overweight, pain at night/rest and sleep disorder/apnea.   Red flags:  None as reported by patient.  Medical allergies: adhesives. Other medical allergies details: Some adhesieves used in some clinic band- aids.   Surgeries include:  Other. Other surgery history details: Tonsils, apendix, gall bladder.    Current medications:  Anti-depressants and other. Other medications details: Gabapentin for neuropathy plus other meds on med list.    Current occupation is .   Primary job tasks include:  Computer work and prolonged sitting.                    Pt has attempted to lose weight a few times over the years but reports getting B knee pain each time which limits his tolerance and he quits his weight loss effort. This happened again a few months ago and he also moved to a new home which required lifting and walking up and down stairs resulting in increased B knee pain.     B knee xray on 7/15/21:  IMPRESSION:  Minimal hypertrophic change of the patellofemoral  compartment. No joint space narrowing. Normal patellar alignment. No  knee joint effusion.              Knee Activity of Daily Living Score: 74.29            Objective:        KNEE:    PROM:   L  R   Hyperextension     Extension     Flexion     AROM R knee: 2-0-140degs  AROM L knee: 2-0-140degs      Strength:   L R   HIP     Flex 4/5 4/5   Ext 4/5 4/5   Abd 4/5 4/5   KNEE     Flex 5/5 5/5   Ext 5/5 5/5     Hip PROM:     L R   IR 15 10   ER 90 90   Hilton's neg neg   Evan nt nt       Special  tests:   L R   Anterior Drawer     Posterior Drawer     Lachman's     Valgus 0 degrees     Valgus 30 degrees     Varus 0 degrees     Varus 30 degrees     Roselia's     Appley's     Lateral Compression     Patellar Compression         Palpation: no TTP    Patellar tracking: lateral tracking, minimal movement on R patellar    Functional: difficulty performing sit<>stand transfers, ascending stairs    Gait: slight ER bilaterally on level, able to negotiate stairs with reciprocal pattern using handrail on ascent with mild pain and audible crepitus and no use of handrail on descent however poor eccentric control and avoiding quad activation by running down.            System    Physical Exam    General     ROS    Assessment/Plan:    Patient is a 36 year old male with both sides knee complaints.    Patient has the following significant findings with corresponding treatment plan.                Diagnosis 1:  B knee pain  Pain -  hot/cold therapy, electric stimulation, manual therapy, splint/taping/bracing/orthotics, education and home program  Decreased ROM/flexibility - manual therapy, therapeutic exercise, therapeutic activity and home program  Decreased joint mobility - manual therapy, therapeutic exercise, therapeutic activity and home program  Decreased strength - therapeutic exercise, therapeutic activities and home program  Decreased function - therapeutic activities and home program      Cumulative Therapy Evaluation is: Low complexity.    Previous and current functional limitations:  (See Goal Flow Sheet for this information)    Short term and Long term goals: (See Goal Flow Sheet for this information)     Communication ability:  Patient appears to be able to clearly communicate and understand verbal and written communication and follow directions correctly.  Treatment Explanation - The following has been discussed with the patient:   RX ordered/plan of care  Anticipated outcomes  Possible risks and side  effects  This patient would benefit from PT intervention to resume normal activities.   Rehab potential is good.    Frequency:  1 X week, once daily  Duration:  for 10 weeks  Discharge Plan:  Achieve all LTG.  Independent in home treatment program.  Reach maximal therapeutic benefit.    Please refer to the daily flowsheet for treatment today, total treatment time and time spent performing 1:1 timed codes.

## 2021-08-03 ENCOUNTER — THERAPY VISIT (OUTPATIENT)
Dept: PHYSICAL THERAPY | Facility: CLINIC | Age: 36
End: 2021-08-03
Payer: COMMERCIAL

## 2021-08-03 DIAGNOSIS — M25.561 ARTHRALGIA OF BOTH KNEES: Primary | ICD-10-CM

## 2021-08-03 DIAGNOSIS — M25.562 ARTHRALGIA OF BOTH KNEES: Primary | ICD-10-CM

## 2021-08-03 PROCEDURE — 97140 MANUAL THERAPY 1/> REGIONS: CPT | Mod: GP | Performed by: PHYSICAL THERAPIST

## 2021-08-03 PROCEDURE — 97110 THERAPEUTIC EXERCISES: CPT | Mod: GP | Performed by: PHYSICAL THERAPIST

## 2021-08-05 DIAGNOSIS — G43.109 MIGRAINE WITH AURA AND WITHOUT STATUS MIGRAINOSUS, NOT INTRACTABLE: ICD-10-CM

## 2021-08-06 RX ORDER — PROPRANOLOL HCL 60 MG
CAPSULE, EXTENDED RELEASE 24HR ORAL
Qty: 90 CAPSULE | Refills: 3 | Status: CANCELLED | OUTPATIENT
Start: 2021-08-06

## 2021-08-06 NOTE — TELEPHONE ENCOUNTER
Routing refill request to provider for review/approval because:  Drug interaction warning          Pending Prescriptions:                       Disp   Refills    propranolol ER (INDERAL LA) 60 MG 24 hr ca*90 cap*3        Sig: TAKE 1 CAPSULE BY MOUTH EVERY DAY    propranolol ER (INDERAL LA) 60 MG 24 hr ca*90 cap*3        Sig: TAKE 1 CAPSULE BY MOUTH EVERY DAY        Aly Robles RN

## 2021-08-09 RX ORDER — PROPRANOLOL HCL 60 MG
CAPSULE, EXTENDED RELEASE 24HR ORAL
Qty: 90 CAPSULE | Refills: 0 | Status: SHIPPED | OUTPATIENT
Start: 2021-08-09 | End: 2021-10-29

## 2021-08-10 ENCOUNTER — THERAPY VISIT (OUTPATIENT)
Dept: PHYSICAL THERAPY | Facility: CLINIC | Age: 36
End: 2021-08-10
Payer: COMMERCIAL

## 2021-08-10 DIAGNOSIS — M25.562 ARTHRALGIA OF BOTH KNEES: Primary | ICD-10-CM

## 2021-08-10 DIAGNOSIS — M25.561 ARTHRALGIA OF BOTH KNEES: Primary | ICD-10-CM

## 2021-08-10 PROCEDURE — 97110 THERAPEUTIC EXERCISES: CPT | Mod: GP | Performed by: PHYSICAL THERAPIST

## 2021-08-17 ENCOUNTER — MYC MEDICAL ADVICE (OUTPATIENT)
Dept: FAMILY MEDICINE | Facility: CLINIC | Age: 36
End: 2021-08-17

## 2021-08-17 DIAGNOSIS — G63 POLYNEUROPATHY ASSOCIATED WITH UNDERLYING DISEASE (H): ICD-10-CM

## 2021-08-17 RX ORDER — GABAPENTIN 100 MG/1
100-600 CAPSULE ORAL 3 TIMES DAILY
Qty: 150 CAPSULE | Refills: 3 | Status: SHIPPED | OUTPATIENT
Start: 2021-08-17 | End: 2021-11-29

## 2021-08-20 ENCOUNTER — MYC MEDICAL ADVICE (OUTPATIENT)
Dept: FAMILY MEDICINE | Facility: CLINIC | Age: 36
End: 2021-08-20

## 2021-08-20 DIAGNOSIS — M17.0 PRIMARY OSTEOARTHRITIS OF BOTH KNEES: Primary | ICD-10-CM

## 2021-08-20 RX ORDER — GABAPENTIN 600 MG/1
600 TABLET ORAL 3 TIMES DAILY
Qty: 270 TABLET | Refills: 4 | Status: SHIPPED | OUTPATIENT
Start: 2021-08-20 | End: 2022-04-28

## 2021-08-20 NOTE — TELEPHONE ENCOUNTER
"Pt also sent [employer] message on 8/17/21: \"I'm starting to get close to finishing the taper if gabapentin to 600mg three times a day. I don't have a script for the 600mg capsules. Do I need an appointment to check in?\"    Routing to PCP to help advise.    Emily BURGESS RN, BSN  [facility]      "

## 2021-08-26 DIAGNOSIS — G63 POLYNEUROPATHY ASSOCIATED WITH UNDERLYING DISEASE (H): ICD-10-CM

## 2021-08-27 RX ORDER — GABAPENTIN 100 MG/1
CAPSULE ORAL
Qty: 150 CAPSULE | Refills: 3 | OUTPATIENT
Start: 2021-08-27

## 2021-08-27 NOTE — TELEPHONE ENCOUNTER
Duplicate request denied.  Prescription already sent to the same requesting pharmacy.    gabapentin (NEURONTIN) 600 MG tablet 270 tablet 4 8/20/2021  --   Sig - Route: Take 1 tablet (600 mg) by mouth 3 times daily - Oral   Sent to pharmacy as: Gabapentin 600 MG Oral Tablet (NEURONTIN)   Class: E-Prescribe   Order: [id number]   E-Prescribing Status: Receipt confirmed by pharmacy (8/20/2021 10:18 AM CDT)   Printout Tracking    External Result Report   Pharmacy    [id number] - [place] - [id number] [address] [facility] [address]     Azalia Castillo RN on 8/27/2021 at 11:24 AM

## 2021-09-07 ENCOUNTER — THERAPY VISIT (OUTPATIENT)
Dept: PHYSICAL THERAPY | Facility: CLINIC | Age: 36
End: 2021-09-07
Payer: COMMERCIAL

## 2021-09-07 DIAGNOSIS — M25.561 ARTHRALGIA OF BOTH KNEES: Primary | ICD-10-CM

## 2021-09-07 DIAGNOSIS — M25.562 ARTHRALGIA OF BOTH KNEES: Primary | ICD-10-CM

## 2021-09-07 PROCEDURE — 97110 THERAPEUTIC EXERCISES: CPT | Mod: GP | Performed by: PHYSICAL THERAPIST

## 2021-09-07 PROCEDURE — 97112 NEUROMUSCULAR REEDUCATION: CPT | Mod: GP | Performed by: PHYSICAL THERAPIST

## 2021-09-15 ENCOUNTER — THERAPY VISIT (OUTPATIENT)
Dept: PHYSICAL THERAPY | Facility: CLINIC | Age: 36
End: 2021-09-15
Payer: COMMERCIAL

## 2021-09-15 DIAGNOSIS — M25.562 ARTHRALGIA OF BOTH KNEES: Primary | ICD-10-CM

## 2021-09-15 DIAGNOSIS — M25.561 ARTHRALGIA OF BOTH KNEES: Primary | ICD-10-CM

## 2021-09-15 PROCEDURE — 97112 NEUROMUSCULAR REEDUCATION: CPT | Mod: GP | Performed by: PHYSICAL THERAPIST

## 2021-09-15 PROCEDURE — 97110 THERAPEUTIC EXERCISES: CPT | Mod: GP | Performed by: PHYSICAL THERAPIST

## 2021-09-17 ENCOUNTER — LAB (OUTPATIENT)
Dept: LAB | Facility: CLINIC | Age: 36
End: 2021-09-17
Payer: COMMERCIAL

## 2021-09-17 DIAGNOSIS — G60.3 IDIOPATHIC PROGRESSIVE NEUROPATHY: ICD-10-CM

## 2021-09-17 DIAGNOSIS — Z13.220 SCREENING FOR HYPERLIPIDEMIA: Primary | ICD-10-CM

## 2021-09-17 DIAGNOSIS — E55.9 VITAMIN D DEFICIENCY: ICD-10-CM

## 2021-09-17 DIAGNOSIS — N18.32 STAGE 3B CHRONIC KIDNEY DISEASE (H): ICD-10-CM

## 2021-09-17 DIAGNOSIS — K75.81 NASH (NONALCOHOLIC STEATOHEPATITIS): ICD-10-CM

## 2021-09-17 DIAGNOSIS — N18.30 CKD (CHRONIC KIDNEY DISEASE) STAGE 3, GFR 30-59 ML/MIN (H): ICD-10-CM

## 2021-09-17 LAB
ALBUMIN SERPL-MCNC: 4.1 G/DL (ref 3.4–5)
ALBUMIN UR-MCNC: NEGATIVE MG/DL
ANION GAP SERPL CALCULATED.3IONS-SCNC: 7 MMOL/L (ref 3–14)
APPEARANCE UR: CLEAR
BASOPHILS # BLD AUTO: 0.1 10E3/UL (ref 0–0.2)
BASOPHILS NFR BLD AUTO: 1 %
BILIRUB UR QL STRIP: NEGATIVE
BUN SERPL-MCNC: 41 MG/DL (ref 7–30)
CALCIUM SERPL-MCNC: 9.4 MG/DL (ref 8.5–10.1)
CHLORIDE BLD-SCNC: 107 MMOL/L (ref 94–109)
CHOLEST SERPL-MCNC: 169 MG/DL
CO2 SERPL-SCNC: 24 MMOL/L (ref 20–32)
COLOR UR AUTO: YELLOW
CREAT SERPL-MCNC: 2.25 MG/DL (ref 0.66–1.25)
CREAT UR-MCNC: 62 MG/DL
EOSINOPHIL # BLD AUTO: 1.3 10E3/UL (ref 0–0.7)
EOSINOPHIL NFR BLD AUTO: 13 %
ERYTHROCYTE [DISTWIDTH] IN BLOOD BY AUTOMATED COUNT: 13.6 % (ref 10–15)
ERYTHROCYTE [SEDIMENTATION RATE] IN BLOOD BY WESTERGREN METHOD: 7 MM/HR (ref 0–15)
FASTING STATUS PATIENT QL REPORTED: YES
FOLATE SERPL-MCNC: 14.6 NG/ML
GFR SERPL CREATININE-BSD FRML MDRD: 36 ML/MIN/1.73M2
GLUCOSE BLD-MCNC: 111 MG/DL (ref 70–99)
GLUCOSE UR STRIP-MCNC: NEGATIVE MG/DL
HCT VFR BLD AUTO: 51.4 % (ref 40–53)
HDLC SERPL-MCNC: 39 MG/DL
HGB BLD-MCNC: 17.1 G/DL (ref 13.3–17.7)
HGB UR QL STRIP: NEGATIVE
KETONES UR STRIP-MCNC: NEGATIVE MG/DL
LDLC SERPL CALC-MCNC: 106 MG/DL
LEUKOCYTE ESTERASE UR QL STRIP: NEGATIVE
LYMPHOCYTES # BLD AUTO: 2.1 10E3/UL (ref 0.8–5.3)
LYMPHOCYTES NFR BLD AUTO: 21 %
MCH RBC QN AUTO: 29.8 PG (ref 26.5–33)
MCHC RBC AUTO-ENTMCNC: 33.3 G/DL (ref 31.5–36.5)
MCV RBC AUTO: 90 FL (ref 78–100)
MONOCYTES # BLD AUTO: 1 10E3/UL (ref 0–1.3)
MONOCYTES NFR BLD AUTO: 10 %
NEUTROPHILS # BLD AUTO: 5.4 10E3/UL (ref 1.6–8.3)
NEUTROPHILS NFR BLD AUTO: 55 %
NITRATE UR QL: NEGATIVE
NONHDLC SERPL-MCNC: 130 MG/DL
PH UR STRIP: 5.5 [PH] (ref 5–7)
PHOSPHATE SERPL-MCNC: 3.1 MG/DL (ref 2.5–4.5)
PLATELET # BLD AUTO: 349 10E3/UL (ref 150–450)
POTASSIUM BLD-SCNC: 4.8 MMOL/L (ref 3.4–5.3)
PROT UR-MCNC: 0.13 G/L
PROT/CREAT 24H UR: 0.21 G/G CR (ref 0–0.2)
PTH-INTACT SERPL-MCNC: 58 PG/ML (ref 18–80)
RBC # BLD AUTO: 5.73 10E6/UL (ref 4.4–5.9)
RBC #/AREA URNS AUTO: NORMAL /HPF
SODIUM SERPL-SCNC: 138 MMOL/L (ref 133–144)
SP GR UR STRIP: 1.01 (ref 1–1.03)
TRIGL SERPL-MCNC: 121 MG/DL
TSH SERPL DL<=0.005 MIU/L-ACNC: 1.13 MU/L (ref 0.4–4)
UROBILINOGEN UR STRIP-ACNC: 0.2 E.U./DL
VIT B12 SERPL-MCNC: 705 PG/ML (ref 193–986)
WBC # BLD AUTO: 9.8 10E3/UL (ref 4–11)
WBC #/AREA URNS AUTO: NORMAL /HPF

## 2021-09-17 PROCEDURE — 80069 RENAL FUNCTION PANEL: CPT

## 2021-09-17 PROCEDURE — 84156 ASSAY OF PROTEIN URINE: CPT

## 2021-09-17 PROCEDURE — 81001 URINALYSIS AUTO W/SCOPE: CPT

## 2021-09-17 PROCEDURE — 36415 COLL VENOUS BLD VENIPUNCTURE: CPT

## 2021-09-17 PROCEDURE — 85652 RBC SED RATE AUTOMATED: CPT

## 2021-09-17 PROCEDURE — 82607 VITAMIN B-12: CPT

## 2021-09-17 PROCEDURE — 80061 LIPID PANEL: CPT

## 2021-09-17 PROCEDURE — 85025 COMPLETE CBC W/AUTO DIFF WBC: CPT

## 2021-09-17 PROCEDURE — 82306 VITAMIN D 25 HYDROXY: CPT

## 2021-09-17 PROCEDURE — 84443 ASSAY THYROID STIM HORMONE: CPT

## 2021-09-17 PROCEDURE — 82746 ASSAY OF FOLIC ACID SERUM: CPT

## 2021-09-17 PROCEDURE — 83970 ASSAY OF PARATHORMONE: CPT

## 2021-09-18 LAB — DEPRECATED CALCIDIOL+CALCIFEROL SERPL-MC: 25 UG/L (ref 20–75)

## 2021-09-21 ENCOUNTER — THERAPY VISIT (OUTPATIENT)
Dept: PHYSICAL THERAPY | Facility: CLINIC | Age: 36
End: 2021-09-21
Payer: COMMERCIAL

## 2021-09-21 DIAGNOSIS — M25.561 ARTHRALGIA OF BOTH KNEES: Primary | ICD-10-CM

## 2021-09-21 DIAGNOSIS — M25.562 ARTHRALGIA OF BOTH KNEES: Primary | ICD-10-CM

## 2021-09-21 PROCEDURE — 97110 THERAPEUTIC EXERCISES: CPT | Mod: GP | Performed by: PHYSICAL THERAPIST

## 2021-09-21 PROCEDURE — 97112 NEUROMUSCULAR REEDUCATION: CPT | Mod: GP | Performed by: PHYSICAL THERAPIST

## 2021-09-21 ASSESSMENT — ACTIVITIES OF DAILY LIVING (ADL)
KNEE_ACTIVITY_OF_DAILY_LIVING_SCORE: 64.29
PAIN: THE SYMPTOM AFFECTS MY ACTIVITY MODERATELY
SIT WITH YOUR KNEE BENT: ACTIVITY IS SOMEWHAT DIFFICULT
LIMPING: I DO NOT HAVE THE SYMPTOM
AS_A_RESULT_OF_YOUR_KNEE_INJURY,_HOW_WOULD_YOU_RATE_YOUR_CURRENT_LEVEL_OF_DAILY_ACTIVITY?: ABNORMAL
KNEE_ACTIVITY_OF_DAILY_LIVING_SUM: 45
GO UP STAIRS: ACTIVITY IS FAIRLY DIFFICULT
STIFFNESS: THE SYMPTOM AFFECTS MY ACTIVITY SLIGHTLY
RISE FROM A CHAIR: ACTIVITY IS SOMEWHAT DIFFICULT
STAND: ACTIVITY IS NOT DIFFICULT
WALK: ACTIVITY IS SOMEWHAT DIFFICULT
HOW_WOULD_YOU_RATE_THE_OVERALL_FUNCTION_OF_YOUR_KNEE_DURING_YOUR_USUAL_DAILY_ACTIVITIES?: ABNORMAL
SWELLING: I DO NOT HAVE THE SYMPTOM
HOW_WOULD_YOU_RATE_THE_CURRENT_FUNCTION_OF_YOUR_KNEE_DURING_YOUR_USUAL_DAILY_ACTIVITIES_ON_A_SCALE_FROM_0_TO_100_WITH_100_BEING_YOUR_LEVEL_OF_KNEE_FUNCTION_PRIOR_TO_YOUR_INJURY_AND_0_BEING_THE_INABILITY_TO_PERFORM_ANY_OF_YOUR_USUAL_DAILY_ACTIVITIES?: 90
SQUAT: ACTIVITY IS FAIRLY DIFFICULT
WEAKNESS: THE SYMPTOM AFFECTS MY ACTIVITY SLIGHTLY
KNEEL ON THE FRONT OF YOUR KNEE: ACTIVITY IS FAIRLY DIFFICULT
GO DOWN STAIRS: ACTIVITY IS FAIRLY DIFFICULT
RAW_SCORE: 45
GIVING WAY, BUCKLING OR SHIFTING OF KNEE: I DO NOT HAVE THE SYMPTOM

## 2021-09-21 NOTE — PROGRESS NOTES
Subjective:  HPI  Physical Exam       Knee Activity of Daily Living Score: 64.29            Objective:  System    Physical Exam    General     ROS    Assessment/Plan:    PROGRESS  REPORT    Progress reporting period is from 7/28/21 to 9/21/21.     SUBJECTIVE  Subjective: pt reports continued moderate to severe pain   Current Pain level: 7/10   Initial Pain level: 3/10        ;   ,     The objective findings are from DOS 9/21/21.    OBJECTIVE  Objective: minimal to no pain post session, improved strength and overal lactivity tolerance      ASSESSMENT/PLAN  Updated problem list and treatment plan: Diagnosis 1:  B knee pain  STG/LTGs have been met or progress has been made towards goals:  Yes, some intermittent improvement with walking and stairs negotiation  Assessment of Progress: The patient's condition has potential to improve.  Self Management Plans:  Patient is independent in a home treatment program.  I have re-evaluated this patient and find that the nature, scope, duration and intensity of the therapy is appropriate for the medical condition of the patient.  Abelino continues to require the following intervention to meet STG and LTG's: PT  We will discharge this patient from PT.    Recommendations:  Abiodun has made some improvements with overall activity tolerance, general strengthening, and intermittent pain. However, he continues to have moderate to severe pain with stairs and especially when immobile like when he sits each day for work and when playing video games. He admits post sessions that B knees feel much better after exercises. Currently we have 4 remaining PT visits and plan is to continue 1x/week once daily [id number] and re-assess PT needs after the next 4 visits.    Please refer to the daily flowsheet for treatment today, total treatment time and time spent performing 1:1 timed codes.

## 2021-09-25 DIAGNOSIS — E11.9 TYPE 2 DIABETES, HBA1C GOAL < 8% (H): ICD-10-CM

## 2021-09-26 ENCOUNTER — HEALTH MAINTENANCE LETTER (OUTPATIENT)
Age: 36
End: 2021-09-26

## 2021-09-27 NOTE — TELEPHONE ENCOUNTER
Routing refill request to provider for review/approval because:  Labs out of range:    Creatinine   Date Value Ref Range Status   09/17/2021 2.25 (H) 0.66 - 1.25 mg/dL Final   03/04/2021 2.15 (H) 0.66 - 1.25 mg/dL Final     Lab Results   Component Value Date    A1C 6.1 07/15/2021    A1C 6.0 03/04/2021    A1C 6.4 08/28/2020    A1C 6.0 12/16/2019    A1C 6.4 07/30/2019    A1C 6.4 08/28/2018

## 2021-09-28 ENCOUNTER — THERAPY VISIT (OUTPATIENT)
Dept: PHYSICAL THERAPY | Facility: CLINIC | Age: 36
End: 2021-09-28
Payer: COMMERCIAL

## 2021-09-28 DIAGNOSIS — M25.562 ARTHRALGIA OF BOTH KNEES: Primary | ICD-10-CM

## 2021-09-28 DIAGNOSIS — M25.561 ARTHRALGIA OF BOTH KNEES: Primary | ICD-10-CM

## 2021-09-28 PROCEDURE — 97110 THERAPEUTIC EXERCISES: CPT | Mod: GP | Performed by: PHYSICAL THERAPIST

## 2021-09-28 PROCEDURE — 97112 NEUROMUSCULAR REEDUCATION: CPT | Mod: GP | Performed by: PHYSICAL THERAPIST

## 2021-09-29 RX ORDER — LIRAGLUTIDE 6 MG/ML
INJECTION SUBCUTANEOUS
Qty: 9 ML | Refills: 0 | Status: SHIPPED | OUTPATIENT
Start: 2021-09-29 | End: 2021-12-23

## 2021-10-05 ENCOUNTER — THERAPY VISIT (OUTPATIENT)
Dept: PHYSICAL THERAPY | Facility: CLINIC | Age: 36
End: 2021-10-05
Payer: COMMERCIAL

## 2021-10-05 DIAGNOSIS — M25.561 ARTHRALGIA OF BOTH KNEES: Primary | ICD-10-CM

## 2021-10-05 DIAGNOSIS — M25.562 ARTHRALGIA OF BOTH KNEES: Primary | ICD-10-CM

## 2021-10-05 PROCEDURE — 97110 THERAPEUTIC EXERCISES: CPT | Mod: GP | Performed by: PHYSICAL THERAPIST

## 2021-10-05 PROCEDURE — 97112 NEUROMUSCULAR REEDUCATION: CPT | Mod: GP | Performed by: PHYSICAL THERAPIST

## 2021-10-14 ENCOUNTER — LAB (OUTPATIENT)
Dept: LAB | Facility: CLINIC | Age: 36
End: 2021-10-14
Payer: COMMERCIAL

## 2021-10-14 DIAGNOSIS — K75.81 NASH (NONALCOHOLIC STEATOHEPATITIS): ICD-10-CM

## 2021-10-14 LAB — HBA1C MFR BLD: 5.9 % (ref 0–5.6)

## 2021-10-14 PROCEDURE — 36415 COLL VENOUS BLD VENIPUNCTURE: CPT

## 2021-10-14 PROCEDURE — 83036 HEMOGLOBIN GLYCOSYLATED A1C: CPT

## 2021-10-15 ENCOUNTER — THERAPY VISIT (OUTPATIENT)
Dept: PHYSICAL THERAPY | Facility: CLINIC | Age: 36
End: 2021-10-15
Payer: COMMERCIAL

## 2021-10-15 DIAGNOSIS — M25.562 ARTHRALGIA OF BOTH KNEES: Primary | ICD-10-CM

## 2021-10-15 DIAGNOSIS — M25.561 ARTHRALGIA OF BOTH KNEES: Primary | ICD-10-CM

## 2021-10-15 PROCEDURE — 97110 THERAPEUTIC EXERCISES: CPT | Mod: GP | Performed by: PHYSICAL THERAPIST

## 2021-10-15 PROCEDURE — 97530 THERAPEUTIC ACTIVITIES: CPT | Mod: GP | Performed by: PHYSICAL THERAPIST

## 2021-10-15 PROCEDURE — 97112 NEUROMUSCULAR REEDUCATION: CPT | Mod: GP | Performed by: PHYSICAL THERAPIST

## 2021-10-18 ENCOUNTER — OFFICE VISIT (OUTPATIENT)
Dept: FAMILY MEDICINE | Facility: CLINIC | Age: 36
End: 2021-10-18
Payer: COMMERCIAL

## 2021-10-18 VITALS
TEMPERATURE: 98.6 F | OXYGEN SATURATION: 96 % | WEIGHT: 253 LBS | DIASTOLIC BLOOD PRESSURE: 82 MMHG | BODY MASS INDEX: 36.22 KG/M2 | SYSTOLIC BLOOD PRESSURE: 125 MMHG | HEART RATE: 79 BPM | HEIGHT: 70 IN

## 2021-10-18 DIAGNOSIS — G89.29 CHRONIC PAIN OF BOTH KNEES: ICD-10-CM

## 2021-10-18 DIAGNOSIS — M25.561 CHRONIC PAIN OF BOTH KNEES: ICD-10-CM

## 2021-10-18 DIAGNOSIS — M25.562 CHRONIC PAIN OF BOTH KNEES: ICD-10-CM

## 2021-10-18 DIAGNOSIS — R20.0 HAND NUMBNESS: Primary | ICD-10-CM

## 2021-10-18 PROCEDURE — 99213 OFFICE O/P EST LOW 20 MIN: CPT | Performed by: FAMILY MEDICINE

## 2021-10-18 ASSESSMENT — PAIN SCALES - GENERAL: PAINLEVEL: NO PAIN (1)

## 2021-10-18 ASSESSMENT — MIFFLIN-ST. JEOR: SCORE: 2068.85

## 2021-10-18 NOTE — PROGRESS NOTES
Assessment & Plan       ICD-10-CM    1. Hand numbness  R20.0 Orthopedic  Referral   2. Chronic pain of both knees  M25.561 Orthopedic  Referral    M25.562     G89.29      I suspect he has carpal tunnel syndrome, right greater than left  He has not responded to conservative treatment with splinting  He could be a candidate for a cortisone injection or perhaps EMG studies to confirm a diagnosis  He was intending to consult with Dr. Martinez on both his hands and his knees, so I will go ahead and make that referral for him and will defer any further evaluation and treatment to Dr. Martinez for this patient of Dr. Tavares's      Return in about 2 months (around 12/18/2021) for a recheck if symptoms worsen or fail to improve.    Lauro Cortés MD  [facility] Rascon is a 36 year old who presents for the following health issues     HPI     Pain in both hands that comes and goes. He states that something is wrong with them and don't know what it is. Pain is 1/10 today.  Knee pain. Knees crack when standing and bending. He has had physical therapy and it doesn't seem to be helping.    His primary care provider is Dr. Tavares.  He thought I was the orthopedic provider that he had seen a year ago, but that was actually Dr. Martinez.  The patient thought he was making an appointment with that provider.  In reviewing the patient's chart and in talking to him, the patient had a cortisone injection in September of last year for left radial styloid tenosynovitis.  That treatment was helpful for him.  He is not having much pain there anymore, but does get some numbness and tingling in his hands, especially the right hand.  He is right-hand dominant.  It is mainly the thumb and index and middle fingers that are affected.  He has been wearing wrist splints for about a year, but they do not seem to do much.  He has also been having some ongoing knee issues.  He has tried  "physical therapy for that, but it has not really been helping.  He did have x-rays of his knees a few months ago which showed some mild degenerative changes.    Patient Active Problem List   Diagnosis     Major depressive disorder     Insomnia     Migraine headache     Asthma, mild intermittent     DESHAUN (obstructive sleep apnea)- mild (AHI 5)     CKD (chronic kidney disease) stage 3, GFR 30-59 ml/min (H)     Leukocytosis     QURESHI (nonalcoholic steatohepatitis)     BMI 31.0-31.9,adult     Hypophosphatemia     Alcohol dependence in remission (H)     Generalized anxiety disorder     Morbid obesity (H)     Secondary renal hyperparathyroidism (H)     Tenosynovitis, de Quervain     Current Outpatient Medications   Medication     acetaminophen (TYLENOL) 325 MG tablet     albuterol (PROAIR HFA) 108 (90 Base) MCG/ACT inhaler     blood glucose monitoring ([employer] CONTOUR MONITOR) meter device kit     blood glucose monitoring ([employer] CONTOUR) test strip     blood glucose monitoring ([employer] MICROLET) lancets     buPROPion (WELLBUTRIN XL) 300 MG 24 hr tablet     Calcium Carbonate Antacid (TUMS PO)     cetirizine (ZYRTEC) 10 MG tablet     diphenhydrAMINE (BENADRYL) 25 MG tablet     Fexofenadine HCl (ALLEGRA PO)     fluticasone (FLONASE) 50 MCG/ACT nasal spray     gabapentin (NEURONTIN) 600 MG tablet     losartan (COZAAR) 25 MG tablet     melatonin 3 MG tablet     propranolol ER (INDERAL LA) 60 MG 24 hr capsule     VICTOZA PEN 18 MG/3ML soln     gabapentin (NEURONTIN) 100 MG capsule     Current Facility-Administered Medications   Medication     lidocaine 1 % injection 0.5 mL     triamcinolone (KENALOG-40) injection 20 mg         Review of Systems   Mainly significant for the above.      Objective    /82 (BP Location: Left arm, Patient Position: Sitting, Cuff Size: Adult Large)   Pulse 79   Temp 98.6  F (37  C) (Oral)   Ht 1.77 m (5' 9.69\")   Wt 114.8 kg (253 lb)   SpO2 96%   BMI 36.63 kg/m    Body mass index is 36.63 " kg/m .  Physical Exam   GENERAL: alert, no distress and obese  EXT: He has grossly normal strength in the hands bilaterally.  Negative Finkelstein's test on the left.  He does have a positive Tinel's sign at the wrist on the right, minimally positive on the left.  He has difficulty flexing his wrists adequately for a Phalen's test.    Knee x-ray results from July were reviewed with him.

## 2021-10-19 ENCOUNTER — THERAPY VISIT (OUTPATIENT)
Dept: PHYSICAL THERAPY | Facility: CLINIC | Age: 36
End: 2021-10-19
Payer: COMMERCIAL

## 2021-10-19 DIAGNOSIS — M25.562 ARTHRALGIA OF BOTH KNEES: Primary | ICD-10-CM

## 2021-10-19 DIAGNOSIS — M25.561 ARTHRALGIA OF BOTH KNEES: Primary | ICD-10-CM

## 2021-10-19 PROCEDURE — 97110 THERAPEUTIC EXERCISES: CPT | Mod: GP | Performed by: PHYSICAL THERAPIST

## 2021-10-19 PROCEDURE — 97112 NEUROMUSCULAR REEDUCATION: CPT | Mod: GP | Performed by: PHYSICAL THERAPIST

## 2021-10-19 ASSESSMENT — ACTIVITIES OF DAILY LIVING (ADL)
STIFFNESS: THE SYMPTOM AFFECTS MY ACTIVITY SLIGHTLY
SIT WITH YOUR KNEE BENT: ACTIVITY IS NOT DIFFICULT
HOW_WOULD_YOU_RATE_THE_CURRENT_FUNCTION_OF_YOUR_KNEE_DURING_YOUR_USUAL_DAILY_ACTIVITIES_ON_A_SCALE_FROM_0_TO_100_WITH_100_BEING_YOUR_LEVEL_OF_KNEE_FUNCTION_PRIOR_TO_YOUR_INJURY_AND_0_BEING_THE_INABILITY_TO_PERFORM_ANY_OF_YOUR_USUAL_DAILY_ACTIVITIES?: 90
GO UP STAIRS: ACTIVITY IS FAIRLY DIFFICULT
STAND: ACTIVITY IS MINIMALLY DIFFICULT
GIVING WAY, BUCKLING OR SHIFTING OF KNEE: I DO NOT HAVE THE SYMPTOM
RAW_SCORE: 53
HOW_WOULD_YOU_RATE_THE_OVERALL_FUNCTION_OF_YOUR_KNEE_DURING_YOUR_USUAL_DAILY_ACTIVITIES?: ABNORMAL
KNEE_ACTIVITY_OF_DAILY_LIVING_SUM: 53
SWELLING: I DO NOT HAVE THE SYMPTOM
WEAKNESS: I DO NOT HAVE THE SYMPTOM
RISE FROM A CHAIR: ACTIVITY IS MINIMALLY DIFFICULT
AS_A_RESULT_OF_YOUR_KNEE_INJURY,_HOW_WOULD_YOU_RATE_YOUR_CURRENT_LEVEL_OF_DAILY_ACTIVITY?: NEARLY NORMAL
KNEE_ACTIVITY_OF_DAILY_LIVING_SCORE: 75.71
SQUAT: ACTIVITY IS VERY DIFFICULT
WALK: ACTIVITY IS NOT DIFFICULT
KNEEL ON THE FRONT OF YOUR KNEE: ACTIVITY IS FAIRLY DIFFICULT
LIMPING: I DO NOT HAVE THE SYMPTOM
GO DOWN STAIRS: ACTIVITY IS SOMEWHAT DIFFICULT
PAIN: I HAVE THE SYMPTOM BUT IT DOES NOT AFFECT MY ACTIVITY

## 2021-10-19 NOTE — PROGRESS NOTES
Subjective:  HPI  Physical Exam               Objective:  System    Physical Exam    General     ROS    Assessment/Plan:    PROGRESS  REPORT    Progress reporting period is from 9/21/21 to 10/19/21.     SUBJECTIVE  Subjective: pt reports overall no gross change   Current Pain level: 2/10   Initial Pain level: 3/10   Changes in function: Yes, see goal flow sheet for change in function   Adverse reactions: None;   ,     The objective findings are from DOS 10/19/21.     Knee Activity of Daily Living Score: 75.71    (improved from 64.29 on 9/21/21)    OBJECTIVE  Objective: able to negotiate stairs with 4/10 pain in knees on ascent without handrail, handrail use on descent but not as painful.      ASSESSMENT/PLAN  Updated problem list and treatment plan: Diagnosis 1:  B knee pain  STG/LTGs have been met or progress has been made towards goals:  Yes, some improvement in stairs negotiation  Assessment of Progress: The patient's condition is improving.  The patient's condition has potential to improve.  Self Management Plans:  Patient is independent in a home treatment program.  I have re-evaluated this patient and find that the nature, scope, duration and intensity of the therapy is appropriate for the medical condition of the patient.  Abelino continues to require the following intervention to meet STG and LTG's: PT    Recommendations:  Abiodun has been seen for 10 PT visits over the past 12 weeks and demonstrates improvements in strength and overall activity tolerance despite continued B knee pain with squats and stairs ascent and descent. He has become more consistent with HEP and works hard in clinic and would benefit from continued PT for further functional strengthening. 1x/wk once daily [id number].    Please refer to the daily flowsheet for treatment today, total treatment time and time spent performing 1:1 timed codes.

## 2021-10-25 ENCOUNTER — OFFICE VISIT (OUTPATIENT)
Dept: ORTHOPEDICS | Facility: CLINIC | Age: 36
End: 2021-10-25
Payer: COMMERCIAL

## 2021-10-25 VITALS
HEART RATE: 96 BPM | BODY MASS INDEX: 35.07 KG/M2 | DIASTOLIC BLOOD PRESSURE: 90 MMHG | HEIGHT: 70 IN | RESPIRATION RATE: 18 BRPM | SYSTOLIC BLOOD PRESSURE: 135 MMHG | WEIGHT: 245 LBS

## 2021-10-25 DIAGNOSIS — R20.0 HAND NUMBNESS: Primary | ICD-10-CM

## 2021-10-25 DIAGNOSIS — G89.29 CHRONIC PAIN OF BOTH KNEES: ICD-10-CM

## 2021-10-25 DIAGNOSIS — M25.562 CHRONIC PAIN OF BOTH KNEES: ICD-10-CM

## 2021-10-25 DIAGNOSIS — M25.561 CHRONIC PAIN OF BOTH KNEES: ICD-10-CM

## 2021-10-25 PROCEDURE — 99243 OFF/OP CNSLTJ NEW/EST LOW 30: CPT | Performed by: ORTHOPAEDIC SURGERY

## 2021-10-25 ASSESSMENT — MIFFLIN-ST. JEOR: SCORE: 2037.56

## 2021-10-25 NOTE — LETTER
10/25/2021         RE: Abelino Gracia  [address] [facility] [address]        Dear Colleague,    Thank you for referring your patient, Abelino Gracia, to the [facility]. Please see a copy of my visit note below.    Abelino Gracia is a 36 year old male who is seen in consultation at the request of Dr. Lauro Cortés  for bilateral knee pain and hand numbness.  His knees hurt when standing and bending he is also noted a lot of cracking when he bends.  Physical therapy has not seemed to help.  He does not take anti-inflammatories.  He also complains of numbness of both hands in the median nerve distribution.  He has not had EMG.    Previous x-rays of the knees July 15, 2021 showed no significant arthritis.  There may be slight spurring on the patella's.    Past Medical History:   Diagnosis Date     Allergies     cats,dust, pollens     Depressive disorder      Leukocytosis      Panic disorder without agoraphobia      Uncomplicated asthma     Diagnosed as a child       Past Surgical History:   Procedure Laterality Date     APPENDECTOMY  2004     CHOLECYSTECTOMY       COLONOSCOPY N/A 2016    Procedure: COLONOSCOPY;  Surgeon: Mauro Tan MD;  Location:  GI     IR RENAL BIOPSY LEFT  7/15/2020     TONSILLECTOMY  2006     [facility] NONSPECIFIC PROCEDURE   2011     Laparoscopic cholecystectomy.       Family History   Problem Relation Age of Onset     Allergies Mother      Allergies Brother      Heart Disease Maternal Grandfather      Hypertension Maternal Grandfather      Other Cancer Maternal Grandfather      Asthma Other      Cancer - colorectal Maternal Grandmother 80     Cancer Maternal Grandmother 40        uterine cancer     Kidney Disease Maternal Grandmother          of kidney failure - ~ age 80     Diabetes Maternal Grandmother      Colon Cancer Maternal Grandmother      Other Cancer Maternal Grandmother        Social History      Socioeconomic History     Marital status:      Spouse name: Not on file     Number of children: 0     Years of education: Not on file     Highest education level: Not on file   Occupational History     Employer: [employer]     Comment: clinical pharmacy ???? - [employer]   Tobacco Use     Smoking status: Former Smoker     Packs/day: 1.00     Years: 20.00     Pack years: 20.00     Types: Cigarettes     Start date: 1998     Quit date: 3/5/2017     Years since quittin.6     Smokeless tobacco: Never Used     Tobacco comment: Have tried chantix   Substance and Sexual Activity     Alcohol use: No     Alcohol/week: 0.0 standard drinks     Comment: SOBER SINCE 2009     Drug use: No     Sexual activity: Yes     Partners: Male     Comment: 1 partner   Other Topics Concern     Parent/sibling w/ CABG, MI or angioplasty before 65F 55M? No      Service No     Blood Transfusions No     Caffeine Concern No     Occupational Exposure No     Hobby Hazards No     Sleep Concern Yes     Stress Concern Yes     Weight Concern No     Special Diet No     Back Care No     Exercise No     Bike Helmet No     Seat Belt No     Self-Exams No   Social History Narrative     Not on file     Social Determinants of Health     Financial Resource Strain:      Difficulty of Paying Living Expenses:    Food Insecurity:      Worried About Running Out of Food in the Last Year:      Ran Out of Food in the Last Year:    Transportation Needs:      Lack of Transportation (Medical):      Lack of Transportation (Non-Medical):    Physical Activity:      Days of Exercise per Week:      Minutes of Exercise per Session:    Stress:      Feeling of Stress :    Social Connections:      Frequency of Communication with Friends and Family:      Frequency of Social Gatherings with Friends and Family:      Attends Muslim Services:      Active Member of Clubs or Organizations:      Attends Club or Organization Meetings:       Marital Status:    Intimate Partner Violence:      Fear of Current or Ex-Partner:      Emotionally Abused:      Physically Abused:      Sexually Abused:        Current Outpatient Medications   Medication Sig Dispense Refill     acetaminophen (TYLENOL) 325 MG tablet Take 325 mg by mouth as needed        albuterol (PROAIR HFA) 108 (90 Base) MCG/ACT inhaler Inhale 2 puffs into the lungs every 4 hours as needed 2 Inhaler 3     blood glucose monitoring (SARA CONTOUR MONITOR) meter device kit Use to test blood sugars one time daily. Strips Ex: 7/31/2019, Lot: [id number], SN: [id number] 1 kit 0     blood glucose monitoring (SARA CONTOUR) test strip Use to test blood sugars one time daily 50 strip 11     blood glucose monitoring (SARA MICROLET) lancets Use to test blood sugar 1 times daily 100 each 11     buPROPion (WELLBUTRIN XL) 300 MG 24 hr tablet TAKE 1 TABLET BY MOUTH EVERY DAY 90 tablet 3     Calcium Carbonate Antacid (TUMS PO) Take by mouth as needed       cetirizine (ZYRTEC) 10 MG tablet Take 10 mg by mouth daily as needed        diphenhydrAMINE (BENADRYL) 25 MG tablet Take 50 mg by mouth nightly as needed for itching or allergies       Fexofenadine HCl (ALLEGRA PO) Take 60 mg by mouth daily as needed        fluticasone (FLONASE) 50 MCG/ACT nasal spray Spray 1-2 sprays into both nostrils daily as needed  1 Package 11     gabapentin (NEURONTIN) 600 MG tablet Take 1 tablet (600 mg) by mouth 3 times daily 270 tablet 4     losartan (COZAAR) 25 MG tablet Take 0.5 tablets (12.5 mg) by mouth daily To fill when current expires 45 tablet 3     melatonin 3 MG tablet Take 6 mg by mouth nightly as needed for sleep        propranolol ER (INDERAL LA) 60 MG 24 hr capsule TAKE 1 CAPSULE BY MOUTH EVERY DAY 90 capsule 0     VICTOZA PEN 18 MG/3ML soln ADMINISTER 0.6 MG UNDER THE SKIN DAILY 9 mL 0     gabapentin (NEURONTIN) 100 MG capsule Take 1-6 capsules (100-600 mg) by mouth 3 times daily 150 capsule 3       Allergies   Allergen  "Reactions     Clarithromycin Hives     Penicillins      Sulfa Drugs      Topamax [Topiramate]      Decreased gfr and decrease phosphorous with use     Adhesive Tape Rash       REVIEW OF SYSTEMS:  CONSTITUTIONAL:  NEGATIVE for fever, chills, change in weight, not feeling tired  SKIN:  NEGATIVE for worrisome rashes, no skin lumps, no skin ulcers and no non-healing wounds  EYES:  NEGATIVE for vision changes or irritation.  ENT/MOUTH:  NEGATIVE.  No hearing loss, no hoarseness, no difficulty swallowing.  RESP:  NEGATIVE. No cough or shortness of breath.  CV:  NEGATIVE for chest pain, palpitations or peripheral edema  GI:  NEGATIVE for nausea, abdominal pain, heartburn, or change in bowel habits  :  Negative. No dysuria, no hematuria  MUSCULOSKELETAL:  See HPI above  NEURO:  NEGATIVE . No headaches, no dizziness,  no numbness  ENDOCRINE:  NEGATIVE for temperature intolerance, skin/hair changes  HEME/ALLERGY/IMMUNE:  NEGATIVE for bleeding problems  PSYCHIATRIC:  NEGATIVE. no anxiety, no depression.      Exam:  Vitals: BP (!) 135/90   Pulse 96   Resp 18   Ht 1.778 m (5' 10\")   Wt 111.1 kg (245 lb)   BMI 35.15 kg/m    BMI= Body mass index is 35.15 kg/m .  Constitutional:  healthy, alert and no distress  Neuro: Alert and Oriented x 3, Sensation grossly WNL.  HEENT:  Atraumatic, EOMI  Neck:  Neck supple with no tenderness.  Psych: Affect normal   Respiratory: Breathing not labored.  Cardiovascular: normal peripheral pulses  Lymph: no adenopathy  Skin: No rashes,worrisome lesions or skin problems  Spine: straight, no straight leg raising pain.  Hips show full range of motion.  There is no tenderness over the sacro-iliac joints, sciatic notch, or greater trochanters.   He has full range of motion of both knees.  He does have some patellofemoral crepitation on range of motion.  He had anterior pain with lateral Roselia on both knees.  Had negative medial Roselia's.  He has no ligamentous laxity of MCL, LCL, or " cruciates.  There are no effusions.  Sensation, motor and circulation are intact.    Assessment:  Bilateral chondromalacia patella.  2. Probable bilateral carpal tunnel syndrome.    Plan: We have gone over strengthening exercises for quadriceps and hip abduction.  Weight loss advised.  We could consider steroid injection.  We will order bilateral EMG and see him back following this        Again, thank you for allowing me to participate in the care of your patient.        Sincerely,        Lauro Martinez MD

## 2021-10-27 DIAGNOSIS — G43.109 MIGRAINE WITH AURA AND WITHOUT STATUS MIGRAINOSUS, NOT INTRACTABLE: ICD-10-CM

## 2021-10-27 NOTE — PROGRESS NOTES
Abelino Gracia is a 36 year old male who is seen in consultation at the request of Dr. Lauro Cortés  for bilateral knee pain and hand numbness.  His knees hurt when standing and bending he is also noted a lot of cracking when he bends.  Physical therapy has not seemed to help.  He does not take anti-inflammatories.  He also complains of numbness of both hands in the median nerve distribution.  He has not had EMG.    Previous x-rays of the knees July 15, 2021 showed no significant arthritis.  There may be slight spurring on the patella's.    Past Medical History:   Diagnosis Date     Allergies     cats,dust, pollens     Depressive disorder      Leukocytosis      Panic disorder without agoraphobia      Uncomplicated asthma     Diagnosed as a child       Past Surgical History:   Procedure Laterality Date     APPENDECTOMY  2004     CHOLECYSTECTOMY       COLONOSCOPY N/A 2016    Procedure: COLONOSCOPY;  Surgeon: Mauro Tan MD;  Location: [facility]     IR RENAL BIOPSY LEFT  7/15/2020     TONSILLECTOMY  2006     [facility] NONSPECIFIC PROCEDURE   2011     Laparoscopic cholecystectomy.       Family History   Problem Relation Age of Onset     Allergies Mother      Allergies Brother      Heart Disease Maternal Grandfather      Hypertension Maternal Grandfather      Other Cancer Maternal Grandfather      Asthma Other      Cancer - colorectal Maternal Grandmother 80     Cancer Maternal Grandmother 40        uterine cancer     Kidney Disease Maternal Grandmother          of kidney failure - ~ age 80     Diabetes Maternal Grandmother      Colon Cancer Maternal Grandmother      Other Cancer Maternal Grandmother        Social History     Socioeconomic History     Marital status:      Spouse name: Not on file     Number of children: 0     Years of education: Not on file     Highest education level: Not on file   Occupational History     Employer: [employer]     Comment: clinical  pharmacy [employer]   Tobacco Use     Smoking status: Former Smoker     Packs/day: 1.00     Years: 20.00     Pack years: 20.00     Types: Cigarettes     Start date: 1998     Quit date: 3/5/2017     Years since quittin.6     Smokeless tobacco: Never Used     Tobacco comment: Have tried chantix   Substance and Sexual Activity     Alcohol use: No     Alcohol/week: 0.0 standard drinks     Comment: SOBER SINCE 2009     Drug use: No     Sexual activity: Yes     Partners: Male     Comment: 1 partner   Other Topics Concern     Parent/sibling w/ CABG, MI or angioplasty before 65F 55M? No      Service No     Blood Transfusions No     Caffeine Concern No     Occupational Exposure No     Hobby Hazards No     Sleep Concern Yes     Stress Concern Yes     Weight Concern No     Special Diet No     Back Care No     Exercise No     Bike Helmet No     Seat Belt No     Self-Exams No   Social History Narrative     Not on file     Social Determinants of Health     Financial Resource Strain:      Difficulty of Paying Living Expenses:    Food Insecurity:      Worried About Running Out of Food in the Last Year:      Ran Out of Food in the Last Year:    Transportation Needs:      Lack of Transportation (Medical):      Lack of Transportation (Non-Medical):    Physical Activity:      Days of Exercise per Week:      Minutes of Exercise per Session:    Stress:      Feeling of Stress :    Social Connections:      Frequency of Communication with Friends and Family:      Frequency of Social Gatherings with Friends and Family:      Attends Sabianism Services:      Active Member of Clubs or Organizations:      Attends Club or Organization Meetings:      Marital Status:    Intimate Partner Violence:      Fear of Current or Ex-Partner:      Emotionally Abused:      Physically Abused:      Sexually Abused:        Current Outpatient Medications   Medication Sig Dispense Refill     acetaminophen (TYLENOL) 325 MG  tablet Take 325 mg by mouth as needed        albuterol (PROAIR HFA) 108 (90 Base) MCG/ACT inhaler Inhale 2 puffs into the lungs every 4 hours as needed 2 Inhaler 3     blood glucose monitoring (SARA CONTOUR MONITOR) meter device kit Use to test blood sugars one time daily. Strips Ex: 7/31/2019, Lot: [id number], SN: [id number] 1 kit 0     blood glucose monitoring (SARA CONTOUR) test strip Use to test blood sugars one time daily 50 strip 11     blood glucose monitoring (SARA MICROLET) lancets Use to test blood sugar 1 times daily 100 each 11     buPROPion (WELLBUTRIN XL) 300 MG 24 hr tablet TAKE 1 TABLET BY MOUTH EVERY DAY 90 tablet 3     Calcium Carbonate Antacid (TUMS PO) Take by mouth as needed       cetirizine (ZYRTEC) 10 MG tablet Take 10 mg by mouth daily as needed        diphenhydrAMINE (BENADRYL) 25 MG tablet Take 50 mg by mouth nightly as needed for itching or allergies       Fexofenadine HCl (ALLEGRA PO) Take 60 mg by mouth daily as needed        fluticasone (FLONASE) 50 MCG/ACT nasal spray Spray 1-2 sprays into both nostrils daily as needed  1 Package 11     gabapentin (NEURONTIN) 600 MG tablet Take 1 tablet (600 mg) by mouth 3 times daily 270 tablet 4     losartan (COZAAR) 25 MG tablet Take 0.5 tablets (12.5 mg) by mouth daily To fill when current expires 45 tablet 3     melatonin 3 MG tablet Take 6 mg by mouth nightly as needed for sleep        propranolol ER (INDERAL LA) 60 MG 24 hr capsule TAKE 1 CAPSULE BY MOUTH EVERY DAY 90 capsule 0     VICTOZA PEN 18 MG/3ML soln ADMINISTER 0.6 MG UNDER THE SKIN DAILY 9 mL 0     gabapentin (NEURONTIN) 100 MG capsule Take 1-6 capsules (100-600 mg) by mouth 3 times daily 150 capsule 3       Allergies   Allergen Reactions     Clarithromycin Hives     Penicillins      Sulfa Drugs      Topamax [Topiramate]      Decreased gfr and decrease phosphorous with use     Adhesive Tape Rash       REVIEW OF SYSTEMS:  CONSTITUTIONAL:  NEGATIVE for fever, chills, change in weight,  "not feeling tired  SKIN:  NEGATIVE for worrisome rashes, no skin lumps, no skin ulcers and no non-healing wounds  EYES:  NEGATIVE for vision changes or irritation.  ENT/MOUTH:  NEGATIVE.  No hearing loss, no hoarseness, no difficulty swallowing.  RESP:  NEGATIVE. No cough or shortness of breath.  CV:  NEGATIVE for chest pain, palpitations or peripheral edema  GI:  NEGATIVE for nausea, abdominal pain, heartburn, or change in bowel habits  :  Negative. No dysuria, no hematuria  MUSCULOSKELETAL:  See HPI above  NEURO:  NEGATIVE . No headaches, no dizziness,  no numbness  ENDOCRINE:  NEGATIVE for temperature intolerance, skin/hair changes  HEME/ALLERGY/IMMUNE:  NEGATIVE for bleeding problems  PSYCHIATRIC:  NEGATIVE. no anxiety, no depression.      Exam:  Vitals: BP (!) 135/90   Pulse 96   Resp 18   Ht 1.778 m (5' 10\")   Wt 111.1 kg (245 lb)   BMI 35.15 kg/m    BMI= Body mass index is 35.15 kg/m .  Constitutional:  healthy, alert and no distress  Neuro: Alert and Oriented x 3, Sensation grossly WNL.  HEENT:  Atraumatic, EOMI  Neck:  Neck supple with no tenderness.  Psych: Affect normal   Respiratory: Breathing not labored.  Cardiovascular: normal peripheral pulses  Lymph: no adenopathy  Skin: No rashes,worrisome lesions or skin problems  Spine: straight, no straight leg raising pain.  Hips show full range of motion.  There is no tenderness over the sacro-iliac joints, sciatic notch, or greater trochanters.   He has full range of motion of both knees.  He does have some patellofemoral crepitation on range of motion.  He had anterior pain with lateral Roselia on both knees.  Had negative medial Roselia's.  He has no ligamentous laxity of MCL, LCL, or cruciates.  There are no effusions.  Sensation, motor and circulation are intact.    Assessment:  Bilateral chondromalacia patella.  2. Probable bilateral carpal tunnel syndrome.    Plan: We have gone over strengthening exercises for quadriceps and hip " abduction.  Weight loss advised.  We could consider steroid injection.  We will order bilateral EMG and see him back following this

## 2021-10-29 RX ORDER — PROPRANOLOL HCL 60 MG
CAPSULE, EXTENDED RELEASE 24HR ORAL
Qty: 90 CAPSULE | Refills: 0 | Status: SHIPPED | OUTPATIENT
Start: 2021-10-29 | End: 2022-02-01

## 2021-10-29 NOTE — TELEPHONE ENCOUNTER
"Routing refill request to provider for review/approval because:  BP out of range      Requested Prescriptions   Pending Prescriptions Disp Refills     propranolol ER (INDERAL LA) 60 MG 24 hr capsule [Pharmacy Med Name: PROPRANOLOL ER 60MG CAPSULES] 90 capsule 0     Sig: TAKE 1 CAPSULE BY MOUTH EVERY DAY       Beta-Blockers Protocol Failed - 10/27/2021  5:49 AM        Failed - Blood pressure under 140/90 in past 12 months     BP Readings from Last 3 Encounters:   10/25/21 (!) 135/90   10/18/21 125/82   07/15/21 133/81                 Passed - Patient is age 6 or older        Passed - Recent (12 mo) or future (30 days) visit within the authorizing provider's specialty     Patient has had an office visit with the authorizing provider or a provider within the authorizing providers department within the previous 12 mos or has a future within next 30 days. See \"Patient Info\" tab in inbasket, or \"Choose Columns\" in Meds & Orders section of the refill encounter.              Passed - Medication is active on med list           Crystal Lizarraga RN    "

## 2021-11-08 ENCOUNTER — OFFICE VISIT (OUTPATIENT)
Dept: NEUROLOGY | Facility: CLINIC | Age: 36
End: 2021-11-08
Attending: ORTHOPAEDIC SURGERY
Payer: COMMERCIAL

## 2021-11-08 DIAGNOSIS — R20.0 HAND NUMBNESS: ICD-10-CM

## 2021-11-08 PROCEDURE — 95910 NRV CNDJ TEST 7-8 STUDIES: CPT | Performed by: PSYCHIATRY & NEUROLOGY

## 2021-11-08 NOTE — PROGRESS NOTES
[facility]   EMG Laboratory      Nerve Conduction & EMG Report          Patient:       Abiodun Gracia  Patient ID:    [id number]  Gender:        Male  YOB: 1985  Age:           36 Years 9 Months      History and Examination:  Abiodun Gracia is a 36 year old man with intermittent numbness in both hands. He is referred for evaluation of suspected median neuropathy at the wrist.     Techniques:  Motor conduction studies were done with surface recording electrodes. Sensory conduction studies were performed with surface electrodes, unless indicated otherwise by (n), designating the use of subdermal recording electrodes. Temperature was monitored and recorded throughout the study. Upper extremities were maintained at a temperature of 32 degrees Centigrade or higher.     Results:  Bilateral median and ulnar sensory and mixed nerve conduction studies demonstrated mild and moderate median conduction slowing on the right and left, respectively, accentuated in the transcarpal segment. Bilateral median and ulnar motor conduction studies demonstrated mild relative prolongation of the right median latency to the second lumbrical.     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     Lauro Dunbar MD        Sensory NCS      Nerve / Sites Rec. Site Onset Peak Ref. NP Amp Ref. PP Amp Dist Dario Ref. Temp     ms ms ms  V  V  V cm m/s m/s  C   R MEDIAN - Dig II Anti      Wrist Dig II 3.07 3.96  30.8 10.0 53.7 14 45.6 48.0 32.7   L MEDIAN - Dig II Anti      Wrist Dig II 3.65 4.48  26.8 10.0 43.2 14 38.4 48.0 32.2   R ULNAR - Dig V Anti      Wrist Dig V 2.14 2.76  40.3 8.0 54.1 12.5 58.5 48.0 32.6   L ULNAR - Dig V Anti      Wrist Dig V 2.14 2.86  29.7 8.0 29.0 12.5 58.5 48.0 36.1   R MEDIAN - Ulnar - Palmar      Median Wrist 1.77 2.40 2.40 36.3  53.4 8 45.2  32.6      Ulnar Wrist 0.94 1.30 2.40  20.1  27.5 8 85.3  32   L MEDIAN - Ulnar - Palmar      Median Wrist 2.19 2.76 2.40 21.2  24.0 8 36.6  35.2      Ulnar Wrist 1.20 1.77 2.40 14.0  9.6 8 66.8  34.9       Motor NCS      Nerve / Sites Rec. Site Lat Ref. Amp Ref. Rel Amp Dist Dario Ref. Dur. Area Temp.     ms ms mV mV % cm m/s m/s ms %  C   R MEDIAN - APB      Wrist APB 4.17 4.40 5.3 5.0 100 8   6.98 100 32.4      Elbow APB 8.75  4.9  93.8 22.5 49.1 48.0 7.03 92.2 32.4   L MEDIAN - APB      Wrist APB 4.27 4.40 5.7 5.0 100 8   5.05 100 32.1      Elbow APB 8.70  5.8  102 23 52.0 48.0 5.47 103 32.4   R ULNAR - ADM      Wrist ADM 2.92 3.50 11.4 5.0 100 8   4.69 100 32      B.Elbow ADM 5.78  9.1  80.2 17.3 60.4 48.0 4.95 96.3 32.4      A.Elbow ADM 8.07  9.6  84.4 14.5 63.3 48.0 4.95 93.2 31.9   L ULNAR - ADM      Wrist ADM 2.97 3.50 8.7 5.0 100 8   4.90 100 32.7      B.Elbow ADM 5.99  8.8  101 18.5 61.2 48.0 5.00 96.1 32.2      A.Elbow ADM 7.97  8.1  92.3 13.1 66.2 48.0 5.16 96.9 32   R MEDIAN - II Lumb      Median II Lumb 3.75  1.6  100 10   6.51 100 32.4      Ulnar Palm Int 2.66  6.0  371 10   5.31 209 32.4

## 2021-11-08 NOTE — LETTER
11/8/2021         RE: Abelino Gracia  [address] [facility] [address]        Dear Colleague,    Thank you for referring your patient, Abelino Gracia, to the [facility]. Please see a copy of my visit note below.    [facility]   EMG Laboratory      Nerve Conduction & EMG Report          Patient:       Abiodun Gracia  Patient ID:    [id number]  Gender:        Male  YOB: 1985  Age:           36 Years 9 Months      History and Examination:  Abiodun Gracia is a 36 year old man with intermittent numbness in both hands. He is referred for evaluation of suspected median neuropathy at the wrist.     Techniques:  Motor conduction studies were done with surface recording electrodes. Sensory conduction studies were performed with surface electrodes, unless indicated otherwise by (n), designating the use of subdermal recording electrodes. Temperature was monitored and recorded throughout the study. Upper extremities were maintained at a temperature of 32 degrees Centigrade or higher.     Results:  Bilateral median and ulnar sensory and mixed nerve conduction studies demonstrated mild and moderate median conduction slowing on the right and left, respectively, accentuated in the transcarpal segment. Bilateral median and ulnar motor conduction studies demonstrated mild relative prolongation of the right median latency to the second lumbrical.     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     Lauro Dunbar MD        Sensory NCS      Nerve / Sites Rec. Site Onset Peak Ref. NP Amp Ref. PP Amp Dist Dario Ref. Temp     ms ms ms  V  V  V cm m/s m/s  C   R MEDIAN - Dig II Anti      Wrist Dig II 3.07 3.96  30.8 10.0 53.7 14 45.6 48.0 32.7   L MEDIAN - Dig II Anti      Wrist Dig II 3.65 4.48  26.8 10.0 43.2 14 38.4 48.0 32.2   R ULNAR -  Dig V Anti      Wrist Dig V 2.14 2.76  40.3 8.0 54.1 12.5 58.5 48.0 32.6   L ULNAR - Dig V Anti      Wrist Dig V 2.14 2.86  29.7 8.0 29.0 12.5 58.5 48.0 36.1   R MEDIAN - Ulnar - Palmar      Median Wrist 1.77 2.40 2.40 36.3  53.4 8 45.2  32.6      Ulnar Wrist 0.94 1.30 2.40 20.1  27.5 8 85.3  32   L MEDIAN - Ulnar - Palmar      Median Wrist 2.19 2.76 2.40 21.2  24.0 8 36.6  35.2      Ulnar Wrist 1.20 1.77 2.40 14.0  9.6 8 66.8  34.9       Motor NCS      Nerve / Sites Rec. Site Lat Ref. Amp Ref. Rel Amp Dist Dario Ref. Dur. Area Temp.     ms ms mV mV % cm m/s m/s ms %  C   R MEDIAN - APB      Wrist APB 4.17 4.40 5.3 5.0 100 8   6.98 100 32.4      Elbow APB 8.75  4.9  93.8 22.5 49.1 48.0 7.03 92.2 32.4   L MEDIAN - APB      Wrist APB 4.27 4.40 5.7 5.0 100 8   5.05 100 32.1      Elbow APB 8.70  5.8  102 23 52.0 48.0 5.47 103 32.4   R ULNAR - ADM      Wrist ADM 2.92 3.50 11.4 5.0 100 8   4.69 100 32      B.Elbow ADM 5.78  9.1  80.2 17.3 60.4 48.0 4.95 96.3 32.4      A.Elbow ADM 8.07  9.6  84.4 14.5 63.3 48.0 4.95 93.2 31.9   L ULNAR - ADM      Wrist ADM 2.97 3.50 8.7 5.0 100 8   4.90 100 32.7      B.Elbow ADM 5.99  8.8  101 18.5 61.2 48.0 5.00 96.1 32.2      A.Elbow ADM 7.97  8.1  92.3 13.1 66.2 48.0 5.16 96.9 32   R MEDIAN - II Lumb      Median II Lumb 3.75  1.6  100 10   6.51 100 32.4      Ulnar Palm Int 2.66  6.0  371 10   5.31 209 32.4                                    Again, thank you for allowing me to participate in the care of your patient.        Sincerely,        Lauro Dunbar MD

## 2021-11-22 ENCOUNTER — TELEPHONE (OUTPATIENT)
Dept: ORTHOPEDICS | Facility: CLINIC | Age: 36
End: 2021-11-22

## 2021-11-22 ENCOUNTER — OFFICE VISIT (OUTPATIENT)
Dept: ORTHOPEDICS | Facility: CLINIC | Age: 36
End: 2021-11-22
Payer: COMMERCIAL

## 2021-11-22 VITALS
WEIGHT: 251.2 LBS | BODY MASS INDEX: 35.96 KG/M2 | SYSTOLIC BLOOD PRESSURE: 127 MMHG | DIASTOLIC BLOOD PRESSURE: 88 MMHG | HEIGHT: 70 IN | HEART RATE: 70 BPM

## 2021-11-22 DIAGNOSIS — G56.01 CARPAL TUNNEL SYNDROME OF RIGHT WRIST: ICD-10-CM

## 2021-11-22 DIAGNOSIS — G56.02 CARPAL TUNNEL SYNDROME OF LEFT WRIST: ICD-10-CM

## 2021-11-22 PROCEDURE — 99213 OFFICE O/P EST LOW 20 MIN: CPT | Performed by: ORTHOPAEDIC SURGERY

## 2021-11-22 ASSESSMENT — PAIN SCALES - GENERAL: PAINLEVEL: NO PAIN (1)

## 2021-11-22 ASSESSMENT — MIFFLIN-ST. JEOR: SCORE: 2065.69

## 2021-11-22 NOTE — PROGRESS NOTES
Abelino Gracia is a 36 year old male who is seen in follow-up for complaint of numbness of lateral aspect of bilateral hand, especially with use of the hand and at night. Pain is achey and throbbing.    He has had symptoms for several years.  It is getting worse.  Small finger is not involved.  Prior treatment used: Wrist splint - yes-has provided some relief over the past years, but is getting worse.  NSAID: - yes-ibuprofen.    Employment: . This is not work related.      PAST MEDICAL HISTORY:  Diabetes mellitus negative, hypothyroid negative.    EMG has been performed 21.  Showing bilateral carpal tunnel syndrome, left > right.    Past Medical History:   Diagnosis Date     Allergies     cats,dust, pollens     Depressive disorder      Leukocytosis      Panic disorder without agoraphobia      Uncomplicated asthma     Diagnosed as a child       Past Surgical History:   Procedure Laterality Date     APPENDECTOMY  2004     CHOLECYSTECTOMY       COLONOSCOPY N/A 2016    Procedure: COLONOSCOPY;  Surgeon: Mauro Tan MD;  Location: [facility]     IR RENAL BIOPSY LEFT  7/15/2020     TONSILLECTOMY  2006     [facility] NONSPECIFIC PROCEDURE   2011     Laparoscopic cholecystectomy.       Family History   Problem Relation Age of Onset     Allergies Mother      Allergies Brother      Heart Disease Maternal Grandfather      Hypertension Maternal Grandfather      Other Cancer Maternal Grandfather      Asthma Other      Cancer - colorectal Maternal Grandmother 80     Cancer Maternal Grandmother 40        uterine cancer     Kidney Disease Maternal Grandmother          of kidney failure - ~ age 80     Diabetes Maternal Grandmother      Colon Cancer Maternal Grandmother      Other Cancer Maternal Grandmother        Social History     Socioeconomic History     Marital status:      Spouse name: Not on file     Number of children: 0     Years of education: Not on file     Highest  education level: Not on file   Occupational History     Employer: [employer]     Comment: clinical pharmacy [employer]   Tobacco Use     Smoking status: Former Smoker     Packs/day: 1.00     Years: 20.00     Pack years: 20.00     Types: Cigarettes     Start date: 1998     Quit date: 3/5/2017     Years since quittin.7     Smokeless tobacco: Never Used     Tobacco comment: Have tried chantix   Substance and Sexual Activity     Alcohol use: No     Alcohol/week: 0.0 standard drinks     Comment: SOBER SINCE 2009     Drug use: No     Sexual activity: Yes     Partners: Male     Comment: 1 partner   Other Topics Concern     Parent/sibling w/ CABG, MI or angioplasty before 65F 55M? No      Service No     Blood Transfusions No     Caffeine Concern No     Occupational Exposure No     Hobby Hazards No     Sleep Concern Yes     Stress Concern Yes     Weight Concern No     Special Diet No     Back Care No     Exercise No     Bike Helmet No     Seat Belt No     Self-Exams No   Social History Narrative     Not on file     Social Determinants of Health     Financial Resource Strain: Not on file   Food Insecurity: Not on file   Transportation Needs: Not on file   Physical Activity: Not on file   Stress: Not on file   Social Connections: Not on file   Intimate Partner Violence: Not on file   Housing Stability: Not on file       Current Outpatient Medications   Medication Sig Dispense Refill     propranolol ER (INDERAL LA) 60 MG 24 hr capsule TAKE 1 CAPSULE BY MOUTH EVERY DAY 90 capsule 0     acetaminophen (TYLENOL) 325 MG tablet Take 325 mg by mouth as needed        albuterol (PROAIR HFA) 108 (90 Base) MCG/ACT inhaler Inhale 2 puffs into the lungs every 4 hours as needed 2 Inhaler 3     blood glucose monitoring (SARA CONTOUR MONITOR) meter device kit Use to test blood sugars one time daily. Strips Ex: 2019, Lot: [id number], SN: [id number] 1 kit 0     blood glucose monitoring (SARA  CONTOUR) test strip Use to test blood sugars one time daily 50 strip 11     blood glucose monitoring (SARA MICROLET) lancets Use to test blood sugar 1 times daily 100 each 11     buPROPion (WELLBUTRIN XL) 300 MG 24 hr tablet TAKE 1 TABLET BY MOUTH EVERY DAY 90 tablet 3     Calcium Carbonate Antacid (TUMS PO) Take by mouth as needed       cetirizine (ZYRTEC) 10 MG tablet Take 10 mg by mouth daily as needed        diphenhydrAMINE (BENADRYL) 25 MG tablet Take 50 mg by mouth nightly as needed for itching or allergies       Fexofenadine HCl (ALLEGRA PO) Take 60 mg by mouth daily as needed        fluticasone (FLONASE) 50 MCG/ACT nasal spray Spray 1-2 sprays into both nostrils daily as needed  1 Package 11     gabapentin (NEURONTIN) 100 MG capsule Take 1-6 capsules (100-600 mg) by mouth 3 times daily 150 capsule 3     gabapentin (NEURONTIN) 600 MG tablet Take 1 tablet (600 mg) by mouth 3 times daily 270 tablet 4     losartan (COZAAR) 25 MG tablet Take 0.5 tablets (12.5 mg) by mouth daily To fill when current expires 45 tablet 3     melatonin 3 MG tablet Take 6 mg by mouth nightly as needed for sleep        VICTOZA PEN 18 MG/3ML soln ADMINISTER 0.6 MG UNDER THE SKIN DAILY 9 mL 0       Allergies   Allergen Reactions     Clarithromycin Hives     Penicillins      Sulfa Drugs      Topamax [Topiramate]      Decreased gfr and decrease phosphorous with use     Adhesive Tape Rash       REVIEW OF SYSTEMS:  CONSTITUTIONAL:  NEGATIVE for fever, chills, change in weight, not feeling tired  SKIN:  NEGATIVE for worrisome rashes, no skin lumps, no skin ulcers and no non-healing wounds  EYES:  NEGATIVE for vision changes or irritation.  ENT/MOUTH:  NEGATIVE.  No hearing loss, no hoarseness, no difficulty swallowing.  RESP:  NEGATIVE. No cough or shortness of breath.  BREAST:  NEGATIVE for masses, tenderness or discharge  CV:  NEGATIVE for chest pain, palpitations or peripheral edema  GI:  NEGATIVE for nausea, abdominal pain, heartburn, or  "change in bowel habits  :  Negative. No dysuria, no hematuria  MUSCULOSKELETAL:  See HPI above  NEURO:  NEGATIVE . No headaches, no dizziness,  no numbness  ENDOCRINE:  NEGATIVE for temperature intolerance, skin/hair changes  HEME/ALLERGY/IMMUNE:  NEGATIVE for bleeding problems  PSYCHIATRIC:  NEGATIVE. no anxiety, no depression.          O: He appears well,   Vitals: /88   Pulse 70   Ht 1.778 m (5' 10\")   Wt 113.9 kg (251 lb 3.2 oz)   BMI 36.04 kg/m    BMI= Body mass index is 36.04 kg/m .   Cervical spine has full range of motion without pain.  Full range of motion of shoulder, elbows, wrists and fingers.  Hand exam revealed     Right: reduced sensation along median nerve distribution, + Phalen's maneuver, + Tinel's sign, no thenar atrophy, no weakness of thumb/pinky pincer grasp.  Left: reduced sensation along median nerve distribution, + Phalen's maneuver, + Tinel's sign, no thenar atrophy, no weakness of thumb/pinky pincer grasp   strength: Right normal, Left normal.    A: Bilateral carpal tunnel syndrome.      P: Explanation of median nerve entrapment is given.  The treatment spectrum is discussed, including conservative treatment with night splint, NSAID, activity modification, and possible surgical release if the symptoms are persistent and severe.     Will proceed with left carpal tunnel release with IV regional block.   He will plan right carpal tunnel release 2 weeks later.   Risks, benefits, potential complications and alternatives were discussed.    "

## 2021-11-22 NOTE — TELEPHONE ENCOUNTER
Type of surgery: Release carpal tunnel right   CPT 19510  Carpal tunnel syndrome of right wrist G56.01    Location of surgery: [facility]  Date and time of surgery: 12/17/2021  Surgeon: Michelle  Pre-Op Appt Date: 11/29/2021  Post-Op Appt Date: 12/27/2021   Packet sent out: Yes  Pre-cert/Authorization completed:  No prior auth required per [employer] [place]    Date: 11-22-21    Karlene Urbano  Prior Authorization Dept  [phone]

## 2021-11-22 NOTE — TELEPHONE ENCOUNTER
Type of surgery: release carpal tunnel left   CPT 31792  Carpal tunnel syndrome of left wrist G56.02    Location of surgery: [facility]  Date and time of surgery: 12/03/2021  Surgeon: Michelle  Pre-Op Appt Date: 11/29/2021  Post-Op Appt Date: 12/13/2021   Packet sent out: Yes  Pre-cert/Authorization completed:  No prior auth required per [place] [place].  Transaction ID: [id number]  Date: 11-22-21    Karlene Urbano  Prior Authorization Dept  [phone]

## 2021-11-22 NOTE — PATIENT INSTRUCTIONS
Surgery:  left carpal tunnel release.  Later right  carpal tunnel release.    Preop physical with primary physician is needed within 30 days of the surgery.  Nothing to eat or drink for 8 hours before surgery.  For same day surgery you need a ride.  Someone should stay with you for 12 hours afterward.  Stop blood thinners 5 days before surgery (aspirin, warfarin, anti-inflammatories).    You will need a Covid test before surgery.  They will call you to schedule that.    Surgery schedulers will call you to schedule surgery.    If you don't get a call in the next few days, then call [phone] to schedule your surgery for [place] or [phone] to schedule for [place].

## 2021-11-22 NOTE — LETTER
2021         RE: Abelino Gracia  [address] [facility] [address]        Dear Colleague,    Thank you for referring your patient, Abelino Gracia, to the [facility]. Please see a copy of my visit note below.    Abelino Gracia is a 36 year old male who is seen in follow-up for complaint of numbness of lateral aspect of bilateral hand, especially with use of the hand and at night. Pain is achey and throbbing.    He has had symptoms for several years.  It is getting worse.  Small finger is not involved.  Prior treatment used: Wrist splint - yes-has provided some relief over the past years, but is getting worse.  NSAID: - yes-ibuprofen.    Employment: . This is not work related.      PAST MEDICAL HISTORY:  Diabetes mellitus negative, hypothyroid negative.    EMG has been performed 21.  Showing bilateral carpal tunnel syndrome, left > right.    Past Medical History:   Diagnosis Date     Allergies     cats,dust, pollens     Depressive disorder      Leukocytosis      Panic disorder without agoraphobia      Uncomplicated asthma     Diagnosed as a child       Past Surgical History:   Procedure Laterality Date     APPENDECTOMY  2004     CHOLECYSTECTOMY       COLONOSCOPY N/A 2016    Procedure: COLONOSCOPY;  Surgeon: Mauro Tan MD;  [facility]     IR RENAL BIOPSY LEFT  7/15/2020     TONSILLECTOMY  2006     [facility] NONSPECIFIC PROCEDURE   2011     Laparoscopic cholecystectomy.       Family History   Problem Relation Age of Onset     Allergies Mother      Allergies Brother      Heart Disease Maternal Grandfather      Hypertension Maternal Grandfather      Other Cancer Maternal Grandfather      Asthma Other      Cancer - colorectal Maternal Grandmother 80     Cancer Maternal Grandmother 40        uterine cancer     Kidney Disease Maternal Grandmother          of kidney failure - ~ age 80     Diabetes Maternal  Grandmother      Colon Cancer Maternal Grandmother      Other Cancer Maternal Grandmother        Social History     Socioeconomic History     Marital status:      Spouse name: Not on file     Number of children: 0     Years of education: Not on file     Highest education level: Not on file   Occupational History     Employer: [employer]     Comment: clinical pharmacy [employer]   Tobacco Use     Smoking status: Former Smoker     Packs/day: 1.00     Years: 20.00     Pack years: 20.00     Types: Cigarettes     Start date: 1998     Quit date: 3/5/2017     Years since quittin.7     Smokeless tobacco: Never Used     Tobacco comment: Have tried chantix   Substance and Sexual Activity     Alcohol use: No     Alcohol/week: 0.0 standard drinks     Comment: SOBER SINCE 2009     Drug use: No     Sexual activity: Yes     Partners: Male     Comment: 1 partner   Other Topics Concern     Parent/sibling w/ CABG, MI or angioplasty before 65F 55M? No      Service No     Blood Transfusions No     Caffeine Concern No     Occupational Exposure No     Hobby Hazards No     Sleep Concern Yes     Stress Concern Yes     Weight Concern No     Special Diet No     Back Care No     Exercise No     Bike Helmet No     Seat Belt No     Self-Exams No   Social History Narrative     Not on file     Social Determinants of Health     Financial Resource Strain: Not on file   Food Insecurity: Not on file   Transportation Needs: Not on file   Physical Activity: Not on file   Stress: Not on file   Social Connections: Not on file   Intimate Partner Violence: Not on file   Housing Stability: Not on file       Current Outpatient Medications   Medication Sig Dispense Refill     propranolol ER (INDERAL LA) 60 MG 24 hr capsule TAKE 1 CAPSULE BY MOUTH EVERY DAY 90 capsule 0     acetaminophen (TYLENOL) 325 MG tablet Take 325 mg by mouth as needed        albuterol (PROAIR HFA) 108 (90 Base) MCG/ACT inhaler Inhale  2 puffs into the lungs every 4 hours as needed 2 Inhaler 3     blood glucose monitoring (SARA CONTOUR MONITOR) meter device kit Use to test blood sugars one time daily. Strips Ex: 7/31/2019, Lot: [id number], SN: [id number] 1 kit 0     blood glucose monitoring (SARA CONTOUR) test strip Use to test blood sugars one time daily 50 strip 11     blood glucose monitoring (SARA MICROLET) lancets Use to test blood sugar 1 times daily 100 each 11     buPROPion (WELLBUTRIN XL) 300 MG 24 hr tablet TAKE 1 TABLET BY MOUTH EVERY DAY 90 tablet 3     Calcium Carbonate Antacid (TUMS PO) Take by mouth as needed       cetirizine (ZYRTEC) 10 MG tablet Take 10 mg by mouth daily as needed        diphenhydrAMINE (BENADRYL) 25 MG tablet Take 50 mg by mouth nightly as needed for itching or allergies       Fexofenadine HCl (ALLEGRA PO) Take 60 mg by mouth daily as needed        fluticasone (FLONASE) 50 MCG/ACT nasal spray Spray 1-2 sprays into both nostrils daily as needed  1 Package 11     gabapentin (NEURONTIN) 100 MG capsule Take 1-6 capsules (100-600 mg) by mouth 3 times daily 150 capsule 3     gabapentin (NEURONTIN) 600 MG tablet Take 1 tablet (600 mg) by mouth 3 times daily 270 tablet 4     losartan (COZAAR) 25 MG tablet Take 0.5 tablets (12.5 mg) by mouth daily To fill when current expires 45 tablet 3     melatonin 3 MG tablet Take 6 mg by mouth nightly as needed for sleep        VICTOZA PEN 18 MG/3ML soln ADMINISTER 0.6 MG UNDER THE SKIN DAILY 9 mL 0       Allergies   Allergen Reactions     Clarithromycin Hives     Penicillins      Sulfa Drugs      Topamax [Topiramate]      Decreased gfr and decrease phosphorous with use     Adhesive Tape Rash       REVIEW OF SYSTEMS:  CONSTITUTIONAL:  NEGATIVE for fever, chills, change in weight, not feeling tired  SKIN:  NEGATIVE for worrisome rashes, no skin lumps, no skin ulcers and no non-healing wounds  EYES:  NEGATIVE for vision changes or irritation.  ENT/MOUTH:  NEGATIVE.  No hearing  "loss, no hoarseness, no difficulty swallowing.  RESP:  NEGATIVE. No cough or shortness of breath.  BREAST:  NEGATIVE for masses, tenderness or discharge  CV:  NEGATIVE for chest pain, palpitations or peripheral edema  GI:  NEGATIVE for nausea, abdominal pain, heartburn, or change in bowel habits  :  Negative. No dysuria, no hematuria  MUSCULOSKELETAL:  See HPI above  NEURO:  NEGATIVE . No headaches, no dizziness,  no numbness  ENDOCRINE:  NEGATIVE for temperature intolerance, skin/hair changes  HEME/ALLERGY/IMMUNE:  NEGATIVE for bleeding problems  PSYCHIATRIC:  NEGATIVE. no anxiety, no depression.          O: He appears well,   Vitals: /88   Pulse 70   Ht 1.778 m (5' 10\")   Wt 113.9 kg (251 lb 3.2 oz)   BMI 36.04 kg/m    BMI= Body mass index is 36.04 kg/m .   Cervical spine has full range of motion without pain.  Full range of motion of shoulder, elbows, wrists and fingers.  Hand exam revealed     Right: reduced sensation along median nerve distribution, + Phalen's maneuver, + Tinel's sign, no thenar atrophy, no weakness of thumb/pinky pincer grasp.  Left: reduced sensation along median nerve distribution, + Phalen's maneuver, + Tinel's sign, no thenar atrophy, no weakness of thumb/pinky pincer grasp   strength: Right normal, Left normal.    A: Bilateral carpal tunnel syndrome.      P: Explanation of median nerve entrapment is given.  The treatment spectrum is discussed, including conservative treatment with night splint, NSAID, activity modification, and possible surgical release if the symptoms are persistent and severe.     Will proceed with left carpal tunnel release with IV regional block.   He will plan right carpal tunnel release 2 weeks later.   Risks, benefits, potential complications and alternatives were discussed.        Again, thank you for allowing me to participate in the care of your patient.        Sincerely,        Lauro Martinez MD    "

## 2021-11-29 ENCOUNTER — OFFICE VISIT (OUTPATIENT)
Dept: FAMILY MEDICINE | Facility: CLINIC | Age: 36
End: 2021-11-29
Payer: COMMERCIAL

## 2021-11-29 VITALS
BODY MASS INDEX: 37.02 KG/M2 | SYSTOLIC BLOOD PRESSURE: 132 MMHG | DIASTOLIC BLOOD PRESSURE: 84 MMHG | OXYGEN SATURATION: 97 % | WEIGHT: 258 LBS | HEART RATE: 106 BPM

## 2021-11-29 DIAGNOSIS — Z01.810 PRE-OPERATIVE CARDIOVASCULAR EXAMINATION: ICD-10-CM

## 2021-11-29 DIAGNOSIS — N18.32 STAGE 3B CHRONIC KIDNEY DISEASE (H): Primary | ICD-10-CM

## 2021-11-29 LAB
ANION GAP SERPL CALCULATED.3IONS-SCNC: 3 MMOL/L (ref 3–14)
BASOPHILS # BLD AUTO: 0.1 10E3/UL (ref 0–0.2)
BASOPHILS NFR BLD AUTO: 1 %
BUN SERPL-MCNC: 28 MG/DL (ref 7–30)
CALCIUM SERPL-MCNC: 9.5 MG/DL (ref 8.5–10.1)
CHLORIDE BLD-SCNC: 110 MMOL/L (ref 94–109)
CO2 SERPL-SCNC: 27 MMOL/L (ref 20–32)
CREAT SERPL-MCNC: 1.95 MG/DL (ref 0.66–1.25)
EOSINOPHIL # BLD AUTO: 1.2 10E3/UL (ref 0–0.7)
EOSINOPHIL NFR BLD AUTO: 13 %
ERYTHROCYTE [DISTWIDTH] IN BLOOD BY AUTOMATED COUNT: 14.2 % (ref 10–15)
GFR SERPL CREATININE-BSD FRML MDRD: 42 ML/MIN/1.73M2
GLUCOSE BLD-MCNC: 107 MG/DL (ref 70–99)
HCT VFR BLD AUTO: 48.8 % (ref 40–53)
HGB BLD-MCNC: 16 G/DL (ref 13.3–17.7)
LYMPHOCYTES # BLD AUTO: 2.2 10E3/UL (ref 0.8–5.3)
LYMPHOCYTES NFR BLD AUTO: 23 %
MCH RBC QN AUTO: 29.7 PG (ref 26.5–33)
MCHC RBC AUTO-ENTMCNC: 32.8 G/DL (ref 31.5–36.5)
MCV RBC AUTO: 91 FL (ref 78–100)
MONOCYTES # BLD AUTO: 0.9 10E3/UL (ref 0–1.3)
MONOCYTES NFR BLD AUTO: 9 %
NEUTROPHILS # BLD AUTO: 5.4 10E3/UL (ref 1.6–8.3)
NEUTROPHILS NFR BLD AUTO: 55 %
PLATELET # BLD AUTO: 318 10E3/UL (ref 150–450)
POTASSIUM BLD-SCNC: 4.6 MMOL/L (ref 3.4–5.3)
RBC # BLD AUTO: 5.39 10E6/UL (ref 4.4–5.9)
SARS-COV-2 RNA RESP QL NAA+PROBE: NEGATIVE
SODIUM SERPL-SCNC: 140 MMOL/L (ref 133–144)
WBC # BLD AUTO: 9.8 10E3/UL (ref 4–11)

## 2021-11-29 PROCEDURE — 80048 BASIC METABOLIC PNL TOTAL CA: CPT | Performed by: INTERNAL MEDICINE

## 2021-11-29 PROCEDURE — U0005 INFEC AGEN DETEC AMPLI PROBE: HCPCS | Performed by: INTERNAL MEDICINE

## 2021-11-29 PROCEDURE — 36415 COLL VENOUS BLD VENIPUNCTURE: CPT | Performed by: INTERNAL MEDICINE

## 2021-11-29 PROCEDURE — 85025 COMPLETE CBC W/AUTO DIFF WBC: CPT | Performed by: INTERNAL MEDICINE

## 2021-11-29 PROCEDURE — U0003 INFECTIOUS AGENT DETECTION BY NUCLEIC ACID (DNA OR RNA); SEVERE ACUTE RESPIRATORY SYNDROME CORONAVIRUS 2 (SARS-COV-2) (CORONAVIRUS DISEASE [COVID-19]), AMPLIFIED PROBE TECHNIQUE, MAKING USE OF HIGH THROUGHPUT TECHNOLOGIES AS DESCRIBED BY CMS-2020-01-R: HCPCS | Performed by: INTERNAL MEDICINE

## 2021-11-29 PROCEDURE — 99214 OFFICE O/P EST MOD 30 MIN: CPT | Performed by: INTERNAL MEDICINE

## 2021-11-29 NOTE — PROGRESS NOTES
[facility]  [address] [place] [address]  [place] [address]  Phone: [phone]  Primary Provider: Remington Morley  Pre-op Performing Provider: REMINGTON MORLEY      PREOPERATIVE EVALUATION:  Today's date: 11/29/2021    Abelino Gracia is a 36 year old male who presents for a preoperative evaluation.    Surgical Information:  Surgery/Procedure: Release Carpal Tunnel, Left and Right  Surgery Location: [facility]   Surgeon: Dr. Martinez  Surgery Date: 12/3/2021 (Left) and 12/17/2021 (Right)  Time of Surgery: TBD  Where patient plans to recover: At home with family  Fax number for surgical facility: Note does not need to be faxed, will be available electronically in Epic.    Type of Anesthesia Anticipated: General    Assessment & Plan     The proposed surgical procedure is considered LOW risk.    Problem List Items Addressed This Visit     CKD (chronic kidney disease) stage 3, GFR 30-59 ml/min (H) - Primary (Chronic)    Relevant Orders    OPTOMETRY REFERRAL    Basic metabolic panel  (Ca, Cl, CO2, Creat, Gluc, K, Na, BUN)    CBC with platelets and differential      Other Visit Diagnoses     Pre-operative cardiovascular examination        Relevant Orders    Asymptomatic COVID-19 Virus (Coronavirus) by PCR Nose                   Medication Instructions:  Patient is to take all scheduled medications on the day of surgery EXCEPT for modifications listed below:  1. Hold vicotza the morning of surgery  2.make sure you take propanolol  3. Hold losartan the morning of surgery      RECOMMENDATION:  APPROVAL GIVEN to proceed with proposed procedure, without further diagnostic evaluation.                      Subjective     HPI related to upcoming procedure: bilateral carpal tunnel     Preop Questions 11/29/2021   1. Have you ever had a heart attack or stroke? No   2. Have you ever had surgery on your heart or blood vessels, such as a stent placement, a coronary artery bypass, or surgery on an artery in  your head, neck, heart, or legs? No   3. Do you have chest pain with activity? No   4. Do you have a history of  heart failure? No   5. Do you currently have a cold, bronchitis or symptoms of other infection? No   6. Do you have a cough, shortness of breath, or wheezing? No   7. Do you or anyone in your family have previous history of blood clots? No   8. Do you or does anyone in your family have a serious bleeding problem such as prolonged bleeding following surgeries or cuts? No   9. Have you ever had problems with anemia or been told to take iron pills? No   10. Have you had any abnormal blood loss such as black, tarry or bloody stools? No   11. Have you ever had a blood transfusion? No   12. Are you willing to have a blood transfusion if it is medically needed before, during, or after your surgery? Yes   13. Have you or any of your relatives ever had problems with anesthesia? No   14. Do you have sleep apnea, excessive snoring or daytime drowsiness? YES - DESHAUN not on CPAP   14a. Do you have a CPAP machine? No   15. Do you have any artifical heart valves or other implanted medical devices like a pacemaker, defibrillator, or continuous glucose monitor? No   16. Do you have artificial joints? No   17. Are you allergic to latex? No       Health Care Directive:  Patient does not have a Health Care Directive or Living Will: Discussed advance care planning with patient; information given to patient to review.    Preoperative Review of :   reviewed - controlled substances reflected in medication list.          Review of Systems  CONSTITUTIONAL: NEGATIVE for fever, chills, change in weight  ENT/MOUTH: NEGATIVE for ear, mouth and throat problems  RESP: NEGATIVE for significant cough or SOB  CV: NEGATIVE for chest pain, palpitations or peripheral edema    Patient Active Problem List    Diagnosis Date Noted     Carpal tunnel syndrome of left wrist 11/22/2021     Priority: Medium     Carpal tunnel syndrome of right  wrist 11/22/2021     Priority: Medium     Tenosynovitis, de Quervain 09/22/2020     Priority: Medium     Secondary renal hyperparathyroidism (H) 08/21/2018     Priority: Medium     Morbid obesity (H) 08/01/2018     Priority: Medium     Hypophosphatemia 08/22/2016     Priority: Medium     BMI 31.0-31.9,adult 03/07/2016     Priority: Medium     QURESHI (nonalcoholic steatohepatitis) 02/22/2016     Priority: Medium     Abnormal LFTs. Normal ultrasound 2015. Liver biopsy 7/2016-  Minimal simple steatosis in otherwise unremarkable hepatic parenchyma. Negative for significant fibrosis; negative for necroinflammatory activity. Negative for chronic liver disease       CKD (chronic kidney disease) stage 3, GFR 30-59 ml/min (H) 05/13/2015     Priority: Medium     Leukocytosis 05/13/2015     Priority: Medium     With eosinophilia       Alcohol dependence in remission (H) 04/10/2014     Priority: Medium     sober since 2009       DESHAUN (obstructive sleep apnea)- mild (AHI 5)      Priority: Medium     Sleep study MSI 2010 (219#) Apnea-hypopnea index 5.4, RDI 14.6.  The lowest desaturation was 90%.  There were periodic limb movements with significant arousals with an index of 7.2.        Asthma, mild intermittent      Priority: Medium     Major depressive disorder      Priority: Medium     Insomnia      Priority: Medium     Migraine headache      Priority: Medium     Generalized anxiety disorder 09/21/2008     Priority: Medium      Past Medical History:   Diagnosis Date     Allergies     cats,dust, pollens     Depressive disorder 2017     Leukocytosis      Panic disorder without agoraphobia      Uncomplicated asthma 1990    Diagnosed as a child     Past Surgical History:   Procedure Laterality Date     APPENDECTOMY  08/25/2004     CHOLECYSTECTOMY       COLONOSCOPY N/A 11/14/2016    Procedure: COLONOSCOPY;  Surgeon: Mauro Tan MD;  Location: [facility]     IR RENAL BIOPSY LEFT  7/15/2020     TONSILLECTOMY  06/08/2006     [facility]  NONSPECIFIC PROCEDURE   Feb 2011     Laparoscopic cholecystectomy.     Current Outpatient Medications   Medication Sig Dispense Refill     acetaminophen (TYLENOL) 325 MG tablet Take 325 mg by mouth as needed        albuterol (PROAIR HFA) 108 (90 Base) MCG/ACT inhaler Inhale 2 puffs into the lungs every 4 hours as needed 2 Inhaler 3     blood glucose monitoring (SARA CONTOUR MONITOR) meter device kit Use to test blood sugars one time daily. Strips Ex: 7/31/2019, Lot: [id number], SN: [id number] 1 kit 0     blood glucose monitoring (SARA CONTOUR) test strip Use to test blood sugars one time daily 50 strip 11     blood glucose monitoring (SARA MICROLET) lancets Use to test blood sugar 1 times daily 100 each 11     buPROPion (WELLBUTRIN XL) 300 MG 24 hr tablet TAKE 1 TABLET BY MOUTH EVERY DAY 90 tablet 3     Calcium Carbonate Antacid (TUMS PO) Take by mouth as needed       cetirizine (ZYRTEC) 10 MG tablet Take 10 mg by mouth daily as needed        diphenhydrAMINE (BENADRYL) 25 MG tablet Take 50 mg by mouth nightly as needed for itching or allergies       Fexofenadine HCl (ALLEGRA PO) Take 60 mg by mouth daily as needed        fluticasone (FLONASE) 50 MCG/ACT nasal spray Spray 1-2 sprays into both nostrils daily as needed  1 Package 11     gabapentin (NEURONTIN) 600 MG tablet Take 1 tablet (600 mg) by mouth 3 times daily 270 tablet 4     losartan (COZAAR) 25 MG tablet Take 0.5 tablets (12.5 mg) by mouth daily To fill when current expires 45 tablet 3     melatonin 3 MG tablet Take 6 mg by mouth nightly as needed for sleep        propranolol ER (INDERAL LA) 60 MG 24 hr capsule TAKE 1 CAPSULE BY MOUTH EVERY DAY 90 capsule 0     VICTOZA PEN 18 MG/3ML soln ADMINISTER 0.6 MG UNDER THE SKIN DAILY 9 mL 0       Allergies   Allergen Reactions     Clarithromycin Hives     Penicillins      Sulfa Drugs      Topamax [Topiramate]      Decreased gfr and decrease phosphorous with use     Adhesive Tape Rash        Social History      Tobacco Use     Smoking status: Former Smoker     Packs/day: 1.00     Years: 20.00     Pack years: 20.00     Types: Cigarettes     Start date: 1998     Quit date: 3/5/2017     Years since quittin.7     Smokeless tobacco: Never Used     Tobacco comment: Have tried chantix   Substance Use Topics     Alcohol use: No     Alcohol/week: 0.0 standard drinks     Comment: SOBER SINCE 2009       History   Drug Use No         Objective     /84 (BP Location: Right arm, Patient Position: Chair, Cuff Size: Adult Large)   Pulse 106   Wt 117 kg (258 lb)   SpO2 97%   BMI 37.02 kg/m      Physical Exam  GENERAL APPEARANCE: healthy, alert and no distress  HENT: ear canals and TM's normal and nose and mouth without ulcers or lesions  RESP: lungs clear to auscultation - no rales, rhonchi or wheezes  CV: regular rate and rhythm, normal S1 S2, no S3 or S4 and no murmur, click or rub   ABDOMEN: soft, nontender, no HSM or masses and bowel sounds normal  NEURO: Normal strength and tone, sensory exam grossly normal, mentation intact and speech normal    Recent Labs   Lab Test 10/14/21  1523 21  0744 21  0734 07/15/21  1500 21  1007 20  0804 07/15/20  0830   HGB  --  17.1  --   --  15.8   < > 16.2   PLT  --  349  --   --   --   --  348   INR  --   --   --   --   --   --  1.05   NA  --   --  138 137 138   < >  --    POTASSIUM  --   --  4.8 4.0 4.2   < >  --    CR  --   --  2.25* 2.21* 2.15*   < >  --    A1C 5.9*  --   --  6.1* 6.0*   < >  --     < > = values in this interval not displayed.        Diagnostics:  Labs pending at this time.  Results will be reviewed when available.   No EKG required, no history of coronary heart disease, significant arrhythmia, peripheral arterial disease or other structural heart disease.    Revised Cardiac Risk Index (RCRI):  The patient has the following serious cardiovascular risks for perioperative complications:   - Serum Creatinine >2.0 mg/dl = 1 point      RCRI Interpretation: 1 point: Class II (low risk - 0.9% complication rate)           Signed Electronically by: Roberth Tavares MD  Copy of this evaluation report is provided to requesting physician.

## 2021-11-29 NOTE — H&P (VIEW-ONLY)
[facility]  [address] [place] [address]  [place] [address]  Phone: [phone]  Primary Provider: Remington Morley  Pre-op Performing Provider: REMINGTON MORLEY      PREOPERATIVE EVALUATION:  Today's date: 11/29/2021    Abelino Gracia is a 36 year old male who presents for a preoperative evaluation.    Surgical Information:  Surgery/Procedure: Release Carpal Tunnel, Left and Right  Surgery Location: [facility]   Surgeon: Dr. Martinez  Surgery Date: 12/3/2021 (Left) and 12/17/2021 (Right)  Time of Surgery: TBD  Where patient plans to recover: At home with family  Fax number for surgical facility: Note does not need to be faxed, will be available electronically in Epic.    Type of Anesthesia Anticipated: General    Assessment & Plan     The proposed surgical procedure is considered LOW risk.    Problem List Items Addressed This Visit     CKD (chronic kidney disease) stage 3, GFR 30-59 ml/min (H) - Primary (Chronic)    Relevant Orders    OPTOMETRY REFERRAL    Basic metabolic panel  (Ca, Cl, CO2, Creat, Gluc, K, Na, BUN)    CBC with platelets and differential      Other Visit Diagnoses     Pre-operative cardiovascular examination        Relevant Orders    Asymptomatic COVID-19 Virus (Coronavirus) by PCR Nose                   Medication Instructions:  Patient is to take all scheduled medications on the day of surgery EXCEPT for modifications listed below:  1. Hold vicotza the morning of surgery  2.make sure you take propanolol  3. Hold losartan the morning of surgery      RECOMMENDATION:  APPROVAL GIVEN to proceed with proposed procedure, without further diagnostic evaluation.                      Subjective     HPI related to upcoming procedure: bilateral carpal tunnel     Preop Questions 11/29/2021   1. Have you ever had a heart attack or stroke? No   2. Have you ever had surgery on your heart or blood vessels, such as a stent placement, a coronary artery bypass, or surgery on an artery in  your head, neck, heart, or legs? No   3. Do you have chest pain with activity? No   4. Do you have a history of  heart failure? No   5. Do you currently have a cold, bronchitis or symptoms of other infection? No   6. Do you have a cough, shortness of breath, or wheezing? No   7. Do you or anyone in your family have previous history of blood clots? No   8. Do you or does anyone in your family have a serious bleeding problem such as prolonged bleeding following surgeries or cuts? No   9. Have you ever had problems with anemia or been told to take iron pills? No   10. Have you had any abnormal blood loss such as black, tarry or bloody stools? No   11. Have you ever had a blood transfusion? No   12. Are you willing to have a blood transfusion if it is medically needed before, during, or after your surgery? Yes   13. Have you or any of your relatives ever had problems with anesthesia? No   14. Do you have sleep apnea, excessive snoring or daytime drowsiness? YES - DESHAUN not on CPAP   14a. Do you have a CPAP machine? No   15. Do you have any artifical heart valves or other implanted medical devices like a pacemaker, defibrillator, or continuous glucose monitor? No   16. Do you have artificial joints? No   17. Are you allergic to latex? No       Health Care Directive:  Patient does not have a Health Care Directive or Living Will: Discussed advance care planning with patient; information given to patient to review.    Preoperative Review of :   reviewed - controlled substances reflected in medication list.          Review of Systems  CONSTITUTIONAL: NEGATIVE for fever, chills, change in weight  ENT/MOUTH: NEGATIVE for ear, mouth and throat problems  RESP: NEGATIVE for significant cough or SOB  CV: NEGATIVE for chest pain, palpitations or peripheral edema    Patient Active Problem List    Diagnosis Date Noted     Carpal tunnel syndrome of left wrist 11/22/2021     Priority: Medium     Carpal tunnel syndrome of right  wrist 11/22/2021     Priority: Medium     Tenosynovitis, de Quervain 09/22/2020     Priority: Medium     Secondary renal hyperparathyroidism (H) 08/21/2018     Priority: Medium     Morbid obesity (H) 08/01/2018     Priority: Medium     Hypophosphatemia 08/22/2016     Priority: Medium     BMI 31.0-31.9,adult 03/07/2016     Priority: Medium     QURESHI (nonalcoholic steatohepatitis) 02/22/2016     Priority: Medium     Abnormal LFTs. Normal ultrasound 2015. Liver biopsy 7/2016-  Minimal simple steatosis in otherwise unremarkable hepatic parenchyma. Negative for significant fibrosis; negative for necroinflammatory activity. Negative for chronic liver disease       CKD (chronic kidney disease) stage 3, GFR 30-59 ml/min (H) 05/13/2015     Priority: Medium     Leukocytosis 05/13/2015     Priority: Medium     With eosinophilia       Alcohol dependence in remission (H) 04/10/2014     Priority: Medium     sober since 2009       DESHAUN (obstructive sleep apnea)- mild (AHI 5)      Priority: Medium     Sleep study MSI 2010 (219#) Apnea-hypopnea index 5.4, RDI 14.6.  The lowest desaturation was 90%.  There were periodic limb movements with significant arousals with an index of 7.2.        Asthma, mild intermittent      Priority: Medium     Major depressive disorder      Priority: Medium     Insomnia      Priority: Medium     Migraine headache      Priority: Medium     Generalized anxiety disorder 09/21/2008     Priority: Medium      Past Medical History:   Diagnosis Date     Allergies     cats,dust, pollens     Depressive disorder 2017     Leukocytosis      Panic disorder without agoraphobia      Uncomplicated asthma 1990    Diagnosed as a child     Past Surgical History:   Procedure Laterality Date     APPENDECTOMY  08/25/2004     CHOLECYSTECTOMY       COLONOSCOPY N/A 11/14/2016    Procedure: COLONOSCOPY;  Surgeon: Mauro Tan MD;  Location: [facility]     IR RENAL BIOPSY LEFT  7/15/2020     TONSILLECTOMY  06/08/2006     [facility]  NONSPECIFIC PROCEDURE   Feb 2011     Laparoscopic cholecystectomy.     Current Outpatient Medications   Medication Sig Dispense Refill     acetaminophen (TYLENOL) 325 MG tablet Take 325 mg by mouth as needed        albuterol (PROAIR HFA) 108 (90 Base) MCG/ACT inhaler Inhale 2 puffs into the lungs every 4 hours as needed 2 Inhaler 3     blood glucose monitoring (SARA CONTOUR MONITOR) meter device kit Use to test blood sugars one time daily. Strips Ex: 7/31/2019, Lot: [id number], SN: [id number] 1 kit 0     blood glucose monitoring (SARA CONTOUR) test strip Use to test blood sugars one time daily 50 strip 11     blood glucose monitoring (SARA MICROLET) lancets Use to test blood sugar 1 times daily 100 each 11     buPROPion (WELLBUTRIN XL) 300 MG 24 hr tablet TAKE 1 TABLET BY MOUTH EVERY DAY 90 tablet 3     Calcium Carbonate Antacid (TUMS PO) Take by mouth as needed       cetirizine (ZYRTEC) 10 MG tablet Take 10 mg by mouth daily as needed        diphenhydrAMINE (BENADRYL) 25 MG tablet Take 50 mg by mouth nightly as needed for itching or allergies       Fexofenadine HCl (ALLEGRA PO) Take 60 mg by mouth daily as needed        fluticasone (FLONASE) 50 MCG/ACT nasal spray Spray 1-2 sprays into both nostrils daily as needed  1 Package 11     gabapentin (NEURONTIN) 600 MG tablet Take 1 tablet (600 mg) by mouth 3 times daily 270 tablet 4     losartan (COZAAR) 25 MG tablet Take 0.5 tablets (12.5 mg) by mouth daily To fill when current expires 45 tablet 3     melatonin 3 MG tablet Take 6 mg by mouth nightly as needed for sleep        propranolol ER (INDERAL LA) 60 MG 24 hr capsule TAKE 1 CAPSULE BY MOUTH EVERY DAY 90 capsule 0     VICTOZA PEN 18 MG/3ML soln ADMINISTER 0.6 MG UNDER THE SKIN DAILY 9 mL 0       Allergies   Allergen Reactions     Clarithromycin Hives     Penicillins      Sulfa Drugs      Topamax [Topiramate]      Decreased gfr and decrease phosphorous with use     Adhesive Tape Rash        Social History      Tobacco Use     Smoking status: Former Smoker     Packs/day: 1.00     Years: 20.00     Pack years: 20.00     Types: Cigarettes     Start date: 1998     Quit date: 3/5/2017     Years since quittin.7     Smokeless tobacco: Never Used     Tobacco comment: Have tried chantix   Substance Use Topics     Alcohol use: No     Alcohol/week: 0.0 standard drinks     Comment: SOBER SINCE 2009       History   Drug Use No         Objective     /84 (BP Location: Right arm, Patient Position: Chair, Cuff Size: Adult Large)   Pulse 106   Wt 117 kg (258 lb)   SpO2 97%   BMI 37.02 kg/m      Physical Exam  GENERAL APPEARANCE: healthy, alert and no distress  HENT: ear canals and TM's normal and nose and mouth without ulcers or lesions  RESP: lungs clear to auscultation - no rales, rhonchi or wheezes  CV: regular rate and rhythm, normal S1 S2, no S3 or S4 and no murmur, click or rub   ABDOMEN: soft, nontender, no HSM or masses and bowel sounds normal  NEURO: Normal strength and tone, sensory exam grossly normal, mentation intact and speech normal    Recent Labs   Lab Test 10/14/21  1523 21  0744 21  0734 07/15/21  1500 21  1007 20  0804 07/15/20  0830   HGB  --  17.1  --   --  15.8   < > 16.2   PLT  --  349  --   --   --   --  348   INR  --   --   --   --   --   --  1.05   NA  --   --  138 137 138   < >  --    POTASSIUM  --   --  4.8 4.0 4.2   < >  --    CR  --   --  2.25* 2.21* 2.15*   < >  --    A1C 5.9*  --   --  6.1* 6.0*   < >  --     < > = values in this interval not displayed.        Diagnostics:  Labs pending at this time.  Results will be reviewed when available.   No EKG required, no history of coronary heart disease, significant arrhythmia, peripheral arterial disease or other structural heart disease.    Revised Cardiac Risk Index (RCRI):  The patient has the following serious cardiovascular risks for perioperative complications:   - Serum Creatinine >2.0 mg/dl = 1 point      RCRI Interpretation: 1 point: Class II (low risk - 0.9% complication rate)           Signed Electronically by: Roberth Tavares MD  Copy of this evaluation report is provided to requesting physician.

## 2021-11-29 NOTE — H&P (VIEW-ONLY)
[facility]  [address] [place] [address]  [place] [address]  Phone: [phone]  Primary Provider: Remington Morley  Pre-op Performing Provider: REMINGTON MORLEY      PREOPERATIVE EVALUATION:  Today's date: 11/29/2021    Abelino Gracia is a 36 year old male who presents for a preoperative evaluation.    Surgical Information:  Surgery/Procedure: Release Carpal Tunnel, Left and Right  Surgery Location: [facility]   Surgeon: Dr. Martinez  Surgery Date: 12/3/2021 (Left) and 12/17/2021 (Right)  Time of Surgery: TBD  Where patient plans to recover: At home with family  Fax number for surgical facility: Note does not need to be faxed, will be available electronically in Epic.    Type of Anesthesia Anticipated: General    Assessment & Plan     The proposed surgical procedure is considered LOW risk.    Problem List Items Addressed This Visit     CKD (chronic kidney disease) stage 3, GFR 30-59 ml/min (H) - Primary (Chronic)    Relevant Orders    OPTOMETRY REFERRAL    Basic metabolic panel  (Ca, Cl, CO2, Creat, Gluc, K, Na, BUN)    CBC with platelets and differential      Other Visit Diagnoses     Pre-operative cardiovascular examination        Relevant Orders    Asymptomatic COVID-19 Virus (Coronavirus) by PCR Nose                   Medication Instructions:  Patient is to take all scheduled medications on the day of surgery EXCEPT for modifications listed below:  1. Hold vicotza the morning of surgery  2.make sure you take propanolol  3. Hold losartan the morning of surgery      RECOMMENDATION:  APPROVAL GIVEN to proceed with proposed procedure, without further diagnostic evaluation.                      Subjective     HPI related to upcoming procedure: bilateral carpal tunnel     Preop Questions 11/29/2021   1. Have you ever had a heart attack or stroke? No   2. Have you ever had surgery on your heart or blood vessels, such as a stent placement, a coronary artery bypass, or surgery on an artery in  your head, neck, heart, or legs? No   3. Do you have chest pain with activity? No   4. Do you have a history of  heart failure? No   5. Do you currently have a cold, bronchitis or symptoms of other infection? No   6. Do you have a cough, shortness of breath, or wheezing? No   7. Do you or anyone in your family have previous history of blood clots? No   8. Do you or does anyone in your family have a serious bleeding problem such as prolonged bleeding following surgeries or cuts? No   9. Have you ever had problems with anemia or been told to take iron pills? No   10. Have you had any abnormal blood loss such as black, tarry or bloody stools? No   11. Have you ever had a blood transfusion? No   12. Are you willing to have a blood transfusion if it is medically needed before, during, or after your surgery? Yes   13. Have you or any of your relatives ever had problems with anesthesia? No   14. Do you have sleep apnea, excessive snoring or daytime drowsiness? YES - DESHAUN not on CPAP   14a. Do you have a CPAP machine? No   15. Do you have any artifical heart valves or other implanted medical devices like a pacemaker, defibrillator, or continuous glucose monitor? No   16. Do you have artificial joints? No   17. Are you allergic to latex? No       Health Care Directive:  Patient does not have a Health Care Directive or Living Will: Discussed advance care planning with patient; information given to patient to review.    Preoperative Review of :   reviewed - controlled substances reflected in medication list.          Review of Systems  CONSTITUTIONAL: NEGATIVE for fever, chills, change in weight  ENT/MOUTH: NEGATIVE for ear, mouth and throat problems  RESP: NEGATIVE for significant cough or SOB  CV: NEGATIVE for chest pain, palpitations or peripheral edema    Patient Active Problem List    Diagnosis Date Noted     Carpal tunnel syndrome of left wrist 11/22/2021     Priority: Medium     Carpal tunnel syndrome of right  wrist 11/22/2021     Priority: Medium     Tenosynovitis, de Quervain 09/22/2020     Priority: Medium     Secondary renal hyperparathyroidism (H) 08/21/2018     Priority: Medium     Morbid obesity (H) 08/01/2018     Priority: Medium     Hypophosphatemia 08/22/2016     Priority: Medium     BMI 31.0-31.9,adult 03/07/2016     Priority: Medium     QURESHI (nonalcoholic steatohepatitis) 02/22/2016     Priority: Medium     Abnormal LFTs. Normal ultrasound 2015. Liver biopsy 7/2016-  Minimal simple steatosis in otherwise unremarkable hepatic parenchyma. Negative for significant fibrosis; negative for necroinflammatory activity. Negative for chronic liver disease       CKD (chronic kidney disease) stage 3, GFR 30-59 ml/min (H) 05/13/2015     Priority: Medium     Leukocytosis 05/13/2015     Priority: Medium     With eosinophilia       Alcohol dependence in remission (H) 04/10/2014     Priority: Medium     sober since 2009       DESHAUN (obstructive sleep apnea)- mild (AHI 5)      Priority: Medium     Sleep study MSI 2010 (219#) Apnea-hypopnea index 5.4, RDI 14.6.  The lowest desaturation was 90%.  There were periodic limb movements with significant arousals with an index of 7.2.        Asthma, mild intermittent      Priority: Medium     Major depressive disorder      Priority: Medium     Insomnia      Priority: Medium     Migraine headache      Priority: Medium     Generalized anxiety disorder 09/21/2008     Priority: Medium      Past Medical History:   Diagnosis Date     Allergies     cats,dust, pollens     Depressive disorder 2017     Leukocytosis      Panic disorder without agoraphobia      Uncomplicated asthma 1990    Diagnosed as a child     Past Surgical History:   Procedure Laterality Date     APPENDECTOMY  08/25/2004     CHOLECYSTECTOMY       COLONOSCOPY N/A 11/14/2016    Procedure: COLONOSCOPY;  Surgeon: Mauro Tan MD;  Location: [facility]     IR RENAL BIOPSY LEFT  7/15/2020     TONSILLECTOMY  06/08/2006     [facility]  NONSPECIFIC PROCEDURE   Feb 2011     Laparoscopic cholecystectomy.     Current Outpatient Medications   Medication Sig Dispense Refill     acetaminophen (TYLENOL) 325 MG tablet Take 325 mg by mouth as needed        albuterol (PROAIR HFA) 108 (90 Base) MCG/ACT inhaler Inhale 2 puffs into the lungs every 4 hours as needed 2 Inhaler 3     blood glucose monitoring (SARA CONTOUR MONITOR) meter device kit Use to test blood sugars one time daily. Strips Ex: 7/31/2019, Lot: [id number], SN: [id number] 1 kit 0     blood glucose monitoring (SARA CONTOUR) test strip Use to test blood sugars one time daily 50 strip 11     blood glucose monitoring (SARA MICROLET) lancets Use to test blood sugar 1 times daily 100 each 11     buPROPion (WELLBUTRIN XL) 300 MG 24 hr tablet TAKE 1 TABLET BY MOUTH EVERY DAY 90 tablet 3     Calcium Carbonate Antacid (TUMS PO) Take by mouth as needed       cetirizine (ZYRTEC) 10 MG tablet Take 10 mg by mouth daily as needed        diphenhydrAMINE (BENADRYL) 25 MG tablet Take 50 mg by mouth nightly as needed for itching or allergies       Fexofenadine HCl (ALLEGRA PO) Take 60 mg by mouth daily as needed        fluticasone (FLONASE) 50 MCG/ACT nasal spray Spray 1-2 sprays into both nostrils daily as needed  1 Package 11     gabapentin (NEURONTIN) 600 MG tablet Take 1 tablet (600 mg) by mouth 3 times daily 270 tablet 4     losartan (COZAAR) 25 MG tablet Take 0.5 tablets (12.5 mg) by mouth daily To fill when current expires 45 tablet 3     melatonin 3 MG tablet Take 6 mg by mouth nightly as needed for sleep        propranolol ER (INDERAL LA) 60 MG 24 hr capsule TAKE 1 CAPSULE BY MOUTH EVERY DAY 90 capsule 0     VICTOZA PEN 18 MG/3ML soln ADMINISTER 0.6 MG UNDER THE SKIN DAILY 9 mL 0       Allergies   Allergen Reactions     Clarithromycin Hives     Penicillins      Sulfa Drugs      Topamax [Topiramate]      Decreased gfr and decrease phosphorous with use     Adhesive Tape Rash        Social History      Tobacco Use     Smoking status: Former Smoker     Packs/day: 1.00     Years: 20.00     Pack years: 20.00     Types: Cigarettes     Start date: 1998     Quit date: 3/5/2017     Years since quittin.7     Smokeless tobacco: Never Used     Tobacco comment: Have tried chantix   Substance Use Topics     Alcohol use: No     Alcohol/week: 0.0 standard drinks     Comment: SOBER SINCE 2009       History   Drug Use No         Objective     /84 (BP Location: Right arm, Patient Position: Chair, Cuff Size: Adult Large)   Pulse 106   Wt 117 kg (258 lb)   SpO2 97%   BMI 37.02 kg/m      Physical Exam  GENERAL APPEARANCE: healthy, alert and no distress  HENT: ear canals and TM's normal and nose and mouth without ulcers or lesions  RESP: lungs clear to auscultation - no rales, rhonchi or wheezes  CV: regular rate and rhythm, normal S1 S2, no S3 or S4 and no murmur, click or rub   ABDOMEN: soft, nontender, no HSM or masses and bowel sounds normal  NEURO: Normal strength and tone, sensory exam grossly normal, mentation intact and speech normal    Recent Labs   Lab Test 10/14/21  1523 21  0744 21  0734 07/15/21  1500 21  1007 20  0804 07/15/20  0830   HGB  --  17.1  --   --  15.8   < > 16.2   PLT  --  349  --   --   --   --  348   INR  --   --   --   --   --   --  1.05   NA  --   --  138 137 138   < >  --    POTASSIUM  --   --  4.8 4.0 4.2   < >  --    CR  --   --  2.25* 2.21* 2.15*   < >  --    A1C 5.9*  --   --  6.1* 6.0*   < >  --     < > = values in this interval not displayed.        Diagnostics:  Labs pending at this time.  Results will be reviewed when available.   No EKG required, no history of coronary heart disease, significant arrhythmia, peripheral arterial disease or other structural heart disease.    Revised Cardiac Risk Index (RCRI):  The patient has the following serious cardiovascular risks for perioperative complications:   - Serum Creatinine >2.0 mg/dl = 1 point      RCRI Interpretation: 1 point: Class II (low risk - 0.9% complication rate)           Signed Electronically by: Roberth Tavares MD  Copy of this evaluation report is provided to requesting physician.

## 2021-12-02 ENCOUNTER — ANESTHESIA EVENT (OUTPATIENT)
Dept: SURGERY | Facility: AMBULATORY SURGERY CENTER | Age: 36
End: 2021-12-02
Payer: COMMERCIAL

## 2021-12-03 ENCOUNTER — HOSPITAL ENCOUNTER (OUTPATIENT)
Facility: AMBULATORY SURGERY CENTER | Age: 36
End: 2021-12-03
Attending: ORTHOPAEDIC SURGERY | Admitting: ORTHOPAEDIC SURGERY
Payer: COMMERCIAL

## 2021-12-03 ENCOUNTER — ANESTHESIA (OUTPATIENT)
Dept: SURGERY | Facility: AMBULATORY SURGERY CENTER | Age: 36
End: 2021-12-03
Payer: COMMERCIAL

## 2021-12-03 VITALS
RESPIRATION RATE: 16 BRPM | SYSTOLIC BLOOD PRESSURE: 105 MMHG | DIASTOLIC BLOOD PRESSURE: 68 MMHG | HEART RATE: 92 BPM | OXYGEN SATURATION: 93 % | TEMPERATURE: 97.5 F

## 2021-12-03 DIAGNOSIS — G56.02 CARPAL TUNNEL SYNDROME OF LEFT WRIST: Primary | ICD-10-CM

## 2021-12-03 LAB — GLUCOSE BLDC GLUCOMTR-MCNC: 114 MG/DL (ref 70–99)

## 2021-12-03 PROCEDURE — G8907 PT DOC NO EVENTS ON DISCHARG: HCPCS

## 2021-12-03 PROCEDURE — G8918 PT W/O PREOP ORDER IV AB PRO: HCPCS

## 2021-12-03 PROCEDURE — 64721 CARPAL TUNNEL SURGERY: CPT | Mod: LT | Performed by: ORTHOPAEDIC SURGERY

## 2021-12-03 PROCEDURE — 64721 CARPAL TUNNEL SURGERY: CPT

## 2021-12-03 RX ORDER — FENTANYL CITRATE 50 UG/ML
25 INJECTION, SOLUTION INTRAMUSCULAR; INTRAVENOUS EVERY 5 MIN PRN
Status: DISCONTINUED | OUTPATIENT
Start: 2021-12-03 | End: 2021-12-04 | Stop reason: HOSPADM

## 2021-12-03 RX ORDER — LIDOCAINE HYDROCHLORIDE 20 MG/ML
INJECTION, SOLUTION INFILTRATION; PERINEURAL PRN
Status: DISCONTINUED | OUTPATIENT
Start: 2021-12-03 | End: 2021-12-03

## 2021-12-03 RX ORDER — ONDANSETRON 2 MG/ML
4 INJECTION INTRAMUSCULAR; INTRAVENOUS EVERY 30 MIN PRN
Status: DISCONTINUED | OUTPATIENT
Start: 2021-12-03 | End: 2021-12-04 | Stop reason: HOSPADM

## 2021-12-03 RX ORDER — ACETAMINOPHEN 325 MG/1
975 TABLET ORAL ONCE
Status: COMPLETED | OUTPATIENT
Start: 2021-12-03 | End: 2021-12-03

## 2021-12-03 RX ORDER — SODIUM CHLORIDE, SODIUM LACTATE, POTASSIUM CHLORIDE, CALCIUM CHLORIDE 600; 310; 30; 20 MG/100ML; MG/100ML; MG/100ML; MG/100ML
INJECTION, SOLUTION INTRAVENOUS CONTINUOUS
Status: DISCONTINUED | OUTPATIENT
Start: 2021-12-03 | End: 2021-12-04 | Stop reason: HOSPADM

## 2021-12-03 RX ORDER — GABAPENTIN 300 MG/1
300 CAPSULE ORAL
Status: DISCONTINUED | OUTPATIENT
Start: 2021-12-03 | End: 2021-12-04 | Stop reason: HOSPADM

## 2021-12-03 RX ORDER — KETOROLAC TROMETHAMINE 30 MG/ML
INJECTION, SOLUTION INTRAMUSCULAR; INTRAVENOUS PRN
Status: DISCONTINUED | OUTPATIENT
Start: 2021-12-03 | End: 2021-12-03

## 2021-12-03 RX ORDER — MEPERIDINE HYDROCHLORIDE 25 MG/ML
12.5 INJECTION INTRAMUSCULAR; INTRAVENOUS; SUBCUTANEOUS
Status: DISCONTINUED | OUTPATIENT
Start: 2021-12-03 | End: 2021-12-04 | Stop reason: HOSPADM

## 2021-12-03 RX ORDER — LIDOCAINE 40 MG/G
CREAM TOPICAL
Status: DISCONTINUED | OUTPATIENT
Start: 2021-12-03 | End: 2021-12-04 | Stop reason: HOSPADM

## 2021-12-03 RX ORDER — OXYCODONE HYDROCHLORIDE 5 MG/1
5 TABLET ORAL EVERY 4 HOURS PRN
Status: DISCONTINUED | OUTPATIENT
Start: 2021-12-03 | End: 2021-12-04 | Stop reason: HOSPADM

## 2021-12-03 RX ORDER — BUPIVACAINE HYDROCHLORIDE AND EPINEPHRINE 2.5; 5 MG/ML; UG/ML
INJECTION, SOLUTION INFILTRATION; PERINEURAL PRN
Status: DISCONTINUED | OUTPATIENT
Start: 2021-12-03 | End: 2021-12-03 | Stop reason: HOSPADM

## 2021-12-03 RX ORDER — HYDROCODONE BITARTRATE AND ACETAMINOPHEN 5; 325 MG/1; MG/1
1 TABLET ORAL EVERY 4 HOURS PRN
Qty: 10 TABLET | Refills: 0 | Status: SHIPPED | OUTPATIENT
Start: 2021-12-03 | End: 2022-03-21

## 2021-12-03 RX ORDER — CLINDAMYCIN PHOSPHATE 900 MG/50ML
900 INJECTION, SOLUTION INTRAVENOUS SEE ADMIN INSTRUCTIONS
Status: DISCONTINUED | OUTPATIENT
Start: 2021-12-03 | End: 2021-12-04 | Stop reason: HOSPADM

## 2021-12-03 RX ORDER — PROPOFOL 10 MG/ML
INJECTION, EMULSION INTRAVENOUS CONTINUOUS PRN
Status: DISCONTINUED | OUTPATIENT
Start: 2021-12-03 | End: 2021-12-03

## 2021-12-03 RX ORDER — CLINDAMYCIN PHOSPHATE 900 MG/50ML
900 INJECTION, SOLUTION INTRAVENOUS
Status: COMPLETED | OUTPATIENT
Start: 2021-12-03 | End: 2021-12-03

## 2021-12-03 RX ORDER — FENTANYL CITRATE 50 UG/ML
INJECTION, SOLUTION INTRAMUSCULAR; INTRAVENOUS PRN
Status: DISCONTINUED | OUTPATIENT
Start: 2021-12-03 | End: 2021-12-03

## 2021-12-03 RX ORDER — PROPOFOL 10 MG/ML
INJECTION, EMULSION INTRAVENOUS PRN
Status: DISCONTINUED | OUTPATIENT
Start: 2021-12-03 | End: 2021-12-03

## 2021-12-03 RX ORDER — ONDANSETRON 2 MG/ML
INJECTION INTRAMUSCULAR; INTRAVENOUS PRN
Status: DISCONTINUED | OUTPATIENT
Start: 2021-12-03 | End: 2021-12-03

## 2021-12-03 RX ORDER — FENTANYL CITRATE 50 UG/ML
25 INJECTION, SOLUTION INTRAMUSCULAR; INTRAVENOUS
Status: DISCONTINUED | OUTPATIENT
Start: 2021-12-03 | End: 2021-12-04 | Stop reason: HOSPADM

## 2021-12-03 RX ORDER — ONDANSETRON 4 MG/1
4 TABLET, ORALLY DISINTEGRATING ORAL EVERY 30 MIN PRN
Status: DISCONTINUED | OUTPATIENT
Start: 2021-12-03 | End: 2021-12-04 | Stop reason: HOSPADM

## 2021-12-03 RX ADMIN — ONDANSETRON 4 MG: 2 INJECTION INTRAMUSCULAR; INTRAVENOUS at 12:50

## 2021-12-03 RX ADMIN — FENTANYL CITRATE 25 MCG: 50 INJECTION, SOLUTION INTRAMUSCULAR; INTRAVENOUS at 12:17

## 2021-12-03 RX ADMIN — LIDOCAINE HYDROCHLORIDE 60 MG: 20 INJECTION, SOLUTION INFILTRATION; PERINEURAL at 12:16

## 2021-12-03 RX ADMIN — OXYCODONE HYDROCHLORIDE 5 MG: 5 TABLET ORAL at 13:23

## 2021-12-03 RX ADMIN — SODIUM CHLORIDE, SODIUM LACTATE, POTASSIUM CHLORIDE, CALCIUM CHLORIDE: 600; 310; 30; 20 INJECTION, SOLUTION INTRAVENOUS at 10:59

## 2021-12-03 RX ADMIN — SODIUM CHLORIDE, SODIUM LACTATE, POTASSIUM CHLORIDE, CALCIUM CHLORIDE: 600; 310; 30; 20 INJECTION, SOLUTION INTRAVENOUS at 12:16

## 2021-12-03 RX ADMIN — CLINDAMYCIN PHOSPHATE 900 MG: 900 INJECTION, SOLUTION INTRAVENOUS at 12:10

## 2021-12-03 RX ADMIN — PROPOFOL 100 MCG/KG/MIN: 10 INJECTION, EMULSION INTRAVENOUS at 12:16

## 2021-12-03 RX ADMIN — ACETAMINOPHEN 975 MG: 325 TABLET ORAL at 10:59

## 2021-12-03 RX ADMIN — KETOROLAC TROMETHAMINE 30 MG: 30 INJECTION, SOLUTION INTRAMUSCULAR; INTRAVENOUS at 12:50

## 2021-12-03 RX ADMIN — PROPOFOL 50 MG: 10 INJECTION, EMULSION INTRAVENOUS at 12:16

## 2021-12-03 ASSESSMENT — LIFESTYLE VARIABLES: TOBACCO_USE: 1

## 2021-12-03 NOTE — ANESTHESIA CARE TRANSFER NOTE
Patient: Abelino Gracia    Procedure: Procedure(s):  RELEASE, CARPAL TUNNEL       Diagnosis: Carpal tunnel syndrome of left wrist [G56.02]  Diagnosis Additional Information: No value filed.    Anesthesia Type:   IV Regional Anesthesia     Note:    Oropharynx: oropharynx clear of all foreign objects  Level of Consciousness: awake  Oxygen Supplementation: room air    Independent Airway: airway patency satisfactory and stable  Dentition: dentition unchanged  Vital Signs Stable: post-procedure vital signs reviewed and stable  Report to RN Given: handoff report given  Patient transferred to: Phase II    Handoff Report: Identifed the Patient, Identified the Reponsible Provider, Reviewed the pertinent medical history, Discussed the surgical course, Reviewed Intra-OP anesthesia mangement and issues during anesthesia, Set expectations for post-procedure period and Allowed opportunity for questions and acknowledgement of understanding      Vitals:  Vitals Value Taken Time   BP 97/56 12/03/21 1257   Temp 97.5  F (36.4  C) 12/03/21 1257   Pulse     Resp 16 12/03/21 1257   SpO2 94 % 12/03/21 1257       Electronically Signed By: INGA Tijerina CRNA  December 3, 2021  1:00 PM

## 2021-12-03 NOTE — ANESTHESIA PREPROCEDURE EVALUATION
Anesthesia Pre-Procedure Evaluation    Patient: Abelino Gracia   MRN: [id number] : 1985        Preoperative Diagnosis: Carpal tunnel syndrome of left wrist [G56.02]    Procedure : Procedure(s):  RELEASE, CARPAL TUNNEL          Past Medical History:   Diagnosis Date     Allergies     cats,dust, pollens     Depressive disorder      Leukocytosis      Panic disorder without agoraphobia      Uncomplicated asthma     Diagnosed as a child      Past Surgical History:   Procedure Laterality Date     APPENDECTOMY  2004     CHOLECYSTECTOMY       COLONOSCOPY N/A 2016    Procedure: COLONOSCOPY;  Surgeon: Mauro Tan MD;  Location: [facility]     IR RENAL BIOPSY LEFT  7/15/2020     TONSILLECTOMY  2006     ZZC NONSPECIFIC PROCEDURE   2011     Laparoscopic cholecystectomy.      Allergies   Allergen Reactions     Clarithromycin Hives     Penicillins      Sulfa Drugs      Topamax [Topiramate]      Decreased gfr and decrease phosphorous with use     Adhesive Tape Rash      Social History     Tobacco Use     Smoking status: Former Smoker     Packs/day: 1.00     Years: 20.00     Pack years: 20.00     Types: Cigarettes     Start date: 1998     Quit date: 3/5/2017     Years since quittin.7     Smokeless tobacco: Never Used     Tobacco comment: Have tried chantix   Substance Use Topics     Alcohol use: No     Alcohol/week: 0.0 standard drinks     Comment: SOBER SINCE 2009      Wt Readings from Last 1 Encounters:   21 117 kg (258 lb)        Anesthesia Evaluation            ROS/MED HX  ENT/Pulmonary:     (+) sleep apnea, doesn't use CPAP, tobacco use, Past use, Intermittent, asthma     Neurologic:     (+) migraines,     Cardiovascular:  - neg cardiovascular ROS     METS/Exercise Tolerance:     Hematologic: Comments: Leukocytosis      Musculoskeletal: Comment: B/L CTS, tenosynovitis de Quervain        GI/Hepatic: Comment: QURESHI    (+) liver disease,     Renal/Genitourinary:      (+) renal disease, type: CRI,     Endo: Comment: Secondary hyperparathyroidism, hypophosphatemia    (+) Obesity,     Psychiatric/Substance Use:     (+) psychiatric history anxiety, depression and other (comment) (Insomnia) alcohol abuse     Infectious Disease:  - neg infectious disease ROS     Malignancy:       Other:  - neg other ROS          Physical Exam    Airway        Mallampati: IV   TM distance: > 3 FB   Neck ROM: full   Mouth opening: > 3 cm    Respiratory Devices and Support         Dental  no notable dental history         Cardiovascular   cardiovascular exam normal       Rhythm and rate: regular and normal     Pulmonary   pulmonary exam normal                OUTSIDE LABS:  CBC:   Lab Results   Component Value Date    WBC 9.8 11/29/2021    WBC 9.8 09/17/2021    HGB 16.0 11/29/2021    HGB 17.1 09/17/2021    HCT 48.8 11/29/2021    HCT 51.4 09/17/2021     11/29/2021     09/17/2021     BMP:   Lab Results   Component Value Date     11/29/2021     09/17/2021    POTASSIUM 4.6 11/29/2021    POTASSIUM 4.8 09/17/2021    CHLORIDE 110 (H) 11/29/2021    CHLORIDE 107 09/17/2021    CO2 27 11/29/2021    CO2 24 09/17/2021    BUN 28 11/29/2021    BUN 41 (H) 09/17/2021    CR 1.95 (H) 11/29/2021    CR 2.25 (H) 09/17/2021     (H) 12/03/2021     (H) 11/29/2021     COAGS:   Lab Results   Component Value Date    INR 1.05 07/15/2020     POC:   Lab Results   Component Value Date     (H) 07/15/2020     HEPATIC:   Lab Results   Component Value Date    ALBUMIN 4.1 09/17/2021    PROTTOTAL 7.7 07/15/2021    ALT 67 07/15/2021    AST 32 07/15/2021     (H) 07/14/2016    ALKPHOS 116 07/15/2021    BILITOTAL 0.4 07/15/2021     OTHER:   Lab Results   Component Value Date    A1C 5.9 (H) 10/14/2021    WALT 9.5 11/29/2021    PHOS 3.1 09/17/2021    LIPASE 69 02/01/2011    AMYLASE 69 02/15/2011    TSH 1.13 09/17/2021    SED 7 09/17/2021       Anesthesia Plan    ASA Status:  2   NPO Status:   NPO Appropriate    Anesthesia Type: IV Regional Anesthesia.   Induction: N/a.   Maintenance: TIVA.        Consents    Anesthesia Plan(s) and associated risks, benefits, and realistic alternatives discussed. Questions answered and patient/representative(s) expressed understanding.    - Discussed:     - Discussed with:  Patient      - Extended Intubation/Ventilatory Support Discussed: No.      - Patient is DNR/DNI Status: No    Use of blood products discussed: No .     Postoperative Care    Pain management: IV analgesics, Oral pain medications.   PONV prophylaxis: Ondansetron (or other 5HT-3), Background Propofol Infusion     Comments:    Other Comments: IV regional anesthesia, standard ASA monitors, sedation, GA as back up, inadequate block, LA toxicity, airway injury, hypoventilation, hypoxemia, aspiration d/w patient, questions, concerns addressed.            Robert Owens MD

## 2021-12-03 NOTE — DISCHARGE INSTRUCTIONS
[facility]   Adult Discharge Orders & Instructions     For 24 hours after surgery    1. Get plenty of rest.  A responsible adult must stay with you for at least 24 hours after you leave the hospital.   2. Do not drive or use heavy equipment.  If you have weakness or tingling, don't drive or use heavy equipment until this feeling goes away.  3. Do not drink alcohol.  4. Avoid strenuous or risky activities.  Ask for help when climbing stairs.   5. You may feel lightheaded.  IF so, sit for a few minutes before standing.  Have someone help you get up.   6. If you have nausea (feel sick to your stomach): Drink only clear liquids such as apple juice, ginger ale, broth or 7-Up.  Rest may also help.  Be sure to drink enough fluids.  Move to a regular diet as you feel able.  7. You may have a slight fever. Call the doctor if your fever is over 100 F (37.7 C) (taken under the tongue) or lasts longer than 24 hours.  8. You may have a dry mouth, a sore throat, muscle aches or trouble sleeping.  These should go away after 24 hours.  9. Do not make important or legal decisions.     Call your doctor for any of the followin.  Signs of infection (fever, growing tenderness at the surgery site, a large amount of drainage or bleeding, severe pain, foul-smelling drainage, redness, swelling).    2. It has been over 8 to 10 hours since surgery and you are still not able to urinate (pass water).    3.  Headache for over 24 hours.    4.  Signs of Covid-19 infection (temperature over 100 degrees, shortness of breath, cough, loss of taste/smell, generalized body aches, persistent headache,                  chills, sore throat, nausea/vomiting/diarrhea).    To contact a doctor, call _____[phone]___________________________________

## 2021-12-03 NOTE — ANESTHESIA POSTPROCEDURE EVALUATION
Patient: Abelino Gracia    Procedure: Procedure(s):  RELEASE, CARPAL TUNNEL       Diagnosis:Carpal tunnel syndrome of left wrist [G56.02]  Diagnosis Additional Information: No value filed.    Anesthesia Type:  IV Regional Anesthesia    Note:  Disposition: Outpatient   Postop Pain Control: Uneventful            Sign Out: Well controlled pain   PONV: No   Neuro/Psych: Uneventful            Sign Out: Acceptable/Baseline neuro status   Airway/Respiratory: Uneventful            Sign Out: Acceptable/Baseline resp. status   CV/Hemodynamics: Uneventful            Sign Out: Acceptable CV status; No obvious hypovolemia; No obvious fluid overload   Other NRE: NONE   DID A NON-ROUTINE EVENT OCCUR?            Last vitals:  Vitals Value Taken Time   /68 12/03/21 1330   Temp 36.4  C (97.5  F) 12/03/21 1257   Pulse     Resp 16 12/03/21 1330   SpO2 93 % 12/03/21 1330       Electronically Signed By: Robert Owens MD  December 3, 2021  1:54 PM

## 2021-12-03 NOTE — OP NOTE
OPERATIVE REPORT    DATE OF SERVICE:  12/3/2021    PREOPERATIVE DIAGNOSIS  Left carpal tunnel syndrome.    POSTOPERATIVE DIAGNOSIS  Left carpal tunnel syndrome.    NAME OF OPERATION  1. Left carpal tunnel release.    SURGEON  Lauro Hdz MD    ASSISTANT: Isaiah Soler       SPECIMENS REMOVED: None    ESTIMATED BLOOD LOSS: Minimal    FINDINGS: Compressed median nerve in carpal tunnel    HISTORY  Abelino Gracia is a 36 year old male with severe left carpal tunnel syndrome.  He has positive EMG, positive Tinel.  He has failed conservative treatment, and presents now for left carpal tunnel release.  Risks, benefits, potential complications and alternatives were discussed.    SURGERY  After a smooth IV regional block, the patient's left arm was prepped and draped in sterile fashion.  A pause was performed for patient verification.   A longitudinal incision was made in the thenar palmar crease and carried down through subcutaneous tissue on the ulnar side of the palmaris longus fascia.  The transverse carpal ligament was identified and divided sharply.  Beneath this the median nerve was noted to be significantly narrowed.  Synovium was teased away from the nerve.  The nerve was now seen to be fully decompressed.  The wound was irrigated with saline, and subcutaneous tissue was injected with 0.25% Marcaine.  Skin edges were closed with interrupted 5-0 nylon suture.  Sterile dressings were applied.  A volar splint was applied.  The patient was taken to the recovery room in stable condition.    LAURO HDZ MD      MR#: [id number]   : 1985   ADMIT DATE: 12/3/2021

## 2021-12-13 ENCOUNTER — OFFICE VISIT (OUTPATIENT)
Dept: ORTHOPEDICS | Facility: CLINIC | Age: 36
End: 2021-12-13
Payer: COMMERCIAL

## 2021-12-13 VITALS
WEIGHT: 258 LBS | DIASTOLIC BLOOD PRESSURE: 79 MMHG | HEART RATE: 82 BPM | BODY MASS INDEX: 36.94 KG/M2 | SYSTOLIC BLOOD PRESSURE: 111 MMHG | HEIGHT: 70 IN | RESPIRATION RATE: 18 BRPM

## 2021-12-13 DIAGNOSIS — Z98.890 S/P CARPAL TUNNEL RELEASE: ICD-10-CM

## 2021-12-13 PROCEDURE — 99024 POSTOP FOLLOW-UP VISIT: CPT | Performed by: ORTHOPAEDIC SURGERY

## 2021-12-13 ASSESSMENT — MIFFLIN-ST. JEOR: SCORE: 2096.53

## 2021-12-13 NOTE — PROGRESS NOTES
Follow up left carpal tunnel release on 12/3/21.  Splint is removed.  Wound is healing well.   Sutures are removed.  He  has intermittent numbness of fingers.    Start scar massage with vitamin-E cream.  Use night splint for 4 weeks.    Resume activity as tolerated.  He will have right carpal tunnel release 12/17/21.

## 2021-12-13 NOTE — LETTER
12/13/2021         RE: Abelino Gracia  [address] [facility] [address]        Dear Colleague,    Thank you for referring your patient, Abelino Gracia, to the [facility]. Please see a copy of my visit note below.    Follow up left carpal tunnel release on 12/3/21.  Splint is removed.  Wound is healing well.   Sutures are removed.  He  has intermittent numbness of fingers.    Start scar massage with vitamin-E cream.  Use night splint for 4 weeks.    Resume activity as tolerated.  He will have right carpal tunnel release 12/17/21.      Again, thank you for allowing me to participate in the care of your patient.        Sincerely,        Lauro Martinez MD     DATE OF ADMISSION:  07/30/2020



DATE OF DISCHARGE:  08/03/2020



PRINCIPAL DIAGNOSIS:  Resolving community-acquired pneumonia.



SECONDARY DIAGNOSES:  

1. Coronary artery disease.

2. Diabetes mellitus, type 2.

3. Hypertension.

4. Dyslipidemia.

5. BPH.

6. Restless legs syndrome.

7. Peripheral neuropathy.

8. Improving deconditioning.



COMPLICATIONS:  None.



ADVERSE REACTIONS:  None.



PROCEDURES:  None.



CONSULTATIONS:  None except Physical Therapy and Occupational Therapy.



HOSPITAL COURSE:  The patient was admitted to [facility] with COVID

exposure and shortness of breath, but he tested negative.  He was diagnosed with

community-acquired pneumonia.  He was treated with IV antibiotics and steroids.  He

improved and was switched to oral medications and was transferred here for therapy.

Even on arrival, he was on room air, and he was doing well.  He worked with therapy

over the weekend and was doing well, but this morning, he stated that he was doing

well enough and he refused to participate with any further therapy.  He is currently

ambulating close to 450 feet without any assistance.  Discussed with nursing and

nursing feel that he is safe to go home.  I spoke with his son and informed him and

he is going to take care of his father at home.  Mr. Grove also refuses home health

to come by and check on him, but I advised him that if he changes his mind, I can

always arrange it from the office.  He just needs a prescription for Levaquin for 2

more days and he should be done with his __________, he is already on the last 2

days.  He also wants the lancets, which I am going to send all of them to [facility]. 



PHYSICAL EXAMINATION:

VITAL SIGNS:  On the day of discharge, the patient is afebrile, heart rate 62,

respirations 19, oxygen saturation 96% on room air, blood pressure 145/68. 

CARDIOVASCULAR:  S1 and S2 plus. 

RESPIRATORY:  Normal vesicular breath sounds. 

ABDOMEN:  Soft, nontender.  Bowel sounds heard in all quadrants. 

EXTREMITIES:  Without cyanosis or clubbing. 

CENTRAL NERVOUS SYSTEM:  Peripheral neuropathy, otherwise nonfocal.



DISCHARGE MEDICATIONS:  

1. Vitamin C 1000 mg daily.

2. Plavix 75 mg daily.

3. Proscar 5 mg daily.

4. Lasix 20 mg daily.

5. Gabapentin 600 mg in the morning, 600 noon, and 1200 at bedtime.

6. Amaryl 2 mg b.i.d.

7. Levaquin 750 mg daily for two more days.

8. Mirapex 0.25 mg b.i.d.

9. Crestor 10 mg daily.

10. Entresto 49/51 half a tablet b.i.d.

11. Zinc sulfate 220 mg daily.



DISCHARGE INSTRUCTIONS:  Heart healthy 1800 calorie ADA diet.  Activity as

tolerated.  The patient is to follow up in my office in 2 to 4 weeks.  He is to call

with any questions or concerns.  I spoke with the patient and his son, Trace Hastings in

detail and all questions answered. 







Job ID:  [id number]

## 2021-12-13 NOTE — NURSING NOTE
Sutures removed today post CTR. Wound is healing well. No drainage, redness of the skin, fever, or any further signs of infection.    Sonal Wallace MS, ATC  Certified Athletic Trainer

## 2021-12-16 ENCOUNTER — ANESTHESIA EVENT (OUTPATIENT)
Dept: SURGERY | Facility: AMBULATORY SURGERY CENTER | Age: 36
End: 2021-12-16
Payer: COMMERCIAL

## 2021-12-17 ENCOUNTER — HOSPITAL ENCOUNTER (OUTPATIENT)
Facility: AMBULATORY SURGERY CENTER | Age: 36
End: 2021-12-17
Attending: ORTHOPAEDIC SURGERY | Admitting: ORTHOPAEDIC SURGERY
Payer: COMMERCIAL

## 2021-12-17 ENCOUNTER — ANESTHESIA (OUTPATIENT)
Dept: SURGERY | Facility: AMBULATORY SURGERY CENTER | Age: 36
End: 2021-12-17
Payer: COMMERCIAL

## 2021-12-17 VITALS
BODY MASS INDEX: 37.02 KG/M2 | RESPIRATION RATE: 18 BRPM | SYSTOLIC BLOOD PRESSURE: 110 MMHG | TEMPERATURE: 98 F | WEIGHT: 258 LBS | DIASTOLIC BLOOD PRESSURE: 70 MMHG | OXYGEN SATURATION: 94 %

## 2021-12-17 DIAGNOSIS — G56.01 CARPAL TUNNEL SYNDROME OF RIGHT WRIST: ICD-10-CM

## 2021-12-17 DIAGNOSIS — Z98.890 S/P CARPAL TUNNEL RELEASE: Primary | ICD-10-CM

## 2021-12-17 PROCEDURE — 64721 CARPAL TUNNEL SURGERY: CPT | Mod: RT | Performed by: ORTHOPAEDIC SURGERY

## 2021-12-17 PROCEDURE — G8916 PT W IV AB GIVEN ON TIME: HCPCS

## 2021-12-17 PROCEDURE — G8907 PT DOC NO EVENTS ON DISCHARG: HCPCS

## 2021-12-17 PROCEDURE — 64721 CARPAL TUNNEL SURGERY: CPT | Mod: RT

## 2021-12-17 RX ORDER — BUPIVACAINE HYDROCHLORIDE 2.5 MG/ML
INJECTION, SOLUTION INFILTRATION; PERINEURAL PRN
Status: DISCONTINUED | OUTPATIENT
Start: 2021-12-17 | End: 2021-12-17 | Stop reason: HOSPADM

## 2021-12-17 RX ORDER — SODIUM CHLORIDE, SODIUM LACTATE, POTASSIUM CHLORIDE, CALCIUM CHLORIDE 600; 310; 30; 20 MG/100ML; MG/100ML; MG/100ML; MG/100ML
INJECTION, SOLUTION INTRAVENOUS CONTINUOUS
Status: DISCONTINUED | OUTPATIENT
Start: 2021-12-17 | End: 2021-12-18 | Stop reason: HOSPADM

## 2021-12-17 RX ORDER — CEFAZOLIN SODIUM 2 G/100ML
2 INJECTION, SOLUTION INTRAVENOUS
Status: COMPLETED | OUTPATIENT
Start: 2021-12-17 | End: 2021-12-17

## 2021-12-17 RX ORDER — ONDANSETRON 2 MG/ML
4 INJECTION INTRAMUSCULAR; INTRAVENOUS EVERY 30 MIN PRN
Status: DISCONTINUED | OUTPATIENT
Start: 2021-12-17 | End: 2021-12-18 | Stop reason: HOSPADM

## 2021-12-17 RX ORDER — HYDROCODONE BITARTRATE AND ACETAMINOPHEN 5; 325 MG/1; MG/1
1-2 TABLET ORAL EVERY 4 HOURS PRN
Qty: 10 TABLET | Refills: 0 | Status: SHIPPED | OUTPATIENT
Start: 2021-12-17 | End: 2022-03-21

## 2021-12-17 RX ORDER — ALBUTEROL SULFATE 0.83 MG/ML
2.5 SOLUTION RESPIRATORY (INHALATION) EVERY 4 HOURS PRN
Status: DISCONTINUED | OUTPATIENT
Start: 2021-12-17 | End: 2021-12-18 | Stop reason: HOSPADM

## 2021-12-17 RX ORDER — LIDOCAINE 40 MG/G
CREAM TOPICAL
Status: DISCONTINUED | OUTPATIENT
Start: 2021-12-17 | End: 2021-12-18 | Stop reason: HOSPADM

## 2021-12-17 RX ORDER — PROPOFOL 10 MG/ML
INJECTION, EMULSION INTRAVENOUS CONTINUOUS PRN
Status: DISCONTINUED | OUTPATIENT
Start: 2021-12-17 | End: 2021-12-17

## 2021-12-17 RX ORDER — LIDOCAINE HYDROCHLORIDE 5 MG/ML
INJECTION, SOLUTION INFILTRATION; PERINEURAL PRN
Status: DISCONTINUED | OUTPATIENT
Start: 2021-12-17 | End: 2021-12-17

## 2021-12-17 RX ORDER — MEPERIDINE HYDROCHLORIDE 25 MG/ML
12.5 INJECTION INTRAMUSCULAR; INTRAVENOUS; SUBCUTANEOUS
Status: DISCONTINUED | OUTPATIENT
Start: 2021-12-17 | End: 2021-12-18 | Stop reason: HOSPADM

## 2021-12-17 RX ORDER — ONDANSETRON 4 MG/1
4 TABLET, ORALLY DISINTEGRATING ORAL EVERY 30 MIN PRN
Status: DISCONTINUED | OUTPATIENT
Start: 2021-12-17 | End: 2021-12-18 | Stop reason: HOSPADM

## 2021-12-17 RX ORDER — LABETALOL HYDROCHLORIDE 5 MG/ML
10 INJECTION, SOLUTION INTRAVENOUS
Status: DISCONTINUED | OUTPATIENT
Start: 2021-12-17 | End: 2021-12-18 | Stop reason: HOSPADM

## 2021-12-17 RX ORDER — CEFAZOLIN SODIUM 2 G/100ML
2 INJECTION, SOLUTION INTRAVENOUS SEE ADMIN INSTRUCTIONS
Status: DISCONTINUED | OUTPATIENT
Start: 2021-12-17 | End: 2021-12-18 | Stop reason: HOSPADM

## 2021-12-17 RX ORDER — FENTANYL CITRATE 50 UG/ML
50 INJECTION, SOLUTION INTRAMUSCULAR; INTRAVENOUS EVERY 5 MIN PRN
Status: DISCONTINUED | OUTPATIENT
Start: 2021-12-17 | End: 2021-12-18 | Stop reason: HOSPADM

## 2021-12-17 RX ORDER — ACETAMINOPHEN 325 MG/1
975 TABLET ORAL ONCE
Status: COMPLETED | OUTPATIENT
Start: 2021-12-17 | End: 2021-12-17

## 2021-12-17 RX ORDER — KETOROLAC TROMETHAMINE 30 MG/ML
15 INJECTION, SOLUTION INTRAMUSCULAR; INTRAVENOUS EVERY 6 HOURS PRN
Status: DISCONTINUED | OUTPATIENT
Start: 2021-12-17 | End: 2021-12-18 | Stop reason: HOSPADM

## 2021-12-17 RX ORDER — ONDANSETRON 2 MG/ML
INJECTION INTRAMUSCULAR; INTRAVENOUS PRN
Status: DISCONTINUED | OUTPATIENT
Start: 2021-12-17 | End: 2021-12-17

## 2021-12-17 RX ORDER — FENTANYL CITRATE 50 UG/ML
50 INJECTION, SOLUTION INTRAMUSCULAR; INTRAVENOUS
Status: DISCONTINUED | OUTPATIENT
Start: 2021-12-17 | End: 2021-12-18 | Stop reason: HOSPADM

## 2021-12-17 RX ORDER — OXYCODONE HYDROCHLORIDE 5 MG/1
10 TABLET ORAL EVERY 4 HOURS PRN
Status: DISCONTINUED | OUTPATIENT
Start: 2021-12-17 | End: 2021-12-18 | Stop reason: HOSPADM

## 2021-12-17 RX ORDER — DIAZEPAM 10 MG/2ML
2.5 INJECTION, SOLUTION INTRAMUSCULAR; INTRAVENOUS
Status: DISCONTINUED | OUTPATIENT
Start: 2021-12-17 | End: 2021-12-18 | Stop reason: HOSPADM

## 2021-12-17 RX ADMIN — SODIUM CHLORIDE, SODIUM LACTATE, POTASSIUM CHLORIDE, CALCIUM CHLORIDE: 600; 310; 30; 20 INJECTION, SOLUTION INTRAVENOUS at 13:07

## 2021-12-17 RX ADMIN — ACETAMINOPHEN 975 MG: 325 TABLET ORAL at 12:19

## 2021-12-17 RX ADMIN — ONDANSETRON 4 MG: 2 INJECTION INTRAMUSCULAR; INTRAVENOUS at 13:26

## 2021-12-17 RX ADMIN — PROPOFOL 75 MCG/KG/MIN: 10 INJECTION, EMULSION INTRAVENOUS at 13:18

## 2021-12-17 RX ADMIN — CEFAZOLIN SODIUM 2 G: 2 INJECTION, SOLUTION INTRAVENOUS at 13:14

## 2021-12-17 RX ADMIN — OXYCODONE HYDROCHLORIDE 5 MG: 5 TABLET ORAL at 14:14

## 2021-12-17 RX ADMIN — Medication 40 MCG: at 13:07

## 2021-12-17 RX ADMIN — LIDOCAINE HYDROCHLORIDE 48 ML: 5 INJECTION, SOLUTION INFILTRATION; PERINEURAL at 13:16

## 2021-12-17 NOTE — ANESTHESIA PREPROCEDURE EVALUATION
Anesthesia Pre-Procedure Evaluation    Patient: Abelino Gracia   MRN: [id number] : 1985        Preoperative Diagnosis: Carpal tunnel syndrome of right wrist [G56.01]    Procedure : Procedure(s):  RELEASE, CARPAL TUNNEL          Past Medical History:   Diagnosis Date     Allergies     cats,dust, pollens     Depressive disorder      Leukocytosis      Panic disorder without agoraphobia      Uncomplicated asthma     Diagnosed as a child      Past Surgical History:   Procedure Laterality Date     APPENDECTOMY  2004     CARPAL TUNNEL RELEASE RT/LT Left 2021    DML     CHOLECYSTECTOMY       COLONOSCOPY N/A 2016    Procedure: COLONOSCOPY;  Surgeon: Mauro Tan MD;  Location: [facility]     IR RENAL BIOPSY LEFT  07/15/2020     TONSILLECTOMY  2006     ZZC NONSPECIFIC PROCEDURE  2011     Laparoscopic cholecystectomy.      Allergies   Allergen Reactions     Clarithromycin Hives     Penicillins      Sulfa Drugs      Topamax [Topiramate]      Decreased gfr and decrease phosphorous with use     Adhesive Tape Rash      Social History     Tobacco Use     Smoking status: Former Smoker     Packs/day: 1.00     Years: 20.00     Pack years: 20.00     Types: Cigarettes     Start date: 1998     Quit date: 3/5/2017     Years since quittin.7     Smokeless tobacco: Never Used     Tobacco comment: Have tried chantix   Substance Use Topics     Alcohol use: No     Alcohol/week: 0.0 standard drinks     Comment: SOBER SINCE 2009      Wt Readings from Last 1 Encounters:   21 117 kg (258 lb)        Anesthesia Evaluation            ROS/MED HX  ENT/Pulmonary:     (+) sleep apnea, asthma     Neurologic:     (+) migraines,     Cardiovascular:       METS/Exercise Tolerance:     Hematologic:       Musculoskeletal:       GI/Hepatic: Comment: QURESHI      Renal/Genitourinary: Comment: Renal hyperparathyroidism      Endo:     (+) Obesity,     Psychiatric/Substance Use:     (+) alcohol  abuse     Infectious Disease:       Malignancy:       Other:            Physical Exam    Airway  airway exam normal      Mallampati: II   TM distance: > 3 FB   Neck ROM: full   Mouth opening: > 3 cm    Respiratory Devices and Support         Dental  no notable dental history         Cardiovascular          Rhythm and rate: regular and normal     Pulmonary   pulmonary exam normal        breath sounds clear to auscultation           OUTSIDE LABS:  CBC:   Lab Results   Component Value Date    WBC 9.8 11/29/2021    WBC 9.8 09/17/2021    HGB 16.0 11/29/2021    HGB 17.1 09/17/2021    HCT 48.8 11/29/2021    HCT 51.4 09/17/2021     11/29/2021     09/17/2021     BMP:   Lab Results   Component Value Date     11/29/2021     09/17/2021    POTASSIUM 4.6 11/29/2021    POTASSIUM 4.8 09/17/2021    CHLORIDE 110 (H) 11/29/2021    CHLORIDE 107 09/17/2021    CO2 27 11/29/2021    CO2 24 09/17/2021    BUN 28 11/29/2021    BUN 41 (H) 09/17/2021    CR 1.95 (H) 11/29/2021    CR 2.25 (H) 09/17/2021     (H) 12/03/2021     (H) 11/29/2021     COAGS:   Lab Results   Component Value Date    INR 1.05 07/15/2020     POC:   Lab Results   Component Value Date     (H) 07/15/2020     HEPATIC:   Lab Results   Component Value Date    ALBUMIN 4.1 09/17/2021    PROTTOTAL 7.7 07/15/2021    ALT 67 07/15/2021    AST 32 07/15/2021     (H) 07/14/2016    ALKPHOS 116 07/15/2021    BILITOTAL 0.4 07/15/2021     OTHER:   Lab Results   Component Value Date    A1C 5.9 (H) 10/14/2021    WALT 9.5 11/29/2021    PHOS 3.1 09/17/2021    LIPASE 69 02/01/2011    AMYLASE 69 02/15/2011    TSH 1.13 09/17/2021    SED 7 09/17/2021       Anesthesia Plan    ASA Status:  3   NPO Status:  NPO Appropriate    Anesthesia Type: IV Regional Anesthesia.   Induction: Intravenous.   Maintenance: TIVA.        Consents    Anesthesia Plan(s) and associated risks, benefits, and realistic alternatives discussed. Questions answered and  patient/representative(s) expressed understanding.    - Discussed:     - Discussed with:  Patient      - Extended Intubation/Ventilatory Support Discussed: No.      - Patient is DNR/DNI Status: No    Use of blood products discussed: No .     Postoperative Care    Pain management: IV analgesics, Oral pain medications, Multi-modal analgesia.   PONV prophylaxis: Ondansetron (or other 5HT-3), Background Propofol Infusion     Comments:                Harshad Flanagan MD

## 2021-12-17 NOTE — ANESTHESIA POSTPROCEDURE EVALUATION
Patient: Abelino Gracia    Procedure: Procedure(s):  RELEASE, CARPAL TUNNEL       Diagnosis:Carpal tunnel syndrome of right wrist [G56.01]  Diagnosis Additional Information: No value filed.    Anesthesia Type:  IV Regional Anesthesia    Note:  Disposition: Outpatient   Postop Pain Control: Uneventful            Sign Out: Well controlled pain   PONV: No   Neuro/Psych: Uneventful            Sign Out: Acceptable/Baseline neuro status   Airway/Respiratory: Uneventful            Sign Out: Acceptable/Baseline resp. status   CV/Hemodynamics: Uneventful            Sign Out: Acceptable CV status   Other NRE: NONE   DID A NON-ROUTINE EVENT OCCUR? No           Last vitals:  Vitals Value Taken Time   /70 12/17/21 1414   Temp     Pulse     Resp 18 12/17/21 1414   SpO2 94 % 12/17/21 1414       Electronically Signed By: Harshad Flanagan MD  December 17, 2021  3:03 PM

## 2021-12-17 NOTE — DISCHARGE INSTRUCTIONS
[facility]   Adult Discharge Orders & Instructions     For 24 hours after surgery    1. Get plenty of rest.  A responsible adult must stay with you for at least 24 hours after you leave the hospital.   2. Do not drive or use heavy equipment.  If you have weakness or tingling, don't drive or use heavy equipment until this feeling goes away.  3. Do not drink alcohol.  4. Avoid strenuous or risky activities.  Ask for help when climbing stairs.   5. You may feel lightheaded.  IF so, sit for a few minutes before standing.  Have someone help you get up.   6. If you have nausea (feel sick to your stomach): Drink only clear liquids such as apple juice, ginger ale, broth or 7-Up.  Rest may also help.  Be sure to drink enough fluids.  Move to a regular diet as you feel able.  7. You may have a slight fever. Call the doctor if your fever is over 100 F (37.7 C) (taken under the tongue) or lasts longer than 24 hours.  8. You may have a dry mouth, a sore throat, muscle aches or trouble sleeping.  These should go away after 24 hours.  9. Do not make important or legal decisions.     Call your doctor for any of the followin.  Signs of infection (fever, growing tenderness at the surgery site, a large amount of drainage or bleeding, severe pain, foul-smelling drainage, redness, swelling).    2. It has been over 8 to 10 hours since surgery and you are still not able to urinate (pass water).    3.  Headache for over 24 hours.                  4. Signs of Covid-19 infection (temperature over 100 degrees, shortness of breath, cough, loss of taste/smell, generalized body aches, persistent headache,                  chills, sore throat, nausea/vomiting/diarrhea).    To contact a doctor, call   [phone]    **Acetaminophen (Tylenol)  975mg taken at 12:15pm.

## 2021-12-17 NOTE — ANESTHESIA CARE TRANSFER NOTE
Patient: Abelino Gracia    Procedure: Procedure(s):  RELEASE, CARPAL TUNNEL       Diagnosis: Carpal tunnel syndrome of right wrist [G56.01]  Diagnosis Additional Information: No value filed.    Anesthesia Type:   IV Regional Anesthesia     Note:    Oropharynx: oropharynx clear of all foreign objects  Level of Consciousness: drowsy  Oxygen Supplementation: room air    Independent Airway: airway patency satisfactory and stable  Dentition: dentition unchanged  Vital Signs Stable: post-procedure vital signs reviewed and stable  Report to RN Given: handoff report given  Patient transferred to: Phase II    Handoff Report: Identifed the Patient, Identified the Reponsible Provider, Reviewed the pertinent medical history, Discussed the surgical course, Reviewed Intra-OP anesthesia mangement and issues during anesthesia, Set expectations for post-procedure period and Allowed opportunity for questions and acknowledgement of understanding      Vitals:  Vitals Value Taken Time   BP     Temp     Pulse     Resp     SpO2         Electronically Signed By: INGA EL CRNA  December 17, 2021  1:51 PM

## 2021-12-17 NOTE — OP NOTE
OPERATIVE REPORT    DATE OF SERVICE:  2021    PREOPERATIVE DIAGNOSIS  Right carpal tunnel syndrome     POSTOPERATIVE DIAGNOSIS  Right carpal tunnel syndrome    NAME OF OPERATION  1. Right carpal tunnel release     SURGEON  Lauro Hdz MD    ASSISTANT: Abdoul Townsend PA-C      SPECIMENS REMOVED: None    ESTIMATED BLOOD LOSS: Minimal    FINDINGS: Compressed median nerve in carpal tunnel    HISTORY  Abelino Gracia is a 36 year old male with severe right carpal tunnel syndrome.  He has positive EMG, positive Tinel.  He has failed conservative treatment, and presents now for right carpal tunnel release.  Risks, benefits, potential complications and alternatives were discussed.    SURGERY  After a smooth IV regional block, the patient's right arm was prepped and draped in sterile fashion.  A pause was performed for patient verification.   A longitudinal incision was made in the thenar palmar crease and carried down through subcutaneous tissue on the ulnar side of the palmaris longus fascia.  The transverse carpal ligament was identified and divided sharply.  Beneath this the median nerve was noted to be significantly narrowed.  Synovium was teased away from the nerve.  The nerve was now seen to be fully decompressed.  The wound was irrigated with saline, and subcutaneous tissue was injected with 0.25% Marcaine.  Skin edges were closed with interrupted 5-0 nylon suture.  Sterile dressings were applied.  A volar splint was applied.  The patient was taken to the recovery room in stable condition.    LAURO HDZ MD      MR#: [id number]   : 1985   ADMIT DATE: 2021  \

## 2021-12-17 NOTE — BRIEF OP NOTE
[facility]    Brief Operative Note    Pre-operative diagnosis: Carpal tunnel syndrome of right wrist [G56.01]  Post-operative diagnosis Same as pre-operative diagnosis    Procedure: Procedure(s):  RELEASE, CARPAL TUNNEL  Surgeon: Surgeon(s) and Role:     * Lauro Martinez MD - Primary       Abdoul Townsend PA-C - Assistant  Anesthesia: Lizet Block   Estimated Blood Loss: Minimal    Drains: None  Specimens: * No specimens in log *  Findings:   median nerve impingement.  Complications: None.  Implants: * No implants in log *

## 2021-12-23 ENCOUNTER — MYC REFILL (OUTPATIENT)
Dept: FAMILY MEDICINE | Facility: CLINIC | Age: 36
End: 2021-12-23
Payer: COMMERCIAL

## 2021-12-23 DIAGNOSIS — E11.9 TYPE 2 DIABETES, HBA1C GOAL < 8% (H): ICD-10-CM

## 2021-12-24 RX ORDER — LIRAGLUTIDE 6 MG/ML
0.6 INJECTION SUBCUTANEOUS DAILY
Qty: 9 ML | Refills: 0 | Status: SHIPPED | OUTPATIENT
Start: 2021-12-24 | End: 2022-03-11

## 2021-12-24 NOTE — TELEPHONE ENCOUNTER
"Routing refill request to provider for review/approval because:  Labs out of range:  CR      Requested Prescriptions   Pending Prescriptions Disp Refills     liraglutide (VICTOZA PEN) 18 MG/3ML solution 9 mL 0       GLP-1 Agonists Protocol Failed - 12/24/2021  8:19 AM        Failed - Normal serum creatinine on file in past 12 months     Recent Labs   Lab Test 11/29/21  1030   CR 1.95*       Ok to refill medication if creatinine is low          Passed - HgbA1C in past 3 or 6 months     If HgbA1C is 8 or greater, it needs to be on file within the past 3 months.  If less than 8, must be on file within the past 6 months.     Recent Labs   Lab Test 10/14/21  1523   A1C 5.9*             Passed - Medication is active on med list        Passed - Patient is age 18 or older        Passed - Recent (6 mo) or future (30 days) visit within the authorizing provider's specialty     Patient had office visit in the last 6 months or has a visit in the next 30 days with authorizing provider.  See \"Patient Info\" tab in inbasket, or \"Choose Columns\" in Meds & Orders section of the refill encounter.               Azalia Castillo RN on 12/24/2021 at 11:20 AM    "

## 2021-12-27 ENCOUNTER — OFFICE VISIT (OUTPATIENT)
Dept: ORTHOPEDICS | Facility: CLINIC | Age: 36
End: 2021-12-27
Payer: COMMERCIAL

## 2021-12-27 VITALS — BODY MASS INDEX: 36.94 KG/M2 | WEIGHT: 258 LBS | HEIGHT: 70 IN

## 2021-12-27 DIAGNOSIS — G56.01 CARPAL TUNNEL SYNDROME OF RIGHT WRIST: Primary | ICD-10-CM

## 2021-12-27 DIAGNOSIS — Z98.890 S/P CARPAL TUNNEL RELEASE: ICD-10-CM

## 2021-12-27 PROCEDURE — 99024 POSTOP FOLLOW-UP VISIT: CPT | Performed by: ORTHOPAEDIC SURGERY

## 2021-12-27 ASSESSMENT — MIFFLIN-ST. JEOR: SCORE: 2096.53

## 2021-12-27 NOTE — LETTER
12/27/2021         RE: Abelino Gracia  [address] [facility] [address]        Dear Colleague,    Thank you for referring your patient, Abelino Gracia, to the [facility]. Please see a copy of my visit note below.    Follow up right carpal tunnel release on 12/17/21.  Also had left carpal tunnel release 12/3/21.  The splint caused blistering on his forearm.  This was a blood blister and is now resolving.  Wound is healing well.   Sutures are removed.  He  has no numbness of fingers.    Start scar massage with vitamin-E cream.  Use night splint for 4 weeks.    Resume activity as tolerated.  Return to clinic 4 weeks        Again, thank you for allowing me to participate in the care of your patient.        Sincerely,        Lauro Martinez MD

## 2021-12-27 NOTE — NURSING NOTE
Sutures removed today. Wound is healing well. No drainage, redness of the skin, fever, or any further signs of infection. The plaster post surgical splint caused blistering on his forearm.  This was a blood blister and is now resolving. B;isters were covered.    Sonal Wallace MS, ATC  Certified Athletic Trainer

## 2022-01-03 DIAGNOSIS — N05.1 FSGS (FOCAL SEGMENTAL GLOMERULOSCLEROSIS): ICD-10-CM

## 2022-01-03 RX ORDER — LOSARTAN POTASSIUM 25 MG/1
12.5 TABLET ORAL DAILY
Qty: 45 TABLET | Refills: 3 | Status: SHIPPED | OUTPATIENT
Start: 2022-01-03 | End: 2022-09-22

## 2022-01-15 ENCOUNTER — HEALTH MAINTENANCE LETTER (OUTPATIENT)
Age: 37
End: 2022-01-15

## 2022-03-10 DIAGNOSIS — E11.9 TYPE 2 DIABETES, HBA1C GOAL < 8% (H): ICD-10-CM

## 2022-03-11 RX ORDER — LIRAGLUTIDE 6 MG/ML
INJECTION SUBCUTANEOUS
Qty: 9 ML | Refills: 0 | Status: SHIPPED | OUTPATIENT
Start: 2022-03-11 | End: 2022-06-09

## 2022-03-11 NOTE — TELEPHONE ENCOUNTER
Routing refill request to provider for review/approval because:  Labs out of range:    Creatinine   Date Value Ref Range Status   11/29/2021 1.95 (H) 0.66 - 1.25 mg/dL Final   03/04/2021 2.15 (H) 0.66 - 1.25 mg/dL Final

## 2022-03-21 ENCOUNTER — OFFICE VISIT (OUTPATIENT)
Dept: NEPHROLOGY | Facility: CLINIC | Age: 37
End: 2022-03-21
Payer: COMMERCIAL

## 2022-03-21 ENCOUNTER — LAB (OUTPATIENT)
Dept: LAB | Facility: CLINIC | Age: 37
End: 2022-03-21
Payer: COMMERCIAL

## 2022-03-21 VITALS
HEIGHT: 70 IN | HEART RATE: 108 BPM | SYSTOLIC BLOOD PRESSURE: 128 MMHG | OXYGEN SATURATION: 98 % | BODY MASS INDEX: 35.89 KG/M2 | WEIGHT: 250.7 LBS | DIASTOLIC BLOOD PRESSURE: 87 MMHG

## 2022-03-21 DIAGNOSIS — K75.81 NASH (NONALCOHOLIC STEATOHEPATITIS): ICD-10-CM

## 2022-03-21 DIAGNOSIS — N18.31 STAGE 3A CHRONIC KIDNEY DISEASE (H): ICD-10-CM

## 2022-03-21 DIAGNOSIS — N05.1 FSGS (FOCAL SEGMENTAL GLOMERULOSCLEROSIS): Primary | ICD-10-CM

## 2022-03-21 DIAGNOSIS — N25.81 SECONDARY RENAL HYPERPARATHYROIDISM (H): ICD-10-CM

## 2022-03-21 LAB
ALBUMIN SERPL-MCNC: 3.6 G/DL (ref 3.4–5)
ANION GAP SERPL CALCULATED.3IONS-SCNC: 7 MMOL/L (ref 3–14)
BUN SERPL-MCNC: 31 MG/DL (ref 7–30)
CALCIUM SERPL-MCNC: 9.7 MG/DL (ref 8.5–10.1)
CHLORIDE BLD-SCNC: 109 MMOL/L (ref 94–109)
CO2 SERPL-SCNC: 23 MMOL/L (ref 20–32)
CREAT SERPL-MCNC: 1.98 MG/DL (ref 0.66–1.25)
CREAT UR-MCNC: 103 MG/DL
GFR SERPL CREATININE-BSD FRML MDRD: 43 ML/MIN/1.73M2
GLUCOSE BLD-MCNC: 184 MG/DL (ref 70–99)
HGB BLD-MCNC: 16.5 G/DL (ref 13.3–17.7)
PHOSPHATE SERPL-MCNC: 2.2 MG/DL (ref 2.5–4.5)
POTASSIUM BLD-SCNC: 4.1 MMOL/L (ref 3.4–5.3)
PROT UR-MCNC: 0.26 G/L
PROT/CREAT 24H UR: 0.25 G/G CR (ref 0–0.2)
PTH-INTACT SERPL-MCNC: 36 PG/ML (ref 18–80)
SODIUM SERPL-SCNC: 139 MMOL/L (ref 133–144)

## 2022-03-21 PROCEDURE — 85018 HEMOGLOBIN: CPT

## 2022-03-21 PROCEDURE — 84156 ASSAY OF PROTEIN URINE: CPT

## 2022-03-21 PROCEDURE — 36415 COLL VENOUS BLD VENIPUNCTURE: CPT

## 2022-03-21 PROCEDURE — 80069 RENAL FUNCTION PANEL: CPT

## 2022-03-21 PROCEDURE — 99214 OFFICE O/P EST MOD 30 MIN: CPT | Performed by: INTERNAL MEDICINE

## 2022-03-21 PROCEDURE — 83970 ASSAY OF PARATHORMONE: CPT

## 2022-03-21 NOTE — NURSING NOTE
"Abelino Gracia's goals for this visit include:   Chief Complaint   Patient presents with     RECHECK     1 year follow-up       PCP: Roberth Tavares    Referring Provider:  No referring provider defined for this encounter.      Initial /87 (BP Location: Left arm, Patient Position: Sitting)   Pulse 108   Ht 1.778 m (5' 10\")   Wt 113.7 kg (250 lb 11.2 oz)   SpO2 98%   BMI 35.97 kg/m   Estimated body mass index is 35.97 kg/m  as calculated from the following:    Height as of this encounter: 1.778 m (5' 10\").    Weight as of this encounter: 113.7 kg (250 lb 11.2 oz).    Medication Reconciliation: complete    Do you need any medication refills at today's visit? none      PETRA Melgar [facility]"

## 2022-04-11 ASSESSMENT — ANXIETY QUESTIONNAIRES
GAD7 TOTAL SCORE: 15
4. TROUBLE RELAXING: NEARLY EVERY DAY
2. NOT BEING ABLE TO STOP OR CONTROL WORRYING: NEARLY EVERY DAY
3. WORRYING TOO MUCH ABOUT DIFFERENT THINGS: MORE THAN HALF THE DAYS
7. FEELING AFRAID AS IF SOMETHING AWFUL MIGHT HAPPEN: SEVERAL DAYS
5. BEING SO RESTLESS THAT IT IS HARD TO SIT STILL: MORE THAN HALF THE DAYS
1. FEELING NERVOUS, ANXIOUS, OR ON EDGE: MORE THAN HALF THE DAYS
6. BECOMING EASILY ANNOYED OR IRRITABLE: MORE THAN HALF THE DAYS

## 2022-04-11 ASSESSMENT — PATIENT HEALTH QUESTIONNAIRE - PHQ9: SUM OF ALL RESPONSES TO PHQ QUESTIONS 1-9: 13

## 2022-04-12 NOTE — PROGRESS NOTES
3/21/22    HISTORY OF PRESENT ILLNESS:  Abelino Gracia is a 37 year-old male who has had elevated creatinine levels dating back to 2006 according to [facility] records.       History taken from previous notes: His creatinine has ranged anywhere from 1.3-1.7 in his [facility] records.  He was seen by nephrology on one occasion while inpatient at [facility] in 2009 and his kidney injury at that time was felt to be related to long-standing heavy use of NSAIDs.  To briefly summarize risk factors for CKD: heavy NSAID use in the past, previous etoh abuse (sober since 09). Addt hx includes DESHAUN. His serologic workup including C3, C4, ANCA, Anti-GBM, and immunofixations were normal. He is felt to have QURESHI as the cause of his liver abnormalities.               His previous baseline kidney function had been 1.8-2 but has been running slightly higher at 1.8-2.3 in the past year.  His creatinine is 2.08 at this time.  He has been on Topamax for weight loss however without weight loss success.  His phosphorus was noted to be 1.6 at this time and he does report some muscle related weakness.  He is taking 1000 units of vitamin D daily.     03/23/20: no home BP readings at this time (no cuff). Clinic Readings have been less than 130/80. Weight was 252 lbs down to 233 lbs in Dec. Has gained some of that back but still kept most off. No NSAIDs. No swelling concerns. No longer on topamax.      09/22/20: video visit. Since last visit, he underwent kidney biopsy on 7/15/20 which showed:  FINAL DIAGNOSIS:   Native kidney biopsy with glomerulomegaly and nonspecific chronic   interstitial changes   No success with weight loss so far - more stress. We spent time discussing his biopsy findings. Overall doing well - no specific complaints at this time. No edema.     03/22/21: video visit. GFR 36-39 for the past year. Only minimal proteinuria. On losartan 12.5 mg daily. Feeling more cold in his hands and feet. No constipation/diarrhea. No numbness  /tingling. BP less than 130 when he checks it but on/off with checking it at home. He has received first dose of the vaccine. No swelling. Working from home.     3/21/22: in person visit. Creatinine had been 2.1-2.2 but slightly improved most recently at 1.95 now as well as at his Nov lab. UPCR was 0.21 g/g on last check. He had bilateral carpal tunnel repair since last visit (nov and dec).  but was 97 on one occasion in Dec. No edema concerns.        acetaminophen (TYLENOL) 325 MG tablet, Take 325 mg by mouth as needed   albuterol (PROAIR HFA) 108 (90 Base) MCG/ACT inhaler, Inhale 2 puffs into the lungs every 4 hours as needed  Bioflavonoid Products (VITAMIN C) CHEW, Take 500 mg by mouth daily  blood glucose monitoring (SARA CONTOUR MONITOR) meter device kit, Use to test blood sugars one time daily. Strips Ex: 7/31/2019, Lot: [id number], SN: [id number]  blood glucose monitoring (SARA CONTOUR) test strip, Use to test blood sugars one time daily  blood glucose monitoring (SARA MICROLET) lancets, Use to test blood sugar 1 times daily  buPROPion (WELLBUTRIN XL) 300 MG 24 hr tablet, TAKE 1 TABLET BY MOUTH EVERY DAY  Calcium Carbonate Antacid (TUMS PO), Take by mouth as needed  cetirizine (ZYRTEC) 10 MG tablet, Take 10 mg by mouth daily as needed   diphenhydrAMINE (BENADRYL) 25 MG tablet, Take 50 mg by mouth nightly as needed for itching or allergies  Fexofenadine HCl (ALLEGRA PO), Take 60 mg by mouth daily as needed   fluticasone (FLONASE) 50 MCG/ACT nasal spray, Spray 1-2 sprays into both nostrils daily as needed   gabapentin (NEURONTIN) 600 MG tablet, Take 1 tablet (600 mg) by mouth 3 times daily (Patient taking differently: 300 mg daily )  losartan (COZAAR) 25 MG tablet, Take 0.5 tablets (12.5 mg) by mouth daily To fill when current expires  melatonin 3 MG tablet, Take 6 mg by mouth nightly as needed for sleep   Multiple Vitamin (MULTIVITAMIN ADULT PO), Take 1 tablet by mouth daily  propranolol ER (INDERAL  "LA) 60 MG 24 hr capsule, TAKE 1 CAPSULE BY MOUTH EVERY DAY  VICTOZA PEN 18 MG/3ML soln, ADMINISTER 0.6 MG UNDER THE SKIN DAILY    No current facility-administered medications on file prior to visit.      Exam:  /87 (BP Location: Left arm, Patient Position: Sitting)   Pulse 108   Ht 1.778 m (5' 10\")   Wt 113.7 kg (250 lb 11.2 oz)   SpO2 98%   BMI 35.97 kg/m    GENERAL APPEARANCE: alert and no distress    Results:   Lab on 03/21/2022   Component Date Value Ref Range Status     Sodium 03/21/2022 139  133 - 144 mmol/L Final     Potassium 03/21/2022 4.1  3.4 - 5.3 mmol/L Final     Chloride 03/21/2022 109  94 - 109 mmol/L Final     Carbon Dioxide (CO2) 03/21/2022 23  20 - 32 mmol/L Final     Anion Gap 03/21/2022 7  3 - 14 mmol/L Final     Urea Nitrogen 03/21/2022 31 (A) 7 - 30 mg/dL Final     Creatinine 03/21/2022 1.98 (A) 0.66 - 1.25 mg/dL Final     Calcium 03/21/2022 9.7  8.5 - 10.1 mg/dL Final     Glucose 03/21/2022 184 (A) 70 - 99 mg/dL Final     Albumin 03/21/2022 3.6  3.4 - 5.0 g/dL Final     Phosphorus 03/21/2022 2.2 (A) 2.5 - 4.5 mg/dL Final     GFR Estimate 03/21/2022 43 (A) >60 mL/min/1.73m2 Final    Effective December 21, 2021 eGFRcr in adults is calculated using the 2021 CKD-EPI creatinine equation which includes age and gender (Ronna et al., NEJM, DOI: 10.1056/ANEPzs2187846)     Total Protein Random Urine g/L 03/21/2022 0.26  g/L Final    The reference range has not been established for total protein in random urine samples.  The result should be integrated into the clinical context for interpretation.     Total Protein Urine g/gr Creatinine 03/21/2022 0.25 (A) 0.00 - 0.20 g/g Cr Final     Creatinine Urine mg/dL 03/21/2022 103  mg/dL Final     Parathyroid Hormone Intact 03/21/2022 36  18 - 80 pg/mL Final     Hemoglobin 03/21/2022 16.5  13.3 - 17.7 g/dL Final     Lab results were reviewed and interpreted.       Assessment/Plan:   1.  CKD Stage 3:  Biopsy from July 2020 is consistent with nonspecific " interstitial changes and glomerulomegaly. His biggest risk factor for kidney disease is related to long-standing NSAID use. He is now away from NSAIDs. Educated on the benefits of weight loss and blood pressure control. Will monitor routinely for now. On losartan 12.5 mg daily for protein lowering effect.      2.  QURESHI: Encouraged ongoing weight loss not only to help his kidney function/proteinuria but also avoid addt liver damage related to fatty liver disease.  Also encouraged avoidance of etoh. He has been seen by hepatology.      3. Prediabetes - followed by PCP - last A1C 5.9% in October.      4. Obesity: has had success with weight loss - weight is 250 today (was 258 in December).      Patient Instructions   1. Labs in 6 months  2. Follow-up in one year       Amina Mena,

## 2022-04-14 ASSESSMENT — PATIENT HEALTH QUESTIONNAIRE - PHQ9
10. IF YOU CHECKED OFF ANY PROBLEMS, HOW DIFFICULT HAVE THESE PROBLEMS MADE IT FOR YOU TO DO YOUR WORK, TAKE CARE OF THINGS AT HOME, OR GET ALONG WITH OTHER PEOPLE: SOMEWHAT DIFFICULT
SUM OF ALL RESPONSES TO PHQ QUESTIONS 1-9: 13

## 2022-04-14 ASSESSMENT — ANXIETY QUESTIONNAIRES
7. FEELING AFRAID AS IF SOMETHING AWFUL MIGHT HAPPEN: SEVERAL DAYS
GAD7 TOTAL SCORE: 15
GAD7 TOTAL SCORE: 15

## 2022-04-15 ASSESSMENT — ANXIETY QUESTIONNAIRES: GAD7 TOTAL SCORE: 15

## 2022-04-15 ASSESSMENT — PATIENT HEALTH QUESTIONNAIRE - PHQ9: SUM OF ALL RESPONSES TO PHQ QUESTIONS 1-9: 13

## 2022-04-28 ENCOUNTER — OFFICE VISIT (OUTPATIENT)
Dept: FAMILY MEDICINE | Facility: CLINIC | Age: 37
End: 2022-04-28
Payer: COMMERCIAL

## 2022-04-28 VITALS
HEART RATE: 108 BPM | WEIGHT: 255 LBS | SYSTOLIC BLOOD PRESSURE: 137 MMHG | DIASTOLIC BLOOD PRESSURE: 88 MMHG | OXYGEN SATURATION: 96 % | BODY MASS INDEX: 36.59 KG/M2

## 2022-04-28 DIAGNOSIS — E66.01 MORBID OBESITY (H): ICD-10-CM

## 2022-04-28 DIAGNOSIS — G63 POLYNEUROPATHY ASSOCIATED WITH UNDERLYING DISEASE (H): ICD-10-CM

## 2022-04-28 DIAGNOSIS — F32.5 MAJOR DEPRESSIVE DISORDER IN FULL REMISSION, UNSPECIFIED WHETHER RECURRENT (H): ICD-10-CM

## 2022-04-28 DIAGNOSIS — F10.21 ALCOHOL DEPENDENCE IN REMISSION (H): ICD-10-CM

## 2022-04-28 DIAGNOSIS — F51.01 PRIMARY INSOMNIA: Primary | ICD-10-CM

## 2022-04-28 DIAGNOSIS — E11.9 TYPE 2 DIABETES, HBA1C GOAL < 8% (H): ICD-10-CM

## 2022-04-28 LAB — HBA1C MFR BLD: 6.2 % (ref 0–5.6)

## 2022-04-28 PROCEDURE — 83036 HEMOGLOBIN GLYCOSYLATED A1C: CPT | Performed by: INTERNAL MEDICINE

## 2022-04-28 PROCEDURE — 99214 OFFICE O/P EST MOD 30 MIN: CPT | Performed by: INTERNAL MEDICINE

## 2022-04-28 PROCEDURE — 36415 COLL VENOUS BLD VENIPUNCTURE: CPT | Performed by: INTERNAL MEDICINE

## 2022-04-28 RX ORDER — TRAZODONE HYDROCHLORIDE 50 MG/1
50-100 TABLET, FILM COATED ORAL AT BEDTIME
Qty: 62 TABLET | Refills: 4 | Status: SHIPPED | OUTPATIENT
Start: 2022-04-28 | End: 2022-09-22

## 2022-04-28 ASSESSMENT — PATIENT HEALTH QUESTIONNAIRE - PHQ9
SUM OF ALL RESPONSES TO PHQ QUESTIONS 1-9: 13
10. IF YOU CHECKED OFF ANY PROBLEMS, HOW DIFFICULT HAVE THESE PROBLEMS MADE IT FOR YOU TO DO YOUR WORK, TAKE CARE OF THINGS AT HOME, OR GET ALONG WITH OTHER PEOPLE: SOMEWHAT DIFFICULT

## 2022-04-28 ASSESSMENT — ANXIETY QUESTIONNAIRES: GAD7 TOTAL SCORE: 15

## 2022-04-28 NOTE — PROGRESS NOTES
"  Assessment & Plan   Problem List Items Addressed This Visit     Insomnia - Primary    Relevant Medications    traZODone (DESYREL) 50 MG tablet    Morbid obesity (H)      Other Visit Diagnoses     Type 2 diabetes, HbA1C goal < 8% (H)        Relevant Orders    OPTOMETRY REFERRAL    HEMOGLOBIN A1C (Completed)       BP     137/88  4/29/2022    Lab Results   Component Value Date     03/21/2022    GLC 98 03/04/2021     Lab Results   Component Value Date    A1C 6.2 04/28/2022    A1C 6.0 03/04/2021     Lab Results   Component Value Date     09/17/2021    LDL 96 08/28/2020     Lab Results   Component Value Date    MICROL 12 02/25/2019     No results found for: MICROALBUMIN             BMI:   Estimated body mass index is 36.59 kg/m  as calculated from the following:    Height as of 3/21/22: 1.778 m (5' 10\").    Weight as of this encounter: 115.7 kg (255 lb).   Weight management plan: Discussed healthy diet and exercise guidelines    Work on weight loss  Regular exercise    No follow-ups on file.    Roberth Tavares MD  [facility] PETTY Rascon is a 37 year old who presents for the following health issues     History of Present Illness       Mental Health Follow-up:  Patient presents to follow-up on Depression & Anxiety.Patient's depression since last visit has been:  Worse  The patient is having other symptoms associated with depression.  Patient's anxiety since last visit has been:  Worse  The patient is having other symptoms associated with anxiety.  Any significant life events: job concerns  Patient is feeling anxious or having panic attacks.  Patient has no concerns about alcohol or drug use.       Today's PHQ-9         PHQ-9 Total Score: 13  PHQ-9 Q9 Thoughts of better off dead/self-harm past 2 weeks :   (P) Not at all    How difficult have these problems made it for you to do your work, take care of things at home, or get along with other people: Somewhat " difficult    Today's HOUSTON-7 Score: 15    Reason for visit:  Increased anxiety, stress. Trying to dc gabapentin  Symptom onset:  More than a month  Symptoms include:  Really irritable, stressed, on edge. Worn out, very pessimistic  Symptom intensity:  Moderate  Symptom progression:  Worsening  Had these symptoms before:  Yes  Has tried/received treatment for these symptoms:  Yes  Previous treatment was successful:  No  What makes it worse:  Going to work  What makes it better:  Bubble bath, reading    He eats 2-3 servings of fruits and vegetables daily.He consumes 0 sweetened beverage(s) daily.He exercises with enough effort to increase his heart rate 9 or less minutes per day.  He exercises with enough effort to increase his heart rate 3 or less days per week.   He is taking medications regularly.     Gabapentin 1/4 tablet - can't   Neuropathy improved carpal tunnel   Gabapentin titration   Sleep and irritable.  Trazodone         Review of Systems   Constitutional, HEENT, cardiovascular, pulmonary, gi and gu systems are negative, except as otherwise noted.      Objective    /88 (BP Location: Right arm, Patient Position: Chair, Cuff Size: Adult Large)   Pulse 108   Wt 115.7 kg (255 lb)   SpO2 96%   BMI 36.59 kg/m    Body mass index is 36.59 kg/m .  Physical Exam   GENERAL: healthy, alert and no distress  EYES: Eyes grossly normal to inspection, PERRL and conjunctivae and sclerae normal  HENT: ear canals and TM's normal, nose and mouth without ulcers or lesions  NECK: no adenopathy, no asymmetry, masses, or scars and thyroid normal to palpation  RESP: lungs clear to auscultation - no rales, rhonchi or wheezes  CV: regular rate and rhythm, normal S1 S2, no S3 or S4, no murmur, click or rub, no peripheral edema and peripheral pulses strong  ABDOMEN: soft, nontender, no hepatosplenomegaly, no masses and bowel sounds normal  MS: no gross musculoskeletal defects noted, no edema  SKIN: no suspicious lesions or  rashes  NEURO: Normal strength and tone, mentation intact and speech normal  BACK: no CVA tenderness, no paralumbar tenderness  PSYCH: mentation appears normal, affect normal/bright  LYMPH: no cervical, supraclavicular, axillary, or inguinal adenopathy    Results for orders placed or performed in visit on 04/28/22   HEMOGLOBIN A1C     Status: Abnormal   Result Value Ref Range    Hemoglobin A1C 6.2 (H) 0.0 - 5.6 %

## 2022-04-29 PROBLEM — F32.5 MAJOR DEPRESSIVE DISORDER IN FULL REMISSION, UNSPECIFIED WHETHER RECURRENT (H): Status: ACTIVE | Noted: 2022-04-29

## 2022-04-29 PROBLEM — G63 POLYNEUROPATHY ASSOCIATED WITH UNDERLYING DISEASE (H): Status: ACTIVE | Noted: 2022-04-29

## 2022-04-30 DIAGNOSIS — G43.109 MIGRAINE WITH AURA AND WITHOUT STATUS MIGRAINOSUS, NOT INTRACTABLE: ICD-10-CM

## 2022-05-03 RX ORDER — PROPRANOLOL HCL 60 MG
CAPSULE, EXTENDED RELEASE 24HR ORAL
Qty: 90 CAPSULE | Refills: 2 | Status: SHIPPED | OUTPATIENT
Start: 2022-05-03 | End: 2022-09-22

## 2022-05-03 NOTE — TELEPHONE ENCOUNTER
Prescription approved per [facility] Refill Protocol.    Crystal Lizarraga RN

## 2022-06-07 DIAGNOSIS — E11.9 TYPE 2 DIABETES, HBA1C GOAL < 8% (H): ICD-10-CM

## 2022-06-09 RX ORDER — LIRAGLUTIDE 6 MG/ML
INJECTION SUBCUTANEOUS
Qty: 9 ML | Refills: 0 | Status: SHIPPED | OUTPATIENT
Start: 2022-06-09 | End: 2022-09-21

## 2022-06-23 ENCOUNTER — MYC MEDICAL ADVICE (OUTPATIENT)
Dept: FAMILY MEDICINE | Facility: CLINIC | Age: 37
End: 2022-06-23

## 2022-06-23 DIAGNOSIS — E11.9 TYPE 2 DIABETES, HBA1C GOAL < 8% (H): Primary | ICD-10-CM

## 2022-06-23 NOTE — TELEPHONE ENCOUNTER
Dr. Tavares,     Please see [employer] message. Unsure which needles to cue for victoza.     Thanks,  FRANSISCA Everett  [facility]

## 2022-07-28 ENCOUNTER — VIRTUAL VISIT (OUTPATIENT)
Dept: FAMILY MEDICINE | Facility: CLINIC | Age: 37
End: 2022-07-28
Payer: COMMERCIAL

## 2022-07-28 DIAGNOSIS — U07.1 INFECTION DUE TO 2019 NOVEL CORONAVIRUS: Primary | ICD-10-CM

## 2022-07-28 PROCEDURE — 99213 OFFICE O/P EST LOW 20 MIN: CPT | Mod: 95 | Performed by: PHYSICIAN ASSISTANT

## 2022-07-28 NOTE — PATIENT INSTRUCTIONS
"Medication Changes:   Hold Trazodone and Flonase while taking Paxlovid.   Monitor blood sugars and mood more closely.     Instructions for Patients  Your COVID-19 test was positive. This means you have the virus. Please follow the \"How can I take care of myself\" and \"How do I self-isolate?\" guidelines in these instructions.    What treatments are available?  Over-the-counter medicines may help with your symptoms such as runny or stuffy nose, cough, chills, and fever. Talk to your care team about your options.     Some people are at high risk for severe illness (for example if you have a weak immune system, you're 65 or older, or you have certain medical problems). If your risk it high and your symptoms started in the last 5 to 7 days, we strongly recommend for you to get COVID treatment as soon as possible before you get really sick. Paxlovid, Molnupiravir and the monoclonal antibody treatments are proven safe and effective, make you feel better faster, and prevent hospitalization and death.       You can schedule an appointment to discuss COVID treatment by requesting an appointment on [facility] by selecting \"schedule COVID-19 Treatment\" or by calling [employer] ([phone]) and pressing 7.    What are the symptoms of COVID-19?  Symptoms can include fever, cough, shortness of breath, chills, headache, muscle pain sore throat, fatigue, runny or stuffy nose, and loss of taste and smell. Other less common symptoms include nausea, vomiting, or diarrhea (watery stools).    Know when to call 911. Emergency warning signs include:  Trouble breathing or shortness of breath  Pain or pressure in the chest that doesn't go away  Feeling confused like you haven't felt before, or not being able to wake up  Bluish-colored lips or face    How can I take care of myself?  Get lots of rest. Drink extra fluids (unless a doctor has told you not to).  Take Tylenol (acetaminophen) for fever or pain. If you have liver or kidney " problems, ask your family doctor if it's okay to take Tylenol   Adults:   650 mg (two 325 mg pills or tablets) every 4 to 6 hours, or...   1,000 mg (two 500 mg pills or tablets) every 8 hours as needed.  Note: Don't take more than 3,000 mg in one day. Acetaminophen is found in many medicines (both prescribed and over the counter). Read all labels to be sure you don't take too much.  For children, check the Tylenol bottle for the right dose. The dose is based on the child's age or weight.  Take over the counter medicines for your symptoms as needed. Talk to your pharmacist.  If you have other health problems (like cancer, heart failure, an organ transplant, or severe kidney disease): Call your specialty clinic if you don't feel better in the next 2 days.    These guidelines are for isolating and quarantining before returning to work, school or .   For employers, schools and day cares: This is an official notice for this person s medical guidelines for returning in-person.   For health care sites: The CDC gives different isolation and quarantine guidelines for healthcare sites, please check with these sites before arriving.     How do I self-isolate?  You isolate when you have symptoms of COVID or a test shows you have COVID, even if you don t have symptoms.   If you DO have symptoms:  Stay home and away from others  For at least 5 days after your symptoms started, AND   You are fever free for 24 hours (with no medicine that reduces fever), AND  Your other symptoms are better.  Wear a mask for 10 full days any time you are around others.  If you DON T have symptoms:  Stay at home and away from others for at least 5 days after your positive test.  Wear a mask for 10 full days any time you are around others.    How and when do I quarantine?  You quarantine when you may have been exposed to the virus and DON T have symptoms.   Stay home and away from others.   You must quarantine for 5 days after your last contact  with a person who has COVID.  Note: If you are fully vaccinated, you don t need to quarantine. You should still follow the steps below.   Wear a mask for 10 full days any time you re around others.  Get tested at least 5 days after you were exposed, even if you don t have symptoms.   If you start to have symptoms, isolate right away and get tested.    Where can I get more information?  [facility] COVID-19 Resource Hub: [email or web address]   [facility] Quarantine & Isolation: https://www.cdc.gov/coronavirus/2019-ncov/your-health/quarantine-isolation.html   CDC - What to Do If You're Sick: https://www.cdc.gov/coronavirus/2019-ncov/if-you-are-sick/index.html  [facility] clinical trials (COVID-19 research studies): [email or web address].[facility]/umn-clinical-trials  [place] Department of Health COVID-19 Public Hotline: 1-947.302.8432

## 2022-07-28 NOTE — PROGRESS NOTES
Abiodun is a 37 year old who is being evaluated via a billable video visit.      How would you like to obtain your AVS? MyChart  If the video visit is dropped, the invitation should be resent by: Text to cell phone: [phone]  Will anyone else be joining your video visit? No  Assessment & Plan   Infection due to 2019 novel coronavirus  Covid home test positive this morning. Symptoms developed 1 day ago. Meets medical criteria for Paxlovid. DDI discussed, see patient instructions. Rx for Paxlovid for reduce dose due to kidney disease. Continue all other OTC medications for cold and cough symptoms. Warning signs and symptoms discussed on when to return to clinic or go to the ER. Pt verbalized understanding.    - nirmatrelvir and ritonavir (PAXLOVID) therapy pack; Take 2 tablets by mouth 2 times daily for 5 days Take 1 tablet of Nirmatelvir and 1 tablet of Ritonavir twice daily for 5 days    Return in about 1 week (around 8/4/2022), or if symptoms worsen or fail to improve, for Video Visit.    Kevin Real PA-C  [facility]   Abiodun is a 37 year old, presenting for the following health issues:  Covid treatment     HPI   COVID-19 Symptom Review  How many days ago did these symptoms start? 1 days   Are any of the following symptoms significant for you?  New or worsening difficulty breathing? Yes    Please describe what kind of difficulty you are having breathing:Mild dyspnea (able to do ADLs without difficulty, mild shortness of breath with activities such as climbing one or two flights of stairs or walking briskly)    Worsening cough? Yes, it's a dry cough.     Fever or chills? Yes, I felt feverish or had chills.    Headache: YES    Sore throat: YES    Chest pain: No    Diarrhea: YES    Body aches? YES  What treatments has patient tried? Dayquil/Nyquil and Tylenol    Does patient live in a nursing home, group home, or shelter? No  Does patient have a way to get  food/medications during quarantined? Yes, I have a friend or family member who can help me.    Review of Systems   See HPI         Objective       Vitals:  No vitals were obtained today due to virtual visit.    Physical Exam   GENERAL: Healthy, alert and no distress  EYES: Eyes grossly normal to inspection.  No discharge or erythema, or obvious scleral/conjunctival abnormalities.  RESP: No audible wheeze, or visible cyanosis. Occasional cough. No visible retractions or increased work of breathing.    SKIN: Visible skin clear. No significant rash, abnormal pigmentation or lesions.  NEURO: Cranial nerves grossly intact.  Mentation and speech appropriate for age.  PSYCH: Mentation appears normal, affect normal/bright, judgement and insight intact, normal speech and appearance well-groomed.      Video-Visit Details    Video Start Time: 7:58 AM    Type of service:  Video Visit    Video End Time:8:06 AM    Originating Location (pt. Location): Home    Distant Location (provider location):  [facility]     Platform used for Video Visit: Daily Aisle    Tmay Morelos.

## 2022-07-29 DIAGNOSIS — F41.9 MODERATE ANXIETY: ICD-10-CM

## 2022-07-31 RX ORDER — BUPROPION HYDROCHLORIDE 300 MG/1
300 TABLET ORAL DAILY
Qty: 90 TABLET | Refills: 0 | Status: SHIPPED | OUTPATIENT
Start: 2022-07-31 | End: 2022-09-22

## 2022-07-31 NOTE — TELEPHONE ENCOUNTER
Medication is being filled for 1 time refill only due to:  Patient needs to be seen because needs appt-.     Return in about 6 months (around 10/28/2022) for follow up of condition, medication management.

## 2022-08-01 ENCOUNTER — NURSE TRIAGE (OUTPATIENT)
Dept: INTERNAL MEDICINE | Facility: CLINIC | Age: 37
End: 2022-08-01

## 2022-08-01 NOTE — TELEPHONE ENCOUNTER
Pt calling. States he is really confused and head is numb. Has been taking Paxlovid and today is the last day. Pt wondering if this could be a side effect of medication. Had this feeling yesterday and is trying to work today and is evident that there is something wrong. Head is numb and can't think clearly. Is dizzy and shaky. No SOB. No chest pain. No loss of speech. No slurred speech. No one sided weakness. No headache. Couldn't focus enough to schedule an appointment through [facility].     Reason for Disposition    Patient sounds very sick or weak to the triager    Additional Information    Negative: Difficult to awaken or acting confused (e.g., disoriented, slurred speech)    Negative: New neurologic deficit that is present NOW, sudden onset of ANY of the following: * Weakness of the face, arm, or leg on one side of the body* Numbness of the face, arm, or leg on one side of the body* Loss of speech or garbled speech    Negative: Sounds like a life-threatening emergency to the triager    Negative: Confusion, disorientation, or hallucinations is the main symptom    Negative: Dizziness is the main symptom    Negative: Followed a head injury within last 3 days    Negative: Headache (with neurologic deficit)    Negative: Unable to urinate (or only a few drops) and bladder feels very full    Negative: Loss of control of bowel or bladder (i.e., incontinence) of new onset    Negative: Back pain with numbness (loss of sensation) in groin or rectal area    Protocols used: NEUROLOGIC DEFICIT-A-[place]

## 2022-08-27 ENCOUNTER — HEALTH MAINTENANCE LETTER (OUTPATIENT)
Age: 37
End: 2022-08-27

## 2022-09-19 ENCOUNTER — LAB (OUTPATIENT)
Dept: LAB | Facility: CLINIC | Age: 37
End: 2022-09-19
Payer: COMMERCIAL

## 2022-09-19 DIAGNOSIS — E55.9 VITAMIN D DEFICIENCY: ICD-10-CM

## 2022-09-19 DIAGNOSIS — N05.1 FSGS (FOCAL SEGMENTAL GLOMERULOSCLEROSIS): ICD-10-CM

## 2022-09-19 DIAGNOSIS — E11.9 TYPE 2 DIABETES, HBA1C GOAL < 8% (H): ICD-10-CM

## 2022-09-19 LAB
ALBUMIN MFR UR ELPH: 8 MG/DL
ALBUMIN SERPL-MCNC: 3.9 G/DL (ref 3.4–5)
ANION GAP SERPL CALCULATED.3IONS-SCNC: 9 MMOL/L (ref 3–14)
BUN SERPL-MCNC: 32 MG/DL (ref 7–30)
CALCIUM SERPL-MCNC: 9.2 MG/DL (ref 8.5–10.1)
CHLORIDE BLD-SCNC: 106 MMOL/L (ref 94–109)
CO2 SERPL-SCNC: 22 MMOL/L (ref 20–32)
CREAT SERPL-MCNC: 2.11 MG/DL (ref 0.66–1.25)
CREAT UR-MCNC: 45.3 MG/DL
GFR SERPL CREATININE-BSD FRML MDRD: 40 ML/MIN/1.73M2
GLUCOSE BLD-MCNC: 129 MG/DL (ref 70–99)
HGB BLD-MCNC: 16 G/DL (ref 13.3–17.7)
PHOSPHATE SERPL-MCNC: 1.3 MG/DL (ref 2.5–4.5)
POTASSIUM BLD-SCNC: 4.4 MMOL/L (ref 3.4–5.3)
PROT/CREAT 24H UR: 0.18 MG/MG CR (ref 0–0.2)
PTH-INTACT SERPL-MCNC: 104 PG/ML (ref 15–65)
SODIUM SERPL-SCNC: 137 MMOL/L (ref 133–144)

## 2022-09-19 PROCEDURE — 80069 RENAL FUNCTION PANEL: CPT

## 2022-09-19 PROCEDURE — 84156 ASSAY OF PROTEIN URINE: CPT

## 2022-09-19 PROCEDURE — 85018 HEMOGLOBIN: CPT

## 2022-09-19 PROCEDURE — 83970 ASSAY OF PARATHORMONE: CPT

## 2022-09-19 PROCEDURE — 82306 VITAMIN D 25 HYDROXY: CPT

## 2022-09-19 PROCEDURE — 83036 HEMOGLOBIN GLYCOSYLATED A1C: CPT

## 2022-09-19 PROCEDURE — 36415 COLL VENOUS BLD VENIPUNCTURE: CPT

## 2022-09-21 RX ORDER — LIRAGLUTIDE 6 MG/ML
INJECTION SUBCUTANEOUS
Qty: 9 ML | Refills: 0 | Status: SHIPPED | OUTPATIENT
Start: 2022-09-21 | End: 2022-09-22

## 2022-09-22 ENCOUNTER — TELEPHONE (OUTPATIENT)
Dept: FAMILY MEDICINE | Facility: CLINIC | Age: 37
End: 2022-09-22

## 2022-09-22 ENCOUNTER — OFFICE VISIT (OUTPATIENT)
Dept: FAMILY MEDICINE | Facility: CLINIC | Age: 37
End: 2022-09-22
Payer: COMMERCIAL

## 2022-09-22 VITALS
WEIGHT: 228 LBS | OXYGEN SATURATION: 97 % | SYSTOLIC BLOOD PRESSURE: 110 MMHG | BODY MASS INDEX: 32.71 KG/M2 | HEART RATE: 107 BPM | TEMPERATURE: 97.7 F | DIASTOLIC BLOOD PRESSURE: 82 MMHG

## 2022-09-22 DIAGNOSIS — N05.1 FSGS (FOCAL SEGMENTAL GLOMERULOSCLEROSIS): ICD-10-CM

## 2022-09-22 DIAGNOSIS — E55.9 VITAMIN D DEFICIENCY: ICD-10-CM

## 2022-09-22 DIAGNOSIS — F41.9 MODERATE ANXIETY: ICD-10-CM

## 2022-09-22 DIAGNOSIS — Z13.220 SCREENING FOR HYPERLIPIDEMIA: Primary | ICD-10-CM

## 2022-09-22 DIAGNOSIS — G43.109 MIGRAINE WITH AURA AND WITHOUT STATUS MIGRAINOSUS, NOT INTRACTABLE: ICD-10-CM

## 2022-09-22 DIAGNOSIS — F51.01 PRIMARY INSOMNIA: ICD-10-CM

## 2022-09-22 DIAGNOSIS — E83.30 DISORDER OF PHOSPHORUS METABOLISM: ICD-10-CM

## 2022-09-22 DIAGNOSIS — E11.9 TYPE 2 DIABETES, HBA1C GOAL < 8% (H): ICD-10-CM

## 2022-09-22 LAB
DEPRECATED CALCIDIOL+CALCIFEROL SERPL-MC: 28 UG/L (ref 20–75)
HBA1C MFR BLD: 5.9 % (ref 0–5.6)

## 2022-09-22 PROCEDURE — 99214 OFFICE O/P EST MOD 30 MIN: CPT | Mod: 25 | Performed by: INTERNAL MEDICINE

## 2022-09-22 PROCEDURE — 0124A COVID-19,PF,PFIZER BOOSTER BIVALENT: CPT | Performed by: INTERNAL MEDICINE

## 2022-09-22 PROCEDURE — 90686 IIV4 VACC NO PRSV 0.5 ML IM: CPT | Performed by: INTERNAL MEDICINE

## 2022-09-22 PROCEDURE — 90471 IMMUNIZATION ADMIN: CPT | Performed by: INTERNAL MEDICINE

## 2022-09-22 PROCEDURE — 90715 TDAP VACCINE 7 YRS/> IM: CPT | Performed by: INTERNAL MEDICINE

## 2022-09-22 PROCEDURE — 91312 COVID-19,PF,PFIZER BOOSTER BIVALENT: CPT | Performed by: INTERNAL MEDICINE

## 2022-09-22 PROCEDURE — 96127 BRIEF EMOTIONAL/BEHAV ASSMT: CPT | Performed by: INTERNAL MEDICINE

## 2022-09-22 PROCEDURE — 90472 IMMUNIZATION ADMIN EACH ADD: CPT | Performed by: INTERNAL MEDICINE

## 2022-09-22 RX ORDER — BUPROPION HYDROCHLORIDE 300 MG/1
300 TABLET ORAL DAILY
Qty: 90 TABLET | Refills: 4 | Status: SHIPPED | OUTPATIENT
Start: 2022-09-22 | End: 2023-10-23

## 2022-09-22 RX ORDER — TRAZODONE HYDROCHLORIDE 50 MG/1
50-100 TABLET, FILM COATED ORAL AT BEDTIME
Qty: 180 TABLET | Refills: 4 | Status: SHIPPED | OUTPATIENT
Start: 2022-09-22

## 2022-09-22 RX ORDER — PROPRANOLOL HCL 60 MG
60 CAPSULE, EXTENDED RELEASE 24HR ORAL DAILY
Qty: 90 CAPSULE | Refills: 4 | Status: SHIPPED | OUTPATIENT
Start: 2022-09-22 | End: 2023-03-20

## 2022-09-22 RX ORDER — LOSARTAN POTASSIUM 25 MG/1
12.5 TABLET ORAL DAILY
Qty: 45 TABLET | Refills: 3 | Status: SHIPPED | OUTPATIENT
Start: 2022-09-22 | End: 2023-09-01

## 2022-09-22 RX ORDER — LIRAGLUTIDE 6 MG/ML
INJECTION SUBCUTANEOUS
Qty: 9 ML | Refills: 4 | Status: SHIPPED | OUTPATIENT
Start: 2022-09-22 | End: 2023-09-01

## 2022-09-22 ASSESSMENT — PATIENT HEALTH QUESTIONNAIRE - PHQ9
SUM OF ALL RESPONSES TO PHQ QUESTIONS 1-9: 6
SUM OF ALL RESPONSES TO PHQ QUESTIONS 1-9: 6
10. IF YOU CHECKED OFF ANY PROBLEMS, HOW DIFFICULT HAVE THESE PROBLEMS MADE IT FOR YOU TO DO YOUR WORK, TAKE CARE OF THINGS AT HOME, OR GET ALONG WITH OTHER PEOPLE: SOMEWHAT DIFFICULT

## 2022-09-22 ASSESSMENT — ASTHMA QUESTIONNAIRES
QUESTION_2 LAST FOUR WEEKS HOW OFTEN HAVE YOU HAD SHORTNESS OF BREATH: NOT AT ALL
ACT_TOTALSCORE: 25
QUESTION_4 LAST FOUR WEEKS HOW OFTEN HAVE YOU USED YOUR RESCUE INHALER OR NEBULIZER MEDICATION (SUCH AS ALBUTEROL): NOT AT ALL
QUESTION_3 LAST FOUR WEEKS HOW OFTEN DID YOUR ASTHMA SYMPTOMS (WHEEZING, COUGHING, SHORTNESS OF BREATH, CHEST TIGHTNESS OR PAIN) WAKE YOU UP AT NIGHT OR EARLIER THAN USUAL IN THE MORNING: NOT AT ALL
ACT_TOTALSCORE: 25
QUESTION_5 LAST FOUR WEEKS HOW WOULD YOU RATE YOUR ASTHMA CONTROL: COMPLETELY CONTROLLED
QUESTION_1 LAST FOUR WEEKS HOW MUCH OF THE TIME DID YOUR ASTHMA KEEP YOU FROM GETTING AS MUCH DONE AT WORK, SCHOOL OR AT HOME: NONE OF THE TIME

## 2022-09-22 ASSESSMENT — ANXIETY QUESTIONNAIRES
3. WORRYING TOO MUCH ABOUT DIFFERENT THINGS: SEVERAL DAYS
2. NOT BEING ABLE TO STOP OR CONTROL WORRYING: SEVERAL DAYS
8. IF YOU CHECKED OFF ANY PROBLEMS, HOW DIFFICULT HAVE THESE MADE IT FOR YOU TO DO YOUR WORK, TAKE CARE OF THINGS AT HOME, OR GET ALONG WITH OTHER PEOPLE?: SOMEWHAT DIFFICULT
4. TROUBLE RELAXING: MORE THAN HALF THE DAYS
IF YOU CHECKED OFF ANY PROBLEMS ON THIS QUESTIONNAIRE, HOW DIFFICULT HAVE THESE PROBLEMS MADE IT FOR YOU TO DO YOUR WORK, TAKE CARE OF THINGS AT HOME, OR GET ALONG WITH OTHER PEOPLE: SOMEWHAT DIFFICULT
6. BECOMING EASILY ANNOYED OR IRRITABLE: NEARLY EVERY DAY
1. FEELING NERVOUS, ANXIOUS, OR ON EDGE: SEVERAL DAYS
7. FEELING AFRAID AS IF SOMETHING AWFUL MIGHT HAPPEN: NOT AT ALL
5. BEING SO RESTLESS THAT IT IS HARD TO SIT STILL: SEVERAL DAYS
GAD7 TOTAL SCORE: 9
7. FEELING AFRAID AS IF SOMETHING AWFUL MIGHT HAPPEN: NOT AT ALL
GAD7 TOTAL SCORE: 9
GAD7 TOTAL SCORE: 9

## 2022-09-22 NOTE — TELEPHONE ENCOUNTER
Central Prior Authorization Team  Phone: [phone]    Prior Authorization Not Needed     Medication: liraglutide (VICTOZA PEN) 18 MG/3ML solution  Insurance Company:    Expected CoPay:      Pharmacy Filling the Rx: [id number] - [place], [place] - [address] [place] [address] AT [facility] [address]  Pharmacy Notified: Yes  Patient Notified: Yes    CALLED PHARMACY TO VERIFY A FEW THINGS AND THEY STATED THAT THE MEDICATION WAS READY FOR THE PATIENT.

## 2022-09-22 NOTE — PROGRESS NOTES
Assessment & Plan   Problem List Items Addressed This Visit     Migraine headache (Chronic)    Relevant Medications    buPROPion (WELLBUTRIN XL) 300 MG 24 hr tablet    propranolol ER (INDERAL LA) 60 MG 24 hr capsule    traZODone (DESYREL) 50 MG tablet    Insomnia    Relevant Medications    traZODone (DESYREL) 50 MG tablet      Other Visit Diagnoses     Screening for hyperlipidemia    -  Primary    Moderate anxiety        Relevant Medications    buPROPion (WELLBUTRIN XL) 300 MG 24 hr tablet    traZODone (DESYREL) 50 MG tablet    FSGS (focal segmental glomerulosclerosis)        Relevant Medications    losartan (COZAAR) 25 MG tablet    propranolol ER (INDERAL LA) 60 MG 24 hr capsule    liraglutide (VICTOZA PEN) 18 MG/3ML solution    Type 2 diabetes, HbA1C goal < 8% (H)        Relevant Medications    liraglutide (VICTOZA PEN) 18 MG/3ML solution    Other Relevant Orders    Adult Eye  Referral    HEMOGLOBIN A1C (Completed)    Vitamin D deficiency        Relevant Orders    Vitamin D Deficiency (Completed)    Disorder of phosphorus metabolism        Relevant Medications    potassium phosphate, monobasic, (K-PHOS) 500 MG tablet           Patient with low phosphorous  Elevated pth   Will add vitamin D to labs     Everything fit with renal hyperparathyroidism except for the low phosphorous    Will start low phosphorous replacement K phosph.  Trying to balance K and Phosphorous and calcium in the diet   Will likely need calcitriol     Will ask nephrology for assistance in balancing with the low phosphorous  Could this be a more complicated parathyroid issue ?  Perhaps renal hyperparathyroid and another parathyroid or renal loss of phosphorous        BP     110/82  9/25/2022    Lab Results   Component Value Date     09/19/2022    GLC 98 03/04/2021     Lab Results   Component Value Date    A1C 5.9 09/19/2022    A1C 6.0 03/04/2021     Lab Results   Component Value Date     09/17/2021    LDL 96 08/28/2020      Lab Results   Component Value Date    MICROL 12 02/25/2019     No results found for: MICROALBUMIN       Work on weight loss  Regular exercise    Return for follow up of condition, medication management.    Roberth Tavares MD  [facility] PETTY Rascon is a 37 year old, presenting for the following health issues:  Medication Follow-up      History of Present Illness       CKD: He is not using over the counter pain medicine.     Mental Health Follow-up:  Patient presents to follow-up on Depression & Anxiety.Patient's depression since last visit has been:  Better  The patient is not having other symptoms associated with depression.  Patient's anxiety since last visit has been:  Better  The patient is not having other symptoms associated with anxiety.  Any significant life events: job concerns  Patient is feeling anxious or having panic attacks.  Patient has no concerns about alcohol or drug use.    He eats 2-3 servings of fruits and vegetables daily.He consumes 1 sweetened beverage(s) daily.He exercises with enough effort to increase his heart rate 9 or less minutes per day.  He exercises with enough effort to increase his heart rate 3 or less days per week.   He is taking medications regularly.    Today's PHQ-9         PHQ-9 Total Score: 6    PHQ-9 Q9 Thoughts of better off dead/self-harm past 2 weeks :   Not at all    How difficult have these problems made it for you to do your work, take care of things at home, or get along with other people: Somewhat difficult  Today's HOUSTON-7 Score: 9     Had covid and  Confused   paxlovid -       Review of Systems   Constitutional, HEENT, cardiovascular, pulmonary, gi and gu systems are negative, except as otherwise noted.      Objective    /82 (BP Location: Right arm, Patient Position: Chair, Cuff Size: Adult Regular)   Pulse 107   Temp 97.7  F (36.5  C)   Wt 103.4 kg (228 lb)   SpO2 97%   BMI 32.71 kg/m    Body mass index is 32.71  kg/m .  Physical Exam   GENERAL: healthy, alert and no distress  EYES: Eyes grossly normal to inspection, PERRL and conjunctivae and sclerae normal  HENT: ear canals and TM's normal, nose and mouth without ulcers or lesions  NECK: no adenopathy, no asymmetry, masses, or scars and thyroid normal to palpation  RESP: lungs clear to auscultation - no rales, rhonchi or wheezes  CV: regular rate and rhythm, normal S1 S2, no S3 or S4, no murmur, click or rub, no peripheral edema and peripheral pulses strong  ABDOMEN: soft, nontender, no hepatosplenomegaly, no masses and bowel sounds normal  MS: no gross musculoskeletal defects noted, no edema  SKIN: no suspicious lesions or rashes  NEURO: Normal strength and tone, mentation intact and speech normal  BACK: no CVA tenderness, no paralumbar tenderness  PSYCH: mentation appears normal, affect normal/bright  LYMPH: no cervical, supraclavicular, axillary, or inguinal adenopathy    No results found for any visits on 09/22/22.

## 2022-10-21 ENCOUNTER — OFFICE VISIT (OUTPATIENT)
Dept: OPHTHALMOLOGY | Facility: CLINIC | Age: 37
End: 2022-10-21
Attending: INTERNAL MEDICINE
Payer: COMMERCIAL

## 2022-10-21 DIAGNOSIS — E11.9 TYPE 2 DIABETES, HBA1C GOAL < 8% (H): Primary | ICD-10-CM

## 2022-10-21 DIAGNOSIS — H52.13 MYOPIA OF BOTH EYES: ICD-10-CM

## 2022-10-21 PROCEDURE — 92015 DETERMINE REFRACTIVE STATE: CPT | Performed by: OPHTHALMOLOGY

## 2022-10-21 PROCEDURE — 92004 COMPRE OPH EXAM NEW PT 1/>: CPT | Performed by: OPHTHALMOLOGY

## 2022-10-21 ASSESSMENT — CUP TO DISC RATIO
OS_RATIO: 0.3
OD_RATIO: 0.3

## 2022-10-21 ASSESSMENT — SLIT LAMP EXAM - LIDS
COMMENTS: NORMAL
COMMENTS: NORMAL

## 2022-10-21 ASSESSMENT — TONOMETRY
OS_IOP_MMHG: 21
OD_IOP_MMHG: 19
IOP_METHOD: APPLANATION

## 2022-10-21 ASSESSMENT — VISUAL ACUITY
OD_CC: 20/20
OD_CC: J1+
CORRECTION_TYPE: GLASSES
OS_CC: 20/20
OS_CC: J1+
OD_CC+: +1
METHOD: SNELLEN - LINEAR

## 2022-10-21 ASSESSMENT — REFRACTION_MANIFEST
OS_CYLINDER: SPHERE
OS_SPHERE: -0.25
OD_AXIS: 025
OD_SPHERE: -1.00
OD_CYLINDER: +0.25

## 2022-10-21 ASSESSMENT — EXTERNAL EXAM - LEFT EYE: OS_EXAM: 1+ BROW PTOSIS

## 2022-10-21 ASSESSMENT — CONF VISUAL FIELD
METHOD: COUNTING FINGERS
OD_NORMAL: 1
OD_INFERIOR_NASAL_RESTRICTION: 0
OS_INFERIOR_NASAL_RESTRICTION: 0
OS_NORMAL: 1
OS_SUPERIOR_NASAL_RESTRICTION: 0
OS_INFERIOR_TEMPORAL_RESTRICTION: 0
OS_SUPERIOR_TEMPORAL_RESTRICTION: 0
OD_INFERIOR_TEMPORAL_RESTRICTION: 0
OD_SUPERIOR_NASAL_RESTRICTION: 0
OD_SUPERIOR_TEMPORAL_RESTRICTION: 0

## 2022-10-21 ASSESSMENT — REFRACTION_WEARINGRX
OS_CYLINDER: SPHERE
OS_SPHERE: -0.25
OD_CYLINDER: +0.25
OD_SPHERE: -1.00
OD_AXIS: 020

## 2022-10-21 ASSESSMENT — EXTERNAL EXAM - RIGHT EYE: OD_EXAM: 1+ BROW PTOSIS

## 2022-10-21 NOTE — PROGRESS NOTES
" Current Eye Medications:  none     Subjective: here for dilated exam - hx of pre-diabetes and kidney disease. Last eye exam 1 yr at [facility]. Wears distance gls as needed when not at home, comfortable without correction at near.  Told that his eye pressure is higher - no need for eye pressure drops at this time. Fhx - glaucoma mother.     PreDiabetic  Lab Results   Component Value Date    A1C 5.9 09/19/2022    A1C 6.2 04/28/2022    A1C 5.9 10/14/2021    A1C 6.1 07/15/2021    A1C 6.0 03/04/2021    A1C 6.4 08/28/2020    A1C 6.0 12/16/2019    A1C 6.4 07/30/2019    A1C 6.4 08/28/2018        Objective:  See Ophthalmology Exam.       Assessment:  Baseline dilated exam in patient with diabetes.  No diabetic retinopathy.      ICD-10-CM    1. Type 2 diabetes, HbA1C goal < 8% (H)  E11.9 Adult Eye  Referral      2. Examination of eyes and vision  Z01.00       3. Myopia of both eyes  H52.13            Plan: Glasses prescription given - optional  May use artificial tears up to four times a day (like Refresh Optive, Systane Balance, TheraTears, or generic artificial tears are ok. Avoid \"get the red out\" drops).   Call in June 2023 for an appointment in October 2023 for Complete Exam    Dr. Hutson [phone]        "

## 2022-10-21 NOTE — PATIENT INSTRUCTIONS
"Glasses prescription given - optional  May use artificial tears up to four times a day (like Refresh Optive, Systane Balance, TheraTears, or generic artificial tears are ok. Avoid \"get the red out\" drops).   Call in June 2023 for an appointment in October 2023 for Complete Exam    Dr. Hutson [phone]     Patient Education   Diabetes weakens the blood vessels all over the body, including the eyes. Damage to the blood vessels in the eyes can cause swelling or bleeding into part of the eye (called the retina). This is called diabetic retinopathy (SHARLENE-tin--puh-thee). If not treated, this disease can cause vision loss or blindness.   Symptoms may include blurred or distorted vision, but many people have no symptoms. It's important to see your eye doctor regularly to check for problems.   Early treatment and good control can help protect your vision. Here are the things you can do to help prevent vision loss:      1. Keep your blood sugar levels under tight control.      2. Bring high blood pressure under control.      3. No smoking.      4. Have yearly dilated eye exams.       "

## 2022-10-21 NOTE — LETTER
"    10/21/2022         RE: Abelino Gracia  [address]  [place] [address]        Dear Colleague,    Thank you for referring your patient, Abelino Gracia, to the [facility]. Please see a copy of my visit note below.     Current Eye Medications:  none     Subjective: here for dilated exam - hx of pre-diabetes and kidney disease. Last eye exam 1 yr at [facility]. Wears distance gls as needed when not at home, comfortable without correction at near.  Told that his eye pressure is higher - no need for eye pressure drops at this time. Fhx - glaucoma mother.     PreDiabetic  Lab Results   Component Value Date    A1C 5.9 09/19/2022    A1C 6.2 04/28/2022    A1C 5.9 10/14/2021    A1C 6.1 07/15/2021    A1C 6.0 03/04/2021    A1C 6.4 08/28/2020    A1C 6.0 12/16/2019    A1C 6.4 07/30/2019    A1C 6.4 08/28/2018        Objective:  See Ophthalmology Exam.       Assessment:  Baseline dilated exam in patient with diabetes.  No diabetic retinopathy.      ICD-10-CM    1. Type 2 diabetes, HbA1C goal < 8% (H)  E11.9 Adult Eye  Referral      2. Examination of eyes and vision  Z01.00       3. Myopia of both eyes  H52.13            Plan: Glasses prescription given - optional  May use artificial tears up to four times a day (like Refresh Optive, Systane Balance, TheraTears, or generic artificial tears are ok. Avoid \"get the red out\" drops).   Call in June 2023 for an appointment in October 2023 for Complete Exam    Dr. Hutson [phone]            Again, thank you for allowing me to participate in the care of your patient.        Sincerely,        Tim Hutson MD    "

## 2022-11-18 ENCOUNTER — OFFICE VISIT (OUTPATIENT)
Dept: FAMILY MEDICINE | Facility: CLINIC | Age: 37
End: 2022-11-18
Payer: COMMERCIAL

## 2022-11-18 VITALS
OXYGEN SATURATION: 97 % | SYSTOLIC BLOOD PRESSURE: 108 MMHG | BODY MASS INDEX: 32.28 KG/M2 | DIASTOLIC BLOOD PRESSURE: 70 MMHG | WEIGHT: 225 LBS | TEMPERATURE: 97.4 F | HEART RATE: 87 BPM

## 2022-11-18 DIAGNOSIS — N25.0 RENAL OSTEODYSTROPHY: Primary | ICD-10-CM

## 2022-11-18 DIAGNOSIS — Z13.220 SCREENING FOR HYPERLIPIDEMIA: ICD-10-CM

## 2022-11-18 DIAGNOSIS — E11.9 TYPE 2 DIABETES, HBA1C GOAL < 8% (H): ICD-10-CM

## 2022-11-18 PROCEDURE — 99214 OFFICE O/P EST MOD 30 MIN: CPT | Performed by: INTERNAL MEDICINE

## 2022-11-18 RX ORDER — CALCITRIOL 0.25 UG/1
0.25 CAPSULE, LIQUID FILLED ORAL
Qty: 36 CAPSULE | Refills: 4 | Status: SHIPPED | OUTPATIENT
Start: 2022-11-19

## 2022-11-18 ASSESSMENT — ANXIETY QUESTIONNAIRES
GAD7 TOTAL SCORE: 9
1. FEELING NERVOUS, ANXIOUS, OR ON EDGE: MORE THAN HALF THE DAYS
6. BECOMING EASILY ANNOYED OR IRRITABLE: MORE THAN HALF THE DAYS
2. NOT BEING ABLE TO STOP OR CONTROL WORRYING: SEVERAL DAYS
IF YOU CHECKED OFF ANY PROBLEMS ON THIS QUESTIONNAIRE, HOW DIFFICULT HAVE THESE PROBLEMS MADE IT FOR YOU TO DO YOUR WORK, TAKE CARE OF THINGS AT HOME, OR GET ALONG WITH OTHER PEOPLE: SOMEWHAT DIFFICULT
7. FEELING AFRAID AS IF SOMETHING AWFUL MIGHT HAPPEN: SEVERAL DAYS
GAD7 TOTAL SCORE: 9
5. BEING SO RESTLESS THAT IT IS HARD TO SIT STILL: NOT AT ALL
3. WORRYING TOO MUCH ABOUT DIFFERENT THINGS: SEVERAL DAYS

## 2022-11-18 ASSESSMENT — PATIENT HEALTH QUESTIONNAIRE - PHQ9
SUM OF ALL RESPONSES TO PHQ QUESTIONS 1-9: 4
5. POOR APPETITE OR OVEREATING: MORE THAN HALF THE DAYS

## 2022-11-18 NOTE — PROGRESS NOTES
"  Assessment & Plan   Problem List Items Addressed This Visit     Type 2 diabetes, HbA1C goal < 8% (H)    Relevant Medications    calcitRIOL (ROCALTROL) 0.25 MCG capsule   Other Visit Diagnoses     Renal osteodystrophy    -  Primary    Relevant Medications    calcitRIOL (ROCALTROL) 0.25 MCG capsule    Other Relevant Orders    Comprehensive metabolic panel (BMP + Alb, Alk Phos, ALT, AST, Total. Bili, TP)    Phosphorus    Parathyroid Hormone Intact    Vitamin D Deficiency    Screening for hyperlipidemia                 ICD-10-CM    1. Renal osteodystrophy  N25.0 calcitRIOL (ROCALTROL) 0.25 MCG capsule     Comprehensive metabolic panel (BMP + Alb, Alk Phos, ALT, AST, Total. Bili, TP)     Phosphorus     Parathyroid Hormone Intact     Vitamin D Deficiency      2. Screening for hyperlipidemia  Z13.220       3. Type 2 diabetes, HbA1C goal < 8% (H)  E11.9         Phosphorous always goes the wrong way for renal hyperparathyroid or hyperparathyroid    Still iPTH is elevating   Will restart calcitriol and may need phosphorous repalcement ( could not tolerate the K phosph  I keep wondering if endocrine could help   To make sure we are not missing a secondary parathyroid or other issue driving the phosphorous            BMI:   Estimated body mass index is 32.28 kg/m  as calculated from the following:    Height as of 3/21/22: 1.778 m (5' 10\").    Weight as of this encounter: 102.1 kg (225 lb).   Weight management plan: Discussed healthy diet and exercise guidelines    Work on weight loss  Regular exercise    No follow-ups on file.    Roberth Tavares MD  [facility] PETTY Rascon is a 37 year old, presenting for the following health issues:  Results      History of Present Illness       Reason for visit:  Follow up on low phos and pth labs    He eats 2-3 servings of fruits and vegetables daily.He consumes 0 sweetened beverage(s) daily.He exercises with enough effort to increase his heart rate 9 or " less minutes per day.  He exercises with enough effort to increase his heart rate 3 or less days per week.   He is taking medications regularly.     Stomach did not feel right         Review of Systems   Constitutional, HEENT, cardiovascular, pulmonary, gi and gu systems are negative, except as otherwise noted.      Objective    /70 (BP Location: Right arm, Patient Position: Chair, Cuff Size: Adult Large)   Pulse 87   Temp 97.4  F (36.3  C)   Wt 102.1 kg (225 lb)   SpO2 97%   BMI 32.28 kg/m    Body mass index is 32.28 kg/m .  Physical Exam   GENERAL: healthy, alert and no distress  EYES: Eyes grossly normal to inspection, PERRL and conjunctivae and sclerae normal  HENT: ear canals and TM's normal, nose and mouth without ulcers or lesions  NECK: no adenopathy, no asymmetry, masses, or scars and thyroid normal to palpation  RESP: lungs clear to auscultation - no rales, rhonchi or wheezes  CV: regular rate and rhythm, normal S1 S2, no S3 or S4, no murmur, click or rub, no peripheral edema and peripheral pulses strong  ABDOMEN: soft, nontender, no hepatosplenomegaly, no masses and bowel sounds normal  MS: no gross musculoskeletal defects noted, no edema  SKIN: no suspicious lesions or rashes  NEURO: Normal strength and tone, mentation intact and speech normal  BACK: no CVA tenderness, no paralumbar tenderness  PSYCH: mentation appears normal, affect normal/bright    No results found for any visits on 11/18/22.

## 2022-12-09 ENCOUNTER — LAB (OUTPATIENT)
Dept: LAB | Facility: CLINIC | Age: 37
End: 2022-12-09
Payer: COMMERCIAL

## 2022-12-09 DIAGNOSIS — E11.9 TYPE 2 DIABETES, HBA1C GOAL < 8% (H): ICD-10-CM

## 2022-12-09 DIAGNOSIS — N25.0 RENAL OSTEODYSTROPHY: ICD-10-CM

## 2022-12-09 DIAGNOSIS — Z13.220 SCREENING FOR HYPERLIPIDEMIA: ICD-10-CM

## 2022-12-09 LAB
ALBUMIN SERPL-MCNC: 3.9 G/DL (ref 3.4–5)
ALP SERPL-CCNC: 96 U/L (ref 40–150)
ALT SERPL W P-5'-P-CCNC: 50 U/L (ref 0–70)
ANION GAP SERPL CALCULATED.3IONS-SCNC: 5 MMOL/L (ref 3–14)
AST SERPL W P-5'-P-CCNC: 25 U/L (ref 0–45)
BILIRUB SERPL-MCNC: 0.6 MG/DL (ref 0.2–1.3)
BUN SERPL-MCNC: 22 MG/DL (ref 7–30)
CALCIUM SERPL-MCNC: 9.4 MG/DL (ref 8.5–10.1)
CHLORIDE BLD-SCNC: 106 MMOL/L (ref 94–109)
CHOLEST SERPL-MCNC: 150 MG/DL
CO2 SERPL-SCNC: 29 MMOL/L (ref 20–32)
CREAT SERPL-MCNC: 1.95 MG/DL (ref 0.66–1.25)
DEPRECATED CALCIDIOL+CALCIFEROL SERPL-MC: 29 UG/L (ref 20–75)
FASTING STATUS PATIENT QL REPORTED: NORMAL
GFR SERPL CREATININE-BSD FRML MDRD: 44 ML/MIN/1.73M2
GLUCOSE BLD-MCNC: 123 MG/DL (ref 70–99)
HBA1C MFR BLD: 5.9 % (ref 0–5.6)
HDLC SERPL-MCNC: 40 MG/DL
LDLC SERPL CALC-MCNC: 86 MG/DL
NONHDLC SERPL-MCNC: 110 MG/DL
PHOSPHATE SERPL-MCNC: 2.2 MG/DL (ref 2.5–4.5)
POTASSIUM BLD-SCNC: 4.2 MMOL/L (ref 3.4–5.3)
PROT SERPL-MCNC: 7.7 G/DL (ref 6.8–8.8)
PTH-INTACT SERPL-MCNC: 38 PG/ML (ref 15–65)
SODIUM SERPL-SCNC: 140 MMOL/L (ref 133–144)
TRIGL SERPL-MCNC: 121 MG/DL

## 2022-12-09 PROCEDURE — 83970 ASSAY OF PARATHORMONE: CPT

## 2022-12-09 PROCEDURE — 84100 ASSAY OF PHOSPHORUS: CPT

## 2022-12-09 PROCEDURE — 80061 LIPID PANEL: CPT

## 2022-12-09 PROCEDURE — 80053 COMPREHEN METABOLIC PANEL: CPT

## 2022-12-09 PROCEDURE — 82306 VITAMIN D 25 HYDROXY: CPT

## 2022-12-09 PROCEDURE — 36415 COLL VENOUS BLD VENIPUNCTURE: CPT

## 2022-12-09 PROCEDURE — 83036 HEMOGLOBIN GLYCOSYLATED A1C: CPT

## 2022-12-21 NOTE — Clinical Note
Ray Mena.  I am quickly weaning Abelino off topamax.  Once his phosphorous and creatine stabilize, would you have any concerns over use of victoza to help with weight loss? present

## 2023-02-22 DIAGNOSIS — N18.31 STAGE 3A CHRONIC KIDNEY DISEASE (H): Primary | ICD-10-CM

## 2023-03-20 ENCOUNTER — OFFICE VISIT (OUTPATIENT)
Dept: NEPHROLOGY | Facility: CLINIC | Age: 38
End: 2023-03-20
Payer: COMMERCIAL

## 2023-03-20 ENCOUNTER — LAB (OUTPATIENT)
Dept: LAB | Facility: CLINIC | Age: 38
End: 2023-03-20
Payer: COMMERCIAL

## 2023-03-20 VITALS
OXYGEN SATURATION: 99 % | WEIGHT: 221 LBS | BODY MASS INDEX: 31.71 KG/M2 | DIASTOLIC BLOOD PRESSURE: 78 MMHG | RESPIRATION RATE: 20 BRPM | HEART RATE: 98 BPM | SYSTOLIC BLOOD PRESSURE: 125 MMHG

## 2023-03-20 DIAGNOSIS — N25.81 SECONDARY RENAL HYPERPARATHYROIDISM (H): Primary | ICD-10-CM

## 2023-03-20 DIAGNOSIS — E83.39 HYPOPHOSPHATEMIA: ICD-10-CM

## 2023-03-20 DIAGNOSIS — N18.31 STAGE 3A CHRONIC KIDNEY DISEASE (H): ICD-10-CM

## 2023-03-20 DIAGNOSIS — E83.39 HYPOPHOSPHATEMIA: Primary | ICD-10-CM

## 2023-03-20 DIAGNOSIS — N05.1 FSGS (FOCAL SEGMENTAL GLOMERULOSCLEROSIS): ICD-10-CM

## 2023-03-20 DIAGNOSIS — I10 BENIGN ESSENTIAL HYPERTENSION: ICD-10-CM

## 2023-03-20 LAB
ALBUMIN MFR UR ELPH: 21.7 MG/DL (ref 1–14)
ALBUMIN SERPL-MCNC: 3.7 G/DL (ref 3.4–5)
ANION GAP SERPL CALCULATED.3IONS-SCNC: 5 MMOL/L (ref 3–14)
BUN SERPL-MCNC: 29 MG/DL (ref 7–30)
CALCIUM SERPL-MCNC: 9.1 MG/DL (ref 8.5–10.1)
CHLORIDE BLD-SCNC: 112 MMOL/L (ref 94–109)
CO2 SERPL-SCNC: 22 MMOL/L (ref 20–32)
CREAT SERPL-MCNC: 1.79 MG/DL (ref 0.66–1.25)
CREAT UR-MCNC: 105 MG/DL
GFR SERPL CREATININE-BSD FRML MDRD: 49 ML/MIN/1.73M2
GLUCOSE BLD-MCNC: 151 MG/DL (ref 70–99)
HGB BLD-MCNC: 16 G/DL (ref 13.3–17.7)
PHOSPHATE SERPL-MCNC: 1.4 MG/DL (ref 2.5–4.5)
POTASSIUM BLD-SCNC: 4 MMOL/L (ref 3.4–5.3)
PROT/CREAT 24H UR: 0.21 MG/MG CR (ref 0–0.2)
PTH-INTACT SERPL-MCNC: 57 PG/ML (ref 15–65)
SODIUM SERPL-SCNC: 139 MMOL/L (ref 133–144)

## 2023-03-20 PROCEDURE — 83970 ASSAY OF PARATHORMONE: CPT

## 2023-03-20 PROCEDURE — 85018 HEMOGLOBIN: CPT

## 2023-03-20 PROCEDURE — 36415 COLL VENOUS BLD VENIPUNCTURE: CPT

## 2023-03-20 PROCEDURE — 84156 ASSAY OF PROTEIN URINE: CPT

## 2023-03-20 PROCEDURE — 80069 RENAL FUNCTION PANEL: CPT

## 2023-03-20 PROCEDURE — 82306 VITAMIN D 25 HYDROXY: CPT

## 2023-03-20 PROCEDURE — 99214 OFFICE O/P EST MOD 30 MIN: CPT | Performed by: INTERNAL MEDICINE

## 2023-03-20 ASSESSMENT — PAIN SCALES - GENERAL: PAINLEVEL: NO PAIN (0)

## 2023-03-20 NOTE — PROGRESS NOTES
03/20/23     HISTORY OF PRESENT ILLNESS:  Abelino Gracia is a 38 year-old male who has had elevated creatinine levels dating back to 2006 according to [facility] records.       History taken from previous notes: His creatinine has ranged anywhere from 1.3-1.7 in his [facility] records.  He was seen by nephrology on one occasion while inpatient at [facility] in 2009 and his kidney injury at that time was felt to be related to long-standing heavy use of NSAIDs.  To briefly summarize risk factors for CKD: heavy NSAID use in the past, previous etoh abuse (sober since 09). Addt hx includes DESHAUN. His serologic workup including C3, C4, ANCA, Anti-GBM, and immunofixations were normal. He is felt to have QURESHI as the cause of his liver abnormalities.               His previous baseline kidney function had been 1.8-2 but has been running slightly higher at 1.8-2.3 in the past year.  His creatinine is 2.08 at this time.  He has been on Topamax for weight loss however without weight loss success.  His phosphorus was noted to be 1.6 at this time and he does report some muscle related weakness.  He is taking 1000 units of vitamin D daily.     03/23/20: no home BP readings at this time (no cuff). Clinic Readings have been less than 130/80. Weight was 252 lbs down to 233 lbs in Dec. Has gained some of that back but still kept most off. No NSAIDs. No swelling concerns. No longer on topamax.      09/22/20: video visit. Since last visit, he underwent kidney biopsy on 7/15/20 which showed:  FINAL DIAGNOSIS:   Native kidney biopsy with glomerulomegaly and nonspecific chronic   interstitial changes   No success with weight loss so far - more stress. We spent time discussing his biopsy findings. Overall doing well - no specific complaints at this time. No edema.     03/22/21: video visit. GFR 36-39 for the past year. Only minimal proteinuria. On losartan 12.5 mg daily. Feeling more cold in his hands and feet. No constipation/diarrhea. No numbness  /tingling. BP less than 130 when he checks it but on/off with checking it at home. He has received first dose of the vaccine. No swelling. Working from home.     3/21/22: in person visit. Creatinine had been 2.1-2.2 but slightly improved most recently at 1.95 now as well as at his Nov lab. UPCR was 0.21 g/g on last check. He had bilateral carpal tunnel repair since last visit (nov and dec).  but was 97 on one occasion in Dec. No edema concerns.     03/20/23: in person visit. GFR was previously in the upper 30s but has been 40-44 since Nov 2021. He has maintained his weight at 221 lbs most recently - had been 258 lbs in Dec 2021. He is not using CPAP mask - never tolerated. He remains away from tobacco as he has for years. He does use some ibuprofen intermittently for migraines - had to come off of propanolol due to lightheadedness on standing.        acetaminophen (TYLENOL) 325 MG tablet, Take 325 mg by mouth as needed   albuterol (PROAIR HFA) 108 (90 Base) MCG/ACT inhaler, Inhale 2 puffs into the lungs every 4 hours as needed  buPROPion (WELLBUTRIN XL) 300 MG 24 hr tablet, Take 1 tablet (300 mg) by mouth daily  calcitRIOL (ROCALTROL) 0.25 MCG capsule, Take 1 capsule (0.25 mcg) by mouth three times a week  fluticasone (FLONASE) 50 MCG/ACT nasal spray, Spray 1-2 sprays into both nostrils daily as needed   liraglutide (VICTOZA PEN) 18 MG/3ML solution, ADMINISTER 0.6 MG UNDER THE SKIN DAILY  losartan (COZAAR) 25 MG tablet, Take 0.5 tablets (12.5 mg) by mouth daily To fill when current expires  melatonin 3 MG tablet, Take 6 mg by mouth nightly as needed for sleep   traZODone (DESYREL) 50 MG tablet, Take 1-2 tablets ( mg) by mouth At Bedtime  Bioflavonoid Products (VITAMIN C) CHEW, Take 500 mg by mouth daily (Patient not taking: Reported on 7/28/2022)  blood glucose monitoring (Biolog[employer] Modular CONTOUR MONITOR) meter device kit, Use to test blood sugars one time daily. Strips Ex: 7/31/2019, Lot: [id number], SN:  [id number] (Patient not taking: Reported on 7/28/2022)  blood glucose monitoring (SARA CONTOUR) test strip, Use to test blood sugars one time daily (Patient not taking: Reported on 7/28/2022)  blood glucose monitoring (SARA MICROLET) lancets, Use to test blood sugar 1 times daily (Patient not taking: Reported on 7/28/2022)  Calcium Carbonate Antacid (TUMS PO), Take by mouth as needed (Patient not taking: Reported on 10/21/2022)  insulin pen needle (31G X 5 MM) 31G X 5 MM miscellaneous, Use 1 pen needles daily or as directed. (Patient not taking: Reported on 3/20/2023)    No current facility-administered medications on file prior to visit.      Exam:  /78   Pulse 98   Resp 20   Wt 100.2 kg (221 lb)   SpO2 99%   BMI 31.71 kg/m    GENERAL APPEARANCE: alert and no distress  CV - RRR  Ext - no edema    Results:   Lab on 03/20/2023   Component Date Value Ref Range Status     Sodium 03/20/2023 139  133 - 144 mmol/L Preliminary     Potassium 03/20/2023 4.0  3.4 - 5.3 mmol/L Preliminary     Chloride 03/20/2023 112 (H)  94 - 109 mmol/L Preliminary     Hemoglobin 03/20/2023 16.0  13.3 - 17.7 g/dL Final      Lab results were reviewed and interpreted.       Assessment/Plan:   1.  CKD Stage 3:  Biopsy from July 2020 is consistent with nonspecific interstitial changes and glomerulomegaly. His biggest risk factor for kidney disease is related to long-standing NSAID use. He is now away from NSAIDs. Educated on the benefits of weight loss and blood pressure control. Will monitor routinely for now. On losartan 12.5 mg daily for protein lowering effect - pleased to see that his last UPCR was normal in Sept 2022.       2.  QURESHI: Encouraged ongoing weight loss not only to help his kidney function/proteinuria but also avoid addt liver damage related to fatty liver disease.  Also encouraged avoidance of etoh. He has been seen by hepatology.      3. Prediabetes - followed by PCP - last A1C 5.9% in December     4. Obesity: has  had success with weight loss - weight 221 lbs down from 258 lbs in Dec 2021.     5. Hypophosphatemia: was started on calcitriol in sept this year. Unclear etiology of hypophosphatemia. Will start by adding on vitamin D studies. Was slightly low at 29 in Dec. Asked him to go ahead and start 1000 units cholecalciferol for now. Will monitor phosphorus In the coming weeks. Discussed increasing phos intake. I question whether he has tubular phos loss in the setting of his interstitial fibrosis.      Patient Instructions   1. Trial stopping the calcitriol  2. Start cholecalciferol 1000 units daily  3. Repeat labs in 3-4 weeks for one more phosphorus check.   4. Labs in 6 months  5. Follow-up in one year.        Amina Mena, DO

## 2023-03-20 NOTE — NURSING NOTE
Abelino Gracia's goals for this visit include: NONE  He requests these members of his care team be copied on today's visit information: YES    PCP: Roberth Tavares    Referring Provider:  No referring provider defined for this encounter.    /78   Pulse 98   Resp 20   Wt 100.2 kg (221 lb)   SpO2 99%   BMI 31.71 kg/m      Do you need any medication refills at today's visit? NONE    Keivn Gonzalez, EMT

## 2023-03-20 NOTE — PATIENT INSTRUCTIONS
Trial stopping the calcitriol  Start cholecalciferol 1000 units daily  Repeat labs in 3-4 weeks for one more phosphorus check.   Labs in 6 months  Follow-up in one year.

## 2023-03-22 LAB
DEPRECATED CALCIDIOL+CALCIFEROL SERPL-MC: <27 UG/L (ref 20–75)
VITAMIN D2 SERPL-MCNC: <5 UG/L
VITAMIN D3 SERPL-MCNC: 22 UG/L

## 2023-04-14 ENCOUNTER — LAB (OUTPATIENT)
Dept: LAB | Facility: CLINIC | Age: 38
End: 2023-04-14
Payer: COMMERCIAL

## 2023-04-14 ENCOUNTER — DOCUMENTATION ONLY (OUTPATIENT)
Dept: LAB | Facility: CLINIC | Age: 38
End: 2023-04-14

## 2023-04-14 DIAGNOSIS — N25.81 SECONDARY RENAL HYPERPARATHYROIDISM (H): ICD-10-CM

## 2023-04-14 DIAGNOSIS — E11.9 TYPE 2 DIABETES, HBA1C GOAL < 8% (H): ICD-10-CM

## 2023-04-14 DIAGNOSIS — N18.31 STAGE 3A CHRONIC KIDNEY DISEASE (H): ICD-10-CM

## 2023-04-14 DIAGNOSIS — N18.30 CKD (CHRONIC KIDNEY DISEASE) STAGE 3, GFR 30-59 ML/MIN (H): ICD-10-CM

## 2023-04-14 DIAGNOSIS — E83.39 HYPOPHOSPHATEMIA: ICD-10-CM

## 2023-04-14 LAB
ALBUMIN SERPL-MCNC: 3.8 G/DL (ref 3.4–5)
ANION GAP SERPL CALCULATED.3IONS-SCNC: 3 MMOL/L (ref 3–14)
BUN SERPL-MCNC: 30 MG/DL (ref 7–30)
CALCIUM SERPL-MCNC: 8.7 MG/DL (ref 8.5–10.1)
CHLORIDE BLD-SCNC: 110 MMOL/L (ref 94–109)
CO2 SERPL-SCNC: 25 MMOL/L (ref 20–32)
CREAT SERPL-MCNC: 1.85 MG/DL (ref 0.66–1.25)
GFR SERPL CREATININE-BSD FRML MDRD: 47 ML/MIN/1.73M2
GLUCOSE BLD-MCNC: 139 MG/DL (ref 70–99)
HBA1C MFR BLD: 6.2 % (ref 0–5.6)
PHOSPHATE SERPL-MCNC: 2.8 MG/DL (ref 2.5–4.5)
POTASSIUM BLD-SCNC: 4 MMOL/L (ref 3.4–5.3)
SODIUM SERPL-SCNC: 138 MMOL/L (ref 133–144)

## 2023-04-14 PROCEDURE — 80069 RENAL FUNCTION PANEL: CPT

## 2023-04-14 PROCEDURE — 83036 HEMOGLOBIN GLYCOSYLATED A1C: CPT

## 2023-04-14 PROCEDURE — 36415 COLL VENOUS BLD VENIPUNCTURE: CPT

## 2023-04-14 NOTE — PROGRESS NOTES
Patient came in today for labs per dr burkett. States he needs a phosphorus tested, but it was not ordered, blood drawn, please place orders in Epic thanks

## 2023-04-23 ENCOUNTER — HEALTH MAINTENANCE LETTER (OUTPATIENT)
Age: 38
End: 2023-04-23

## 2023-07-14 NOTE — PROGRESS NOTES
Follow up right carpal tunnel release on 12/17/21.  Also had left carpal tunnel release 12/3/21.  The splint caused blistering on his forearm.  This was a blood blister and is now resolving.  Wound is healing well.   Sutures are removed.  He  has no numbness of fingers.    Start scar massage with vitamin-E cream.  Use night splint for 4 weeks.    Resume activity as tolerated.  Return to clinic 4 weeks     Weight Visit Note    Subjective    Review food log     Had been following protein diet     Meal Plan/nutrition     Started prenatal protein shake   Prenatal vitamins make her feel sick so has protein shake 830-9   Lunch 3pm , kind bar and string cheese  Dinner: meat and veggies , eats a very early dinner  Snacking after dinner: handful of tortilla chips a couple nights a week     Lunch and dinner very close in proximity     Harder to eat on plan when at home for work     Wants to hold off on seeing Dr Dan sleep medicine for now     Late night eating is a lot better     Physical Activity   Daily walks     Sleep (goal 7-9 hours)   Was tired and would eat when she was tired   Had been referred to sleep specialist   Sleeping well with it, but cutting back on ambien now with plans to wean further. Prescribed by pcp       Had injection in the back and is doing better   Now walking for exercise.     Treatments     AOM contrave . Felt more full on it but didn't fall asleep on it as well. Felt light headed on it and then went back to one tablet twice a day and then stopped it. So is off the contrave for last few weeks     Pill caused fatigue but was not able to sleep     Had more cravings late at night and during the day also     Objective:   Vitals:    07/14/23 1434   BP: (!) 132/92   Pulse: 90   Temp: 98 °F (36.7 °C)      Physical Exam  Constitutional:       Appearance: Normal appearance.   HENT:      Head: Normocephalic and atraumatic.   Cardiovascular:      Rate and Rhythm: Normal rate and regular rhythm.      Pulses: Normal pulses.      Heart sounds: Normal heart sounds. No murmur heard.  Pulmonary:      Effort: Pulmonary effort is normal.      Breath sounds: Normal breath sounds.   Musculoskeletal:      Right lower leg: No edema.      Left lower leg: No edema.   Skin:     General: Skin is warm.      Capillary Refill: Capillary refill takes less than 2 seconds.   Neurological:      General: No focal deficit  present.      Mental Status: She is alert.   Psychiatric:         Mood and Affect: Mood normal.         Initial weight 245  Today's weight 252    Assessment/Plan  1. BMI 40.0-44.9, adult (CMD)  Nutrition plan     Meal prep   You are following a lower carb diet     Breakfast: protein shake with kale/chard, blueberries . Coconut milk . Prenatal protein powder  Snack at noon:  set timer for noon. String cheese. Turkey breast, broccoli   Dinner: dish own food. Fish or turkey or red meat , and a veggies     goal for meeting this nutrition plan 80% of the time       Kind bar may not have enough protein   Protein bars goal less than 5 grams of sugar and goal of 10 grams of protein  Pure protein bar OR quest protein bar     Offered nutritionist referral let me know if wishes to go ahead with it    Physical activity as tolerated: keep up the great work with walking     2. Other insomnia declines sleep medicine/counseling follow up. Let me know if changes her mind.     3. Elevated blood pressure reading: contact PCP to discuss , elevated blood pressure can increase risk of complications during pregnancy as well as long-term high blood pressure can increase the risk of heart disease and kidney disease.  She will contact her PCP.    4. Class 3 severe obesity due to excess calories with body mass index (BMI) of 40.0 to 44.9 in adult, unspecified whether serious comorbidity present (CMD): as above . She declines medication management for weight loss, did not tolerate contrave nor phentermine. Planning for pregnancy so will defer all weight medications.     She does have some night eating syndrome history which I suspect is a medication side effect from the Ambien.  She is now taking half an Ambien tablet mg daily with plans to wean off slowly from there.  She declines help from sleep medicine team , let me know if changes her mind .  We discussed more regular eating schedule, stop eating after dinner and we discussed emotional  eating verses physiologic hunger.      Greater than 50% of this 40 minute visit was spent on counseling and coordination of care.

## 2023-07-15 ENCOUNTER — HEALTH MAINTENANCE LETTER (OUTPATIENT)
Age: 38
End: 2023-07-15

## 2023-09-01 ENCOUNTER — OFFICE VISIT (OUTPATIENT)
Dept: FAMILY MEDICINE | Facility: CLINIC | Age: 38
End: 2023-09-01
Payer: COMMERCIAL

## 2023-09-01 VITALS
RESPIRATION RATE: 18 BRPM | OXYGEN SATURATION: 96 % | BODY MASS INDEX: 33.15 KG/M2 | SYSTOLIC BLOOD PRESSURE: 119 MMHG | TEMPERATURE: 98.6 F | WEIGHT: 231 LBS | HEART RATE: 97 BPM | DIASTOLIC BLOOD PRESSURE: 83 MMHG

## 2023-09-01 DIAGNOSIS — E11.9 TYPE 2 DIABETES, HBA1C GOAL < 8% (H): ICD-10-CM

## 2023-09-01 DIAGNOSIS — E55.9 VITAMIN D DEFICIENCY: ICD-10-CM

## 2023-09-01 DIAGNOSIS — N18.32 STAGE 3B CHRONIC KIDNEY DISEASE (H): Primary | ICD-10-CM

## 2023-09-01 DIAGNOSIS — N05.1 FSGS (FOCAL SEGMENTAL GLOMERULOSCLEROSIS): ICD-10-CM

## 2023-09-01 LAB
ALBUMIN SERPL BCG-MCNC: 4.5 G/DL (ref 3.5–5.2)
ALP SERPL-CCNC: 115 U/L (ref 40–129)
ALT SERPL W P-5'-P-CCNC: 49 U/L (ref 0–70)
ANION GAP SERPL CALCULATED.3IONS-SCNC: 14 MMOL/L (ref 7–15)
AST SERPL W P-5'-P-CCNC: 35 U/L (ref 0–45)
BILIRUB SERPL-MCNC: 0.5 MG/DL
BUN SERPL-MCNC: 29.3 MG/DL (ref 6–20)
CALCIUM SERPL-MCNC: 9.5 MG/DL (ref 8.6–10)
CHLORIDE SERPL-SCNC: 103 MMOL/L (ref 98–107)
CREAT SERPL-MCNC: 2.23 MG/DL (ref 0.67–1.17)
CREAT UR-MCNC: 32.9 MG/DL
DEPRECATED CALCIDIOL+CALCIFEROL SERPL-MC: 45 UG/L (ref 20–75)
DEPRECATED HCO3 PLAS-SCNC: 22 MMOL/L (ref 22–29)
GFR SERPL CREATININE-BSD FRML MDRD: 37 ML/MIN/1.73M2
GLUCOSE SERPL-MCNC: 104 MG/DL (ref 70–99)
HBA1C MFR BLD: 6 % (ref 0–5.6)
MICROALBUMIN UR-MCNC: 12.8 MG/L
MICROALBUMIN/CREAT UR: 38.91 MG/G CR (ref 0–17)
PHOSPHATE SERPL-MCNC: 2.9 MG/DL (ref 2.5–4.5)
POTASSIUM SERPL-SCNC: 4.5 MMOL/L (ref 3.4–5.3)
PROT SERPL-MCNC: 7.8 G/DL (ref 6.4–8.3)
SODIUM SERPL-SCNC: 139 MMOL/L (ref 136–145)

## 2023-09-01 PROCEDURE — 84100 ASSAY OF PHOSPHORUS: CPT | Performed by: INTERNAL MEDICINE

## 2023-09-01 PROCEDURE — 82570 ASSAY OF URINE CREATININE: CPT | Performed by: INTERNAL MEDICINE

## 2023-09-01 PROCEDURE — 80053 COMPREHEN METABOLIC PANEL: CPT | Performed by: INTERNAL MEDICINE

## 2023-09-01 PROCEDURE — 36415 COLL VENOUS BLD VENIPUNCTURE: CPT | Performed by: INTERNAL MEDICINE

## 2023-09-01 PROCEDURE — 99214 OFFICE O/P EST MOD 30 MIN: CPT | Performed by: INTERNAL MEDICINE

## 2023-09-01 PROCEDURE — 82306 VITAMIN D 25 HYDROXY: CPT | Performed by: INTERNAL MEDICINE

## 2023-09-01 PROCEDURE — 83036 HEMOGLOBIN GLYCOSYLATED A1C: CPT | Performed by: INTERNAL MEDICINE

## 2023-09-01 PROCEDURE — 82043 UR ALBUMIN QUANTITATIVE: CPT | Performed by: INTERNAL MEDICINE

## 2023-09-01 RX ORDER — HYDROXYZINE HYDROCHLORIDE 25 MG/1
TABLET, FILM COATED ORAL
COMMUNITY
Start: 2023-06-01

## 2023-09-01 RX ORDER — LOSARTAN POTASSIUM 25 MG/1
12.5 TABLET ORAL DAILY
Qty: 45 TABLET | Refills: 3 | Status: SHIPPED | OUTPATIENT
Start: 2023-09-01 | End: 2023-09-21

## 2023-09-01 RX ORDER — BUSPIRONE HYDROCHLORIDE 15 MG/1
1 TABLET ORAL
COMMUNITY
Start: 2023-04-16

## 2023-09-01 ASSESSMENT — ASTHMA QUESTIONNAIRES
QUESTION_4 LAST FOUR WEEKS HOW OFTEN HAVE YOU USED YOUR RESCUE INHALER OR NEBULIZER MEDICATION (SUCH AS ALBUTEROL): NOT AT ALL
ACT_TOTALSCORE: 25
ACT_TOTALSCORE: 25
QUESTION_3 LAST FOUR WEEKS HOW OFTEN DID YOUR ASTHMA SYMPTOMS (WHEEZING, COUGHING, SHORTNESS OF BREATH, CHEST TIGHTNESS OR PAIN) WAKE YOU UP AT NIGHT OR EARLIER THAN USUAL IN THE MORNING: NOT AT ALL
QUESTION_1 LAST FOUR WEEKS HOW MUCH OF THE TIME DID YOUR ASTHMA KEEP YOU FROM GETTING AS MUCH DONE AT WORK, SCHOOL OR AT HOME: NONE OF THE TIME
QUESTION_2 LAST FOUR WEEKS HOW OFTEN HAVE YOU HAD SHORTNESS OF BREATH: NOT AT ALL
QUESTION_5 LAST FOUR WEEKS HOW WOULD YOU RATE YOUR ASTHMA CONTROL: COMPLETELY CONTROLLED

## 2023-09-01 ASSESSMENT — PATIENT HEALTH QUESTIONNAIRE - PHQ9
SUM OF ALL RESPONSES TO PHQ QUESTIONS 1-9: 2
SUM OF ALL RESPONSES TO PHQ QUESTIONS 1-9: 2
10. IF YOU CHECKED OFF ANY PROBLEMS, HOW DIFFICULT HAVE THESE PROBLEMS MADE IT FOR YOU TO DO YOUR WORK, TAKE CARE OF THINGS AT HOME, OR GET ALONG WITH OTHER PEOPLE: NOT DIFFICULT AT ALL

## 2023-09-01 NOTE — PROGRESS NOTES
Assessment & Plan   Problem List Items Addressed This Visit       CKD (chronic kidney disease) stage 3, GFR 30-59 ml/min (H) - Primary (Chronic)    Relevant Medications    semaglutide (OZEMPIC) 2 MG/3ML pen    Other Relevant Orders    Albumin Random Urine Quantitative with Creat Ratio    REVIEW OF HEALTH MAINTENANCE PROTOCOL ORDERS (Completed)    Comprehensive metabolic panel (BMP + Alb, Alk Phos, ALT, AST, Total. Bili, TP)    Phosphorus    Type 2 diabetes, HbA1C goal < 8% (H)    Relevant Medications    semaglutide (OZEMPIC) 2 MG/3ML pen    Other Relevant Orders    HEMOGLOBIN A1C (Completed)     Other Visit Diagnoses       Vitamin D deficiency        Relevant Orders    Vitamin D Deficiency    FSGS (focal segmental glomerulosclerosis)        Relevant Medications    semaglutide (OZEMPIC) 2 MG/3ML pen    losartan (COZAAR) 25 MG tablet           Patient would like to switch to ozempic as he has had plateau in weight loss with victoza and diet and exercise   Is making good progress on the GLP -1 inhibitors               Work on weight loss  Regular exercise    Roberth Tavares MD  [facility] PETTY Rascon is a 38 year old, presenting for the following health issues:  Diabetes        9/1/2023    10:06 AM   Additional Questions   Roomed by Katalina       History of Present Illness       CKD: He uses over the counter pain medication, including Ibuprofen, a few times a month.    Diabetes:   He presents for follow up of diabetes.    He is not checking blood glucose.     He is aware of hypoglycemia symptoms including other.   He is concerned about other and blood sugar frequently over 200.   He is having excessive thirst.  The patient has not had a diabetic eye exam in the last 12 months.          He eats 0-1 servings of fruits and vegetables daily.He consumes 1 sweetened beverage(s) daily.He exercises with enough effort to increase his heart rate 9 or less minutes per day.  He exercises with enough  effort to increase his heart rate 3 or less days per week.   He is taking medications regularly.               Review of Systems   Constitutional, HEENT, cardiovascular, pulmonary, gi and gu systems are negative, except as otherwise noted.      Objective    /83 (BP Location: Left arm, Patient Position: Chair, Cuff Size: Adult Regular)   Pulse 97   Temp 98.6  F (37  C)   Resp 18   Wt 104.8 kg (231 lb)   SpO2 96%   BMI 33.15 kg/m    Body mass index is 33.15 kg/m .  Physical Exam   GENERAL: healthy, alert and no distress  EYES: Eyes grossly normal to inspection, PERRL and conjunctivae and sclerae normal  HENT: ear canals and TM's normal, nose and mouth without ulcers or lesions  NECK: no adenopathy, no asymmetry, masses, or scars and thyroid normal to palpation  RESP: lungs clear to auscultation - no rales, rhonchi or wheezes  CV: regular rate and rhythm, normal S1 S2, no S3 or S4, no murmur, click or rub, no peripheral edema and peripheral pulses strong  ABDOMEN: soft, nontender, no hepatosplenomegaly, no masses and bowel sounds normal  MS: no gross musculoskeletal defects noted, no edema  SKIN: no suspicious lesions or rashes  NEURO: Normal strength and tone, mentation intact and speech normal  BACK: no CVA tenderness, no paralumbar tenderness  LYMPH: no cervical, supraclavicular, axillary, or inguinal adenopathy    Results for orders placed or performed in visit on 09/01/23   HEMOGLOBIN A1C     Status: Abnormal   Result Value Ref Range    Hemoglobin A1C 6.0 (H) 0.0 - 5.6 %

## 2023-09-06 DIAGNOSIS — N18.32 STAGE 3B CHRONIC KIDNEY DISEASE (H): Primary | Chronic | ICD-10-CM

## 2023-09-09 ENCOUNTER — LAB (OUTPATIENT)
Dept: LAB | Facility: CLINIC | Age: 38
End: 2023-09-09
Payer: COMMERCIAL

## 2023-09-09 DIAGNOSIS — N18.31 STAGE 3A CHRONIC KIDNEY DISEASE (H): ICD-10-CM

## 2023-09-09 DIAGNOSIS — N18.32 STAGE 3B CHRONIC KIDNEY DISEASE (H): Chronic | ICD-10-CM

## 2023-09-09 DIAGNOSIS — N25.81 SECONDARY RENAL HYPERPARATHYROIDISM (H): ICD-10-CM

## 2023-09-09 LAB
ALBUMIN MFR UR ELPH: <6 MG/DL
ALBUMIN SERPL BCG-MCNC: 4.3 G/DL (ref 3.5–5.2)
ANION GAP SERPL CALCULATED.3IONS-SCNC: 11 MMOL/L (ref 7–15)
BUN SERPL-MCNC: 24 MG/DL (ref 6–20)
CALCIUM SERPL-MCNC: 9.6 MG/DL (ref 8.6–10)
CHLORIDE SERPL-SCNC: 103 MMOL/L (ref 98–107)
CREAT SERPL-MCNC: 2.2 MG/DL (ref 0.67–1.17)
CREAT UR-MCNC: 40.3 MG/DL
DEPRECATED HCO3 PLAS-SCNC: 24 MMOL/L (ref 22–29)
EGFRCR SERPLBLD CKD-EPI 2021: 38 ML/MIN/1.73M2
GLUCOSE SERPL-MCNC: 97 MG/DL (ref 70–99)
HGB BLD-MCNC: 16.1 G/DL (ref 13.3–17.7)
PHOSPHATE SERPL-MCNC: 2.8 MG/DL (ref 2.5–4.5)
POTASSIUM SERPL-SCNC: 4.4 MMOL/L (ref 3.4–5.3)
PROT/CREAT 24H UR: NORMAL MG/G{CREAT}
PTH-INTACT SERPL-MCNC: 62 PG/ML (ref 15–65)
SODIUM SERPL-SCNC: 138 MMOL/L (ref 136–145)

## 2023-09-09 PROCEDURE — 83970 ASSAY OF PARATHORMONE: CPT

## 2023-09-09 PROCEDURE — 84156 ASSAY OF PROTEIN URINE: CPT

## 2023-09-09 PROCEDURE — 36415 COLL VENOUS BLD VENIPUNCTURE: CPT

## 2023-09-09 PROCEDURE — 85018 HEMOGLOBIN: CPT

## 2023-09-09 PROCEDURE — 80069 RENAL FUNCTION PANEL: CPT

## 2023-09-09 PROCEDURE — 82306 VITAMIN D 25 HYDROXY: CPT

## 2023-09-12 LAB
DEPRECATED CALCIDIOL+CALCIFEROL SERPL-MC: <51 UG/L (ref 20–75)
VITAMIN D2 SERPL-MCNC: <5 UG/L
VITAMIN D3 SERPL-MCNC: 46 UG/L

## 2023-09-20 DIAGNOSIS — N05.1 FSGS (FOCAL SEGMENTAL GLOMERULOSCLEROSIS): ICD-10-CM

## 2023-09-21 RX ORDER — LOSARTAN POTASSIUM 25 MG/1
12.5 TABLET ORAL DAILY
Qty: 45 TABLET | Refills: 3 | Status: SHIPPED | OUTPATIENT
Start: 2023-09-21

## 2023-09-29 ENCOUNTER — LAB (OUTPATIENT)
Dept: LAB | Facility: CLINIC | Age: 38
End: 2023-09-29
Payer: COMMERCIAL

## 2023-09-29 DIAGNOSIS — N18.31 STAGE 3A CHRONIC KIDNEY DISEASE (H): ICD-10-CM

## 2023-09-29 LAB
ALBUMIN SERPL BCG-MCNC: 4.1 G/DL (ref 3.5–5.2)
ANION GAP SERPL CALCULATED.3IONS-SCNC: 13 MMOL/L (ref 7–15)
BUN SERPL-MCNC: 22.7 MG/DL (ref 6–20)
CALCIUM SERPL-MCNC: 9.7 MG/DL (ref 8.6–10)
CHLORIDE SERPL-SCNC: 101 MMOL/L (ref 98–107)
CREAT SERPL-MCNC: 2.2 MG/DL (ref 0.67–1.17)
DEPRECATED HCO3 PLAS-SCNC: 24 MMOL/L (ref 22–29)
EGFRCR SERPLBLD CKD-EPI 2021: 38 ML/MIN/1.73M2
GLUCOSE SERPL-MCNC: 130 MG/DL (ref 70–99)
PHOSPHATE SERPL-MCNC: 2.4 MG/DL (ref 2.5–4.5)
POTASSIUM SERPL-SCNC: 4.4 MMOL/L (ref 3.4–5.3)
SODIUM SERPL-SCNC: 138 MMOL/L (ref 135–145)

## 2023-09-29 PROCEDURE — 36415 COLL VENOUS BLD VENIPUNCTURE: CPT

## 2023-09-29 PROCEDURE — 80069 RENAL FUNCTION PANEL: CPT

## 2023-10-21 DIAGNOSIS — F41.9 MODERATE ANXIETY: ICD-10-CM

## 2023-10-23 RX ORDER — BUPROPION HYDROCHLORIDE 300 MG/1
300 TABLET ORAL DAILY
Qty: 90 TABLET | Refills: 1 | Status: SHIPPED | OUTPATIENT
Start: 2023-10-23

## 2023-12-02 ENCOUNTER — HEALTH MAINTENANCE LETTER (OUTPATIENT)
Age: 38
End: 2023-12-02

## 2023-12-21 ENCOUNTER — TELEPHONE (OUTPATIENT)
Dept: FAMILY MEDICINE | Facility: CLINIC | Age: 38
End: 2023-12-21
Payer: COMMERCIAL

## 2023-12-21 ENCOUNTER — MYC MEDICAL ADVICE (OUTPATIENT)
Dept: FAMILY MEDICINE | Facility: CLINIC | Age: 38
End: 2023-12-21
Payer: COMMERCIAL

## 2023-12-21 DIAGNOSIS — N18.32 STAGE 3B CHRONIC KIDNEY DISEASE (H): ICD-10-CM

## 2023-12-21 DIAGNOSIS — E11.9 TYPE 2 DIABETES, HBA1C GOAL < 8% (H): ICD-10-CM

## 2023-12-21 RX ORDER — SEMAGLUTIDE 0.68 MG/ML
INJECTION, SOLUTION SUBCUTANEOUS
Qty: 9 ML | Refills: 0 | Status: SHIPPED | OUTPATIENT
Start: 2023-12-21 | End: 2023-12-25

## 2023-12-21 NOTE — TELEPHONE ENCOUNTER
Dr. Tavares/Covering,     [facility] pharmacy tech called stating that patient needs 90 day refill due to insurance and coupon.     Teach stated quantity needs to be 9 mL for 90 day supply. Pt out of medication and will need refilled before holiday weekend.       Thanks,  FRANSISCA Everett  [facility]

## 2023-12-22 ENCOUNTER — TELEPHONE (OUTPATIENT)
Dept: FAMILY MEDICINE | Facility: CLINIC | Age: 38
End: 2023-12-22
Payer: COMMERCIAL

## 2023-12-22 NOTE — TELEPHONE ENCOUNTER
Prior Authorization Retail Medication Request    Medication/Dose: Ozempic (0.25 or 0.5mg/dose)  2mg/3ml Pen Injectors  Diagnosis and ICD code (if different than what is on RX):    New/renewal/insurance change PA/secondary ins. PA:  Previously Tried and Failed:    Rationale:      Insurance   Primary:   Insurance ID:      Secondary (if applicable):  Insurance ID:      Pharmacy Information (if different than what is on RX)  Name:  [facility]  Phone:  [phone]  Fax:[phone]    Chávez:[id number]   Patient's Last Name:Samia  :1985    Courtney Gutierrez CMA  [facility]

## 2023-12-25 RX ORDER — SEMAGLUTIDE 0.68 MG/ML
INJECTION, SOLUTION SUBCUTANEOUS
Qty: 6 ML | Refills: 0 | Status: SHIPPED | OUTPATIENT
Start: 2023-12-25 | End: 2024-04-23

## 2023-12-28 NOTE — TELEPHONE ENCOUNTER
Central Prior Authorization Team   Phone: [phone]      Prior Authorization Not Needed per Insurance    Medication: OZEMPIC (0.25 OR 0.5 MG/DOSE) 2 MG/3ML SC SOPN  Insurance Company: [employer] [place] - Phone [phone] Fax [phone]  Expected CoPay: $    Pharmacy Filling the Rx: [id number] - [place], [place] - [address] AT [facility] [place] [place] [facility] [place]  Pharmacy Notified: No  Patient Notified: No    Attempted to initiated PA via phone with [facility] representativeAdam. Representative stated pharmacy received a paid claim on 12/21/23 9 for 90. No PA needed.

## 2024-03-08 DIAGNOSIS — E83.39 HYPOPHOSPHATEMIA: ICD-10-CM

## 2024-03-11 RX ORDER — SOD PHOS DI, MONO/K PHOS MONO 250 MG
1 TABLET ORAL 2 TIMES DAILY
Qty: 180 TABLET | Refills: 3 | Status: SHIPPED | OUTPATIENT
Start: 2024-03-11

## 2024-03-11 NOTE — TELEPHONE ENCOUNTER
Nephrology Note: Medication Refill Request    Medication Refill Request:     Medication/Dose/Frequency:   Preferred Pharmacy:   Provider:                           SITUATION/BACKROUND:                 Last office visit: 3/20/23        Future office visit: 3/25/24     ASSESSMENT:     Neph Assessments:    Recent Labs:  CBC Results:  Recent Labs   Lab Test 09/09/23  1151 03/21/22  0726 11/29/21  1030   WBC  --   --  9.8   RBC  --   --  5.39   HGB 16.1   < > 16.0   HCT  --   --  48.8   MCV  --   --  91   MCH  --   --  29.7   MCHC  --   --  32.8   RDW  --   --  14.2   PLT  --   --  318    < > = values in this interval not displayed.     Last Renal Panel:  Sodium   Date Value Ref Range Status   09/29/2023 138 135 - 145 mmol/L Final     Comment:     Reference intervals for this test were updated on 09/26/2023 to more accurately reflect our healthy population. There may be differences in the flagging of prior results with similar values performed with this method. Interpretation of those prior results can be made in the context of the updated reference intervals.    03/04/2021 138 133 - 144 mmol/L Final     Potassium   Date Value Ref Range Status   09/29/2023 4.4 3.4 - 5.3 mmol/L Final   04/14/2023 4.0 3.4 - 5.3 mmol/L Final   03/04/2021 4.2 3.4 - 5.3 mmol/L Final     Chloride   Date Value Ref Range Status   09/29/2023 101 98 - 107 mmol/L Final   04/14/2023 110 (H) 94 - 109 mmol/L Final   03/04/2021 107 94 - 109 mmol/L Final     Carbon Dioxide   Date Value Ref Range Status   03/04/2021 25 20 - 32 mmol/L Final     Carbon Dioxide (CO2)   Date Value Ref Range Status   09/29/2023 24 22 - 29 mmol/L Final   04/14/2023 25 20 - 32 mmol/L Final     Anion Gap   Date Value Ref Range Status   09/29/2023 13 7 - 15 mmol/L Final   04/14/2023 3 3 - 14 mmol/L Final   03/04/2021 6 3 - 14 mmol/L Final     Glucose   Date Value Ref Range Status   09/29/2023 130 (H) 70 - 99 mg/dL Final   04/14/2023 139 (H) 70 - 99 mg/dL Final   03/04/2021 98 70  - 99 mg/dL Final     Urea Nitrogen   Date Value Ref Range Status   09/29/2023 22.7 (H) 6.0 - 20.0 mg/dL Final   04/14/2023 30 7 - 30 mg/dL Final   03/04/2021 43 (H) 7 - 30 mg/dL Final     Creatinine   Date Value Ref Range Status   09/29/2023 2.20 (H) 0.67 - 1.17 mg/dL Final   03/04/2021 2.15 (H) 0.66 - 1.25 mg/dL Final     GFR Estimate   Date Value Ref Range Status   09/29/2023 38 (L) >60 mL/min/1.73m2 Final   03/04/2021 38 (L) >60 mL/min/[1.73_m2] Final     Comment:     Non  GFR Calc  Starting 12/18/2018, serum creatinine based estimated GFR (eGFR) will be   calculated using the Chronic Kidney Disease Epidemiology Collaboration   (CKD-EPI) equation.       Calcium   Date Value Ref Range Status   09/29/2023 9.7 8.6 - 10.0 mg/dL Final   03/04/2021 9.6 8.5 - 10.1 mg/dL Final     Phosphorus   Date Value Ref Range Status   09/29/2023 2.4 (L) 2.5 - 4.5 mg/dL Final   09/18/2020 3.2 2.5 - 4.5 mg/dL Final     Albumin   Date Value Ref Range Status   09/29/2023 4.1 3.5 - 5.2 g/dL Final   04/14/2023 3.8 3.4 - 5.0 g/dL Final   09/18/2020 3.4 3.4 - 5.0 g/dL Final       Current Medication List:   Current Outpatient Medications (Antihypertensive, Cardiovascular, Diuretics, Beta blockers, Calcium blockers, Anticoagulants)   Medication Sig    losartan (COZAAR) 25 MG tablet TAKE ONE-HALF TABLET BY MOUTH EVERY DAY     Current Outpatient Medications (Other)   Medication Sig    acetaminophen (TYLENOL) 325 MG tablet Take 325 mg by mouth as needed     albuterol (PROAIR HFA) 108 (90 Base) MCG/ACT inhaler Inhale 2 puffs into the lungs every 4 hours as needed    buPROPion (WELLBUTRIN XL) 300 MG 24 hr tablet TAKE 1 TABLET(300 MG) BY MOUTH DAILY    busPIRone (BUSPAR) 15 MG tablet Take 1 tablet by mouth 2 times daily    calcitRIOL (ROCALTROL) 0.25 MCG capsule Take 1 capsule (0.25 mcg) by mouth three times a week    fluticasone (FLONASE) 50 MCG/ACT nasal spray Spray 1-2 sprays into both nostrils daily as needed     hydrOXYzine  (ATARAX) 25 MG tablet     insulin pen needle (31G X 5 MM) 31G X 5 MM miscellaneous Use 1 pen needles daily or as directed. (Patient not taking: Reported on 3/20/2023)    melatonin 3 MG tablet Take 6 mg by mouth nightly as needed for sleep     phosphorus tablet 250 mg (PHOSPHA 250 NEUTRAL) 250 MG per tablet Take 1 tablet (250 mg) by mouth 2 times daily    semaglutide (OZEMPIC, 0.25 OR 0.5 MG/DOSE,) 2 MG/3ML pen INJECT 0.25MG UNDER THE SKIN EVERY WEEK    traZODone (DESYREL) 50 MG tablet Take 1-2 tablets ( mg) by mouth At Bedtime       Has patient had kidney transplant in the prior 1 year: no.   Has patient been seen the last 12 months: Yes.  Associated labs reviewed for medication: Yes  Last labs on 9/29/23  PLAN:     Medication refilled per protocol: No, routed to Provider to advise.     GERMAINE BARR RN

## 2024-03-14 DIAGNOSIS — N18.30 CKD (CHRONIC KIDNEY DISEASE) STAGE 3, GFR 30-59 ML/MIN (H): Primary | Chronic | ICD-10-CM

## 2024-03-19 ENCOUNTER — LAB (OUTPATIENT)
Dept: LAB | Facility: CLINIC | Age: 39
End: 2024-03-19
Payer: COMMERCIAL

## 2024-03-19 ENCOUNTER — MYC MEDICAL ADVICE (OUTPATIENT)
Dept: NEPHROLOGY | Facility: CLINIC | Age: 39
End: 2024-03-19

## 2024-03-19 DIAGNOSIS — E11.9 TYPE 2 DIABETES, HBA1C GOAL < 8% (H): Primary | ICD-10-CM

## 2024-03-19 DIAGNOSIS — N18.30 CKD (CHRONIC KIDNEY DISEASE) STAGE 3, GFR 30-59 ML/MIN (H): ICD-10-CM

## 2024-03-19 LAB
ALBUMIN MFR UR ELPH: 9.9 MG/DL
ALBUMIN SERPL BCG-MCNC: 4.3 G/DL (ref 3.5–5.2)
ALBUMIN UR-MCNC: NEGATIVE MG/DL
ANION GAP SERPL CALCULATED.3IONS-SCNC: 12 MMOL/L (ref 7–15)
APPEARANCE UR: CLEAR
BILIRUB UR QL STRIP: NEGATIVE
BUN SERPL-MCNC: 19.3 MG/DL (ref 6–20)
CALCIUM SERPL-MCNC: 9.5 MG/DL (ref 8.6–10)
CHLORIDE SERPL-SCNC: 104 MMOL/L (ref 98–107)
CHOLEST SERPL-MCNC: 154 MG/DL
COLOR UR AUTO: YELLOW
CREAT SERPL-MCNC: 2.18 MG/DL (ref 0.67–1.17)
CREAT UR-MCNC: 61.4 MG/DL
CREAT UR-MCNC: 61.4 MG/DL
DEPRECATED HCO3 PLAS-SCNC: 23 MMOL/L (ref 22–29)
EGFRCR SERPLBLD CKD-EPI 2021: 38 ML/MIN/1.73M2
ERYTHROCYTE [DISTWIDTH] IN BLOOD BY AUTOMATED COUNT: 12.5 % (ref 10–15)
FASTING STATUS PATIENT QL REPORTED: NO
GLUCOSE SERPL-MCNC: 160 MG/DL (ref 70–99)
GLUCOSE UR STRIP-MCNC: NEGATIVE MG/DL
HBA1C MFR BLD: 6 % (ref 0–5.6)
HCT VFR BLD AUTO: 47.2 % (ref 40–53)
HDLC SERPL-MCNC: 41 MG/DL
HGB BLD-MCNC: 16 G/DL (ref 13.3–17.7)
HGB UR QL STRIP: NEGATIVE
KETONES UR STRIP-MCNC: NEGATIVE MG/DL
LDLC SERPL CALC-MCNC: 93 MG/DL
LEUKOCYTE ESTERASE UR QL STRIP: NEGATIVE
MCH RBC QN AUTO: 29.7 PG (ref 26.5–33)
MCHC RBC AUTO-ENTMCNC: 33.9 G/DL (ref 31.5–36.5)
MCV RBC AUTO: 88 FL (ref 78–100)
MICROALBUMIN UR-MCNC: 16 MG/L
MICROALBUMIN/CREAT UR: 26.06 MG/G CR (ref 0–17)
NITRATE UR QL: NEGATIVE
NONHDLC SERPL-MCNC: 113 MG/DL
PH UR STRIP: 5.5 [PH] (ref 5–7)
PHOSPHATE SERPL-MCNC: 1.8 MG/DL (ref 2.5–4.5)
PLATELET # BLD AUTO: 289 10E3/UL (ref 150–450)
POTASSIUM SERPL-SCNC: 4.4 MMOL/L (ref 3.4–5.3)
PROT/CREAT 24H UR: 0.16 MG/MG CR (ref 0–0.2)
PTH-INTACT SERPL-MCNC: 66 PG/ML (ref 15–65)
RBC # BLD AUTO: 5.38 10E6/UL (ref 4.4–5.9)
RBC #/AREA URNS AUTO: NORMAL /HPF
SODIUM SERPL-SCNC: 139 MMOL/L (ref 135–145)
SP GR UR STRIP: 1.01 (ref 1–1.03)
TRIGL SERPL-MCNC: 99 MG/DL
UROBILINOGEN UR STRIP-ACNC: 0.2 E.U./DL
WBC # BLD AUTO: 7.4 10E3/UL (ref 4–11)
WBC #/AREA URNS AUTO: NORMAL /HPF

## 2024-03-19 PROCEDURE — 80069 RENAL FUNCTION PANEL: CPT

## 2024-03-19 PROCEDURE — 85027 COMPLETE CBC AUTOMATED: CPT

## 2024-03-19 PROCEDURE — 84156 ASSAY OF PROTEIN URINE: CPT

## 2024-03-19 PROCEDURE — 80061 LIPID PANEL: CPT

## 2024-03-19 PROCEDURE — 81001 URINALYSIS AUTO W/SCOPE: CPT

## 2024-03-19 PROCEDURE — 83970 ASSAY OF PARATHORMONE: CPT

## 2024-03-19 PROCEDURE — 82570 ASSAY OF URINE CREATININE: CPT

## 2024-03-19 PROCEDURE — 36415 COLL VENOUS BLD VENIPUNCTURE: CPT

## 2024-03-19 PROCEDURE — 83036 HEMOGLOBIN GLYCOSYLATED A1C: CPT

## 2024-03-19 PROCEDURE — 82043 UR ALBUMIN QUANTITATIVE: CPT

## 2024-04-21 DIAGNOSIS — E11.9 TYPE 2 DIABETES, HBA1C GOAL < 8% (H): ICD-10-CM

## 2024-04-21 DIAGNOSIS — N18.32 STAGE 3B CHRONIC KIDNEY DISEASE (H): ICD-10-CM

## 2024-04-23 RX ORDER — SEMAGLUTIDE 0.68 MG/ML
0.5 INJECTION, SOLUTION SUBCUTANEOUS
Qty: 6 ML | Refills: 1 | Status: SHIPPED | OUTPATIENT
Start: 2024-04-23 | End: 2024-05-10

## 2024-04-23 NOTE — TELEPHONE ENCOUNTER
"Received paper fax  \"0.25 or 0.5 which one. Had the 0.25 already?\"    Please advise thank you  LS   "

## 2024-04-30 ENCOUNTER — VIRTUAL VISIT (OUTPATIENT)
Dept: NEPHROLOGY | Facility: CLINIC | Age: 39
End: 2024-04-30
Payer: COMMERCIAL

## 2024-04-30 ENCOUNTER — DOCUMENTATION ONLY (OUTPATIENT)
Dept: MULTI SPECIALTY CLINIC | Facility: CLINIC | Age: 39
End: 2024-04-30

## 2024-04-30 VITALS — WEIGHT: 225 LBS | BODY MASS INDEX: 32.21 KG/M2 | HEIGHT: 70 IN

## 2024-04-30 DIAGNOSIS — E83.39 HYPOPHOSPHATEMIA: ICD-10-CM

## 2024-04-30 DIAGNOSIS — N25.81 SECONDARY RENAL HYPERPARATHYROIDISM (H): ICD-10-CM

## 2024-04-30 DIAGNOSIS — N18.31 STAGE 3A CHRONIC KIDNEY DISEASE (H): Primary | ICD-10-CM

## 2024-04-30 DIAGNOSIS — N18.32 STAGE 3B CHRONIC KIDNEY DISEASE (H): Primary | Chronic | ICD-10-CM

## 2024-04-30 DIAGNOSIS — I10 BENIGN ESSENTIAL HYPERTENSION: ICD-10-CM

## 2024-04-30 PROCEDURE — 99214 OFFICE O/P EST MOD 30 MIN: CPT | Mod: 95 | Performed by: INTERNAL MEDICINE

## 2024-04-30 ASSESSMENT — PAIN SCALES - GENERAL: PAINLEVEL: NO PAIN (0)

## 2024-04-30 NOTE — PATIENT INSTRUCTIONS
Additional lab workup to be done in the coming weeks (orders are in place)  Tentative plan to follow-up in 6 months but we will be in touch after seeing the additional test results. Depending on findings, may request a 2nd opinion but will be touch.

## 2024-04-30 NOTE — PROGRESS NOTES
Virtual Visit Details    Type of service:  Video Visit     Originating Location (pt. Location): Home    Distant Location (provider location):  Off-site  Platform used for Video Visit: Debbie    04/30/24     HISTORY OF PRESENT ILLNESS:  Abelino Gracia is a 39 year old male who has had elevated creatinine levels dating back to 2006 according to [facility] records.       History taken from previous notes: His creatinine has ranged anywhere from 1.3-1.7 in his [facility] records.  He was seen by nephrology on one occasion while inpatient at [facility] in 2009 and his kidney injury at that time was felt to be related to long-standing heavy use of NSAIDs.  To briefly summarize risk factors for CKD: heavy NSAID use in the past, previous etoh abuse (sober since 09). Addt hx includes DESHAUN. His serologic workup including C3, C4, ANCA, Anti-GBM, and immunofixations were normal. He is felt to have QURESHI as the cause of his liver abnormalities.               His previous baseline kidney function had been 1.8-2 but has been running slightly higher at 1.8-2.3 in the past year.  His creatinine is 2.08 at this time.  He has been on Topamax for weight loss however without weight loss success.  His phosphorus was noted to be 1.6 at this time and he does report some muscle related weakness.  He is taking 1000 units of vitamin D daily.     03/23/20: no home BP readings at this time (no cuff). Clinic Readings have been less than 130/80. Weight was 252 lbs down to 233 lbs in Dec. Has gained some of that back but still kept most off. No NSAIDs. No swelling concerns. No longer on topamax.      09/22/20: video visit. Since last visit, he underwent kidney biopsy on 7/15/20 which showed:  FINAL DIAGNOSIS:   Native kidney biopsy with glomerulomegaly and nonspecific chronic   interstitial changes   No success with weight loss so far - more stress. We spent time discussing his biopsy findings. Overall doing well - no specific complaints at this time. No  edema.     03/22/21: video visit. GFR 36-39 for the past year. Only minimal proteinuria. On losartan 12.5 mg daily. Feeling more cold in his hands and feet. No constipation/diarrhea. No numbness /tingling. BP less than 130 when he checks it but on/off with checking it at home. He has received first dose of the vaccine. No swelling. Working from home.     3/21/22: in person visit. Creatinine had been 2.1-2.2 but slightly improved most recently at 1.95 now as well as at his Nov lab. UPCR was 0.21 g/g on last check. He had bilateral carpal tunnel repair since last visit (nov and dec).  but was 97 on one occasion in Dec. No edema concerns.     03/20/23: in person visit. GFR was previously in the upper 30s but has been 40-44 since Nov 2021. He has maintained his weight at 221 lbs most recently - had been 258 lbs in Dec 2021. He is not using CPAP mask - never tolerated. He remains away from tobacco as he has for years. He does use some ibuprofen intermittently for migraines - had to come off of propanolol due to lightheadedness on standing.     04/30/24: video visit. He feels pretty good. Not as active as should be. 258 in 2022 - 225 lbs. No lightheadedness. REcently traveled in [place] - trouble with aching muscles when walking. NO PPI therapy. Rarely takes tums. No family hx of dialysis or transplant needs. No swelling. Knee pain - creaking. Hands maybe painful - hx of carpal tunnel surgery. No rash.        Current Outpatient Medications   Medication Sig Dispense Refill    acetaminophen (TYLENOL) 325 MG tablet Take 325 mg by mouth as needed       albuterol (PROAIR HFA) 108 (90 Base) MCG/ACT inhaler Inhale 2 puffs into the lungs every 4 hours as needed 2 Inhaler 3    buPROPion (WELLBUTRIN XL) 300 MG 24 hr tablet TAKE 1 TABLET(300 MG) BY MOUTH DAILY 90 tablet 1    busPIRone (BUSPAR) 15 MG tablet Take 1 tablet by mouth 2 times daily      calcitRIOL (ROCALTROL) 0.25 MCG capsule Take 1 capsule (0.25 mcg) by mouth  "three times a week 36 capsule 4    fluticasone (FLONASE) 50 MCG/ACT nasal spray Spray 1-2 sprays into both nostrils daily as needed  1 Package 11    hydrOXYzine (ATARAX) 25 MG tablet       insulin pen needle (31G X 5 MM) 31G X 5 MM miscellaneous Use 1 pen needles daily or as directed. (Patient not taking: Reported on 3/20/2023) 100 each 2    K Phos Lincoln-Sod Phos Di & Mono (PHOSPHOROUS) 155-852-130 MG TABS TAKE 1 TABLET BY MOUTH 2 TIMES DAILY 180 tablet 3    losartan (COZAAR) 25 MG tablet TAKE ONE-HALF TABLET BY MOUTH EVERY DAY 45 tablet 3    melatonin 3 MG tablet Take 6 mg by mouth nightly as needed for sleep       semaglutide (OZEMPIC, 0.25 OR 0.5 MG/DOSE,) 2 MG/3ML pen Inject 0.5 mg Subcutaneous every 7 days INJECT O.25MG UNDER THE SKIN EVERY WEEK 6 mL 1    traZODone (DESYREL) 50 MG tablet Take 1-2 tablets ( mg) by mouth At Bedtime 180 tablet 4     No current facility-administered medications for this visit.       Exam:  Ht 1.778 m (5' 10\")   Wt 102.1 kg (225 lb)   BMI 32.28 kg/m    GENERAL APPEARANCE: alert and no distress  CV - RRR  Ext - no edema    Results:   No visits with results within 1 Day(s) from this visit.   Latest known visit with results is:   Lab on 03/19/2024   Component Date Value Ref Range Status    WBC Count 03/19/2024 7.4  4.0 - 11.0 10e3/uL Final    RBC Count 03/19/2024 5.38  4.40 - 5.90 10e6/uL Final    Hemoglobin 03/19/2024 16.0  13.3 - 17.7 g/dL Final    Hematocrit 03/19/2024 47.2  40.0 - 53.0 % Final    MCV 03/19/2024 88  78 - 100 fL Final    MCH 03/19/2024 29.7  26.5 - 33.0 pg Final    MCHC 03/19/2024 33.9  31.5 - 36.5 g/dL Final    RDW 03/19/2024 12.5  10.0 - 15.0 % Final    Platelet Count 03/19/2024 289  150 - 450 10e3/uL Final    Parathyroid Hormone Intact 03/19/2024 66 (H)  15 - 65 pg/mL Final    Total Protein Urine mg/dL 03/19/2024 9.9    mg/dL Final    The reference ranges have not been established in urine protein. The results should be integrated into the clinical context " for interpretation.    Total Protein Urine mg/mg Creat 03/19/2024 0.16  0.00 - 0.20 mg/mg Cr Final    Creatinine Urine mg/dL 03/19/2024 61.4  mg/dL Final    The reference ranges have not been established in urine creatinine. The results should be integrated into the clinical context for interpretation.    Sodium 03/19/2024 139  135 - 145 mmol/L Final    Reference intervals for this test were updated on 09/26/2023 to more accurately reflect our healthy population. There may be differences in the flagging of prior results with similar values performed with this method. Interpretation of those prior results can be made in the context of the updated reference intervals.     Potassium 03/19/2024 4.4  3.4 - 5.3 mmol/L Final    Chloride 03/19/2024 104  98 - 107 mmol/L Final    Carbon Dioxide (CO2) 03/19/2024 23  22 - 29 mmol/L Final    Anion Gap 03/19/2024 12  7 - 15 mmol/L Final    Glucose 03/19/2024 160 (H)  70 - 99 mg/dL Final    Urea Nitrogen 03/19/2024 19.3  6.0 - 20.0 mg/dL Final    Creatinine 03/19/2024 2.18 (H)  0.67 - 1.17 mg/dL Final    GFR Estimate 03/19/2024 38 (L)  >60 mL/min/1.73m2 Final    Calcium 03/19/2024 9.5  8.6 - 10.0 mg/dL Final    Albumin 03/19/2024 4.3  3.5 - 5.2 g/dL Final    Phosphorus 03/19/2024 1.8 (L)  2.5 - 4.5 mg/dL Final    Color Urine 03/19/2024 Yellow  Colorless, Straw, Light Yellow, Yellow Final    Appearance Urine 03/19/2024 Clear  Clear Final    Glucose Urine 03/19/2024 Negative  Negative mg/dL Final    Bilirubin Urine 03/19/2024 Negative  Negative Final    Ketones Urine 03/19/2024 Negative  Negative mg/dL Final    Specific Gravity Urine 03/19/2024 1.010  1.003 - 1.035 Final    Blood Urine 03/19/2024 Negative  Negative Final    pH Urine 03/19/2024 5.5  5.0 - 7.0 Final    Protein Albumin Urine 03/19/2024 Negative  Negative mg/dL Final    Urobilinogen Urine 03/19/2024 0.2  0.2, 1.0 E.U./dL Final    Nitrite Urine 03/19/2024 Negative  Negative Final    Leukocyte Esterase Urine 03/19/2024  Negative  Negative Final    Creatinine Urine mg/dL 03/19/2024 61.4  mg/dL Final    The reference ranges have not been established in urine creatinine. The results should be integrated into the clinical context for interpretation.  The reference ranges have not been established in urine creatinine. The results should be integrated into the clinical context for interpretation.    Albumin Urine mg/L 03/19/2024 16.0  mg/L Final    The reference ranges have not been established in urine albumin. The results should be integrated into the clinical context for interpretation.    Albumin Urine mg/g Cr 03/19/2024 26.06 (H)  0.00 - 17.00 mg/g Cr Final    Microalbuminuria is defined as an albumin:creatinine ratio of 17 to 299 for males and 25 to 299 for females. A ratio of albumin:creatinine of 300 or higher is indicative of overt proteinuria.  Due to biologic variability, positive results should be confirmed by a second, first-morning random or 24-hour timed urine specimen. If there is discrepancy, a third specimen is recommended. When 2 out of 3 results are in the microalbuminuria range, this is evidence for incipient nephropathy and warrants increased efforts at glucose control, blood pressure control, and institution of therapy with an angiotensin-converting-enzyme (ACE) inhibitor (if the patient can tolerate it).      Hemoglobin A1C 03/19/2024 6.0 (H)  0.0 - 5.6 % Final    Normal <5.7%   Prediabetes 5.7-6.4%    Diabetes 6.5% or higher     Note: Adopted from ADA consensus guidelines.    Cholesterol 03/19/2024 154  <200 mg/dL Final    Triglycerides 03/19/2024 99  <150 mg/dL Final    Direct Measure HDL 03/19/2024 41  >=40 mg/dL Final    LDL Cholesterol Calculated 03/19/2024 93  <=100 mg/dL Final    Non HDL Cholesterol 03/19/2024 113  <130 mg/dL Final    Patient Fasting > 8hrs? 03/19/2024 No   Final    RBC Urine 03/19/2024 0-2  0-2 /HPF /HPF Final    WBC Urine 03/19/2024 0-5  0-5 /HPF /HPF Final         Lab results were  reviewed and interpreted.       Assessment/Plan:   1.  CKD Stage 3:  Biopsy from July 2020 is consistent with nonspecific interstitial changes and glomerulomegaly. His biggest risk factor for kidney disease is related to long-standing NSAID use. He is now away from NSAIDs. Educated on the benefits of weight loss and blood pressure control. Will monitor routinely for now. On losartan 12.5 for protein lowering effect - pleased to see that his last UPCR was normal in Sept 2022.     I am going to repeat serologic workup in the near future to assure no addt risks for interstitial disease - he is agreeable to this addt lab testing.      2.  QURESHI: Encouraged ongoing weight loss not only to help his kidney function/proteinuria but also avoid addt liver damage related to fatty liver disease.  Also encouraged avoidance of etoh. He has been seen by hepatology.      3. Prediabetes - followed by PCP - last A1C 6%     4. Obesity: has had success with weight loss - weight 221 lbs down from 258 lbs in Dec 2021.     5. 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      Patient Instructions   Additional lab workup to be done in the coming weeks (orders are in place)  Tentative plan to follow-up in 6 months but we will be in touch after seeing the additional test results. Depending on findings, may request a 2nd opinion but will be touch.   828-854 AM video visit via Client Outlook - offsite  Amina Mena DO

## 2024-04-30 NOTE — NURSING NOTE
Is the patient currently in the state of [place]? YES    Visit mode:VIDEO    If the visit is dropped, the patient can be reconnected by: VIDEO VISIT: Send to e-mail at: [email or web address]    Will anyone else be joining the visit? NO  (If patient encounters technical issues they should call [phone]    How would you like to obtain your AVS? MyChart    Are changes needed to the allergy or medication list? No    Are refills needed on medications prescribed by this physician? NO    Reason for visit: GERTRUDE WETZEL

## 2024-05-01 ENCOUNTER — LAB (OUTPATIENT)
Dept: LAB | Facility: CLINIC | Age: 39
End: 2024-05-01
Payer: COMMERCIAL

## 2024-05-01 DIAGNOSIS — N18.32 STAGE 3B CHRONIC KIDNEY DISEASE (H): ICD-10-CM

## 2024-05-01 LAB
ALBUMIN UR-MCNC: NEGATIVE MG/DL
APPEARANCE UR: CLEAR
BILIRUB UR QL STRIP: NEGATIVE
COLOR UR AUTO: YELLOW
GLUCOSE UR STRIP-MCNC: NEGATIVE MG/DL
HGB BLD-MCNC: 15.8 G/DL (ref 13.3–17.7)
HGB UR QL STRIP: NEGATIVE
KETONES UR STRIP-MCNC: NEGATIVE MG/DL
LEUKOCYTE ESTERASE UR QL STRIP: NEGATIVE
NITRATE UR QL: NEGATIVE
PH UR STRIP: 5.5 [PH] (ref 5–7)
RBC #/AREA URNS AUTO: NORMAL /HPF
SP GR UR STRIP: 1.01 (ref 1–1.03)
UROBILINOGEN UR STRIP-ACNC: 0.2 E.U./DL
WBC #/AREA URNS AUTO: NORMAL /HPF

## 2024-05-01 PROCEDURE — 36415 COLL VENOUS BLD VENIPUNCTURE: CPT

## 2024-05-01 PROCEDURE — 84156 ASSAY OF PROTEIN URINE: CPT

## 2024-05-01 PROCEDURE — 81001 URINALYSIS AUTO W/SCOPE: CPT

## 2024-05-01 PROCEDURE — 85018 HEMOGLOBIN: CPT

## 2024-05-01 PROCEDURE — 80069 RENAL FUNCTION PANEL: CPT

## 2024-05-02 LAB
ALBUMIN MFR UR ELPH: 15.1 MG/DL
ALBUMIN SERPL BCG-MCNC: 4.4 G/DL (ref 3.5–5.2)
ANION GAP SERPL CALCULATED.3IONS-SCNC: 14 MMOL/L (ref 7–15)
BUN SERPL-MCNC: 26.2 MG/DL (ref 6–20)
CALCIUM SERPL-MCNC: 9.7 MG/DL (ref 8.6–10)
CHLORIDE SERPL-SCNC: 105 MMOL/L (ref 98–107)
CREAT SERPL-MCNC: 2.17 MG/DL (ref 0.67–1.17)
CREAT UR-MCNC: 105 MG/DL
DEPRECATED HCO3 PLAS-SCNC: 22 MMOL/L (ref 22–29)
EGFRCR SERPLBLD CKD-EPI 2021: 38 ML/MIN/1.73M2
GLUCOSE SERPL-MCNC: 103 MG/DL (ref 70–99)
PHOSPHATE SERPL-MCNC: 3.2 MG/DL (ref 2.5–4.5)
POTASSIUM SERPL-SCNC: 4 MMOL/L (ref 3.4–5.3)
PROT/CREAT 24H UR: 0.14 MG/MG CR (ref 0–0.2)
SODIUM SERPL-SCNC: 141 MMOL/L (ref 135–145)

## 2024-05-07 ENCOUNTER — LAB (OUTPATIENT)
Dept: LAB | Facility: CLINIC | Age: 39
End: 2024-05-07
Payer: COMMERCIAL

## 2024-05-07 DIAGNOSIS — N25.81 SECONDARY RENAL HYPERPARATHYROIDISM (H): ICD-10-CM

## 2024-05-07 DIAGNOSIS — N18.31 STAGE 3A CHRONIC KIDNEY DISEASE (H): ICD-10-CM

## 2024-05-07 LAB
ALBUMIN SERPL BCG-MCNC: 4.4 G/DL (ref 3.5–5.2)
ALP SERPL-CCNC: 121 U/L (ref 40–150)
ALT SERPL W P-5'-P-CCNC: 54 U/L (ref 0–70)
ANION GAP SERPL CALCULATED.3IONS-SCNC: 12 MMOL/L (ref 7–15)
AST SERPL W P-5'-P-CCNC: 32 U/L (ref 0–45)
BILIRUB SERPL-MCNC: 0.5 MG/DL
BUN SERPL-MCNC: 19.7 MG/DL (ref 6–20)
C3 SERPL-MCNC: 121 MG/DL (ref 81–157)
C4 SERPL-MCNC: 26 MG/DL (ref 13–39)
CALCIUM SERPL-MCNC: 9.4 MG/DL (ref 8.6–10)
CHLORIDE SERPL-SCNC: 103 MMOL/L (ref 98–107)
CREAT SERPL-MCNC: 2.08 MG/DL (ref 0.67–1.17)
DEPRECATED HCO3 PLAS-SCNC: 23 MMOL/L (ref 22–29)
EGFRCR SERPLBLD CKD-EPI 2021: 40 ML/MIN/1.73M2
GLUCOSE SERPL-MCNC: 190 MG/DL (ref 70–99)
HBV SURFACE AB SERPL IA-ACNC: 70 M[IU]/ML
HBV SURFACE AB SERPL IA-ACNC: REACTIVE M[IU]/ML
HBV SURFACE AG SERPL QL IA: NONREACTIVE
HIV 1+2 AB+HIV1 P24 AG SERPL QL IA: NONREACTIVE
KAPPA LC FREE SER-MCNC: 3.56 MG/DL (ref 0.33–1.94)
KAPPA LC FREE/LAMBDA FREE SER NEPH: 1.47 {RATIO} (ref 0.26–1.65)
LAMBDA LC FREE SERPL-MCNC: 2.43 MG/DL (ref 0.57–2.63)
MAGNESIUM SERPL-MCNC: 1.8 MG/DL (ref 1.7–2.3)
POTASSIUM SERPL-SCNC: 4.2 MMOL/L (ref 3.4–5.3)
PROT SERPL-MCNC: 7.5 G/DL (ref 6.4–8.3)
SODIUM SERPL-SCNC: 138 MMOL/L (ref 135–145)
TOTAL PROTEIN SERUM FOR ELP: 7.3 G/DL (ref 6.4–8.3)
URATE SERPL-MCNC: 8.4 MG/DL (ref 3.4–7)
VIT D+METAB SERPL-MCNC: 40 NG/ML (ref 20–50)

## 2024-05-07 PROCEDURE — 80053 COMPREHEN METABOLIC PANEL: CPT

## 2024-05-07 PROCEDURE — 84165 PROTEIN E-PHORESIS SERUM: CPT | Performed by: PATHOLOGY

## 2024-05-07 PROCEDURE — 86706 HEP B SURFACE ANTIBODY: CPT

## 2024-05-07 PROCEDURE — 82306 VITAMIN D 25 HYDROXY: CPT

## 2024-05-07 PROCEDURE — 87389 HIV-1 AG W/HIV-1&-2 AB AG IA: CPT

## 2024-05-07 PROCEDURE — 86235 NUCLEAR ANTIGEN ANTIBODY: CPT

## 2024-05-07 PROCEDURE — 86160 COMPLEMENT ANTIGEN: CPT

## 2024-05-07 PROCEDURE — 83735 ASSAY OF MAGNESIUM: CPT

## 2024-05-07 PROCEDURE — 84155 ASSAY OF PROTEIN SERUM: CPT | Mod: 59

## 2024-05-07 PROCEDURE — 86334 IMMUNOFIX E-PHORESIS SERUM: CPT | Performed by: PATHOLOGY

## 2024-05-07 PROCEDURE — 87340 HEPATITIS B SURFACE AG IA: CPT

## 2024-05-07 PROCEDURE — 36415 COLL VENOUS BLD VENIPUNCTURE: CPT

## 2024-05-07 PROCEDURE — 84550 ASSAY OF BLOOD/URIC ACID: CPT

## 2024-05-07 PROCEDURE — 86038 ANTINUCLEAR ANTIBODIES: CPT

## 2024-05-07 PROCEDURE — 83521 IG LIGHT CHAINS FREE EACH: CPT

## 2024-05-08 LAB
ALBUMIN SERPL ELPH-MCNC: 4.3 G/DL (ref 3.7–5.1)
ALPHA1 GLOB SERPL ELPH-MCNC: 0.3 G/DL (ref 0.2–0.4)
ALPHA2 GLOB SERPL ELPH-MCNC: 0.7 G/DL (ref 0.5–0.9)
ANA SER QL IF: NEGATIVE
B-GLOBULIN SERPL ELPH-MCNC: 0.9 G/DL (ref 0.6–1)
ENA SS-A AB SER IA-ACNC: <0.5 U/ML
ENA SS-A AB SER IA-ACNC: NEGATIVE
ENA SS-B IGG SER IA-ACNC: <0.6 U/ML
ENA SS-B IGG SER IA-ACNC: NEGATIVE
GAMMA GLOB SERPL ELPH-MCNC: 1.1 G/DL (ref 0.7–1.6)
M PROTEIN SERPL ELPH-MCNC: 0 G/DL
PATH REPORT.COMMENTS IMP SPEC: NORMAL
PATH REPORT.COMMENTS IMP SPEC: NORMAL
PROT PATTERN SERPL ELPH-IMP: NORMAL
PROT PATTERN SERPL IFE-IMP: NORMAL

## 2024-05-10 ENCOUNTER — VIRTUAL VISIT (OUTPATIENT)
Dept: FAMILY MEDICINE | Facility: CLINIC | Age: 39
End: 2024-05-10
Payer: COMMERCIAL

## 2024-05-10 DIAGNOSIS — F10.21 ALCOHOL DEPENDENCE IN REMISSION (H): ICD-10-CM

## 2024-05-10 DIAGNOSIS — N25.81 SECONDARY RENAL HYPERPARATHYROIDISM (H): ICD-10-CM

## 2024-05-10 DIAGNOSIS — N18.32 STAGE 3B CHRONIC KIDNEY DISEASE (H): ICD-10-CM

## 2024-05-10 DIAGNOSIS — G63 POLYNEUROPATHY ASSOCIATED WITH UNDERLYING DISEASE (H): ICD-10-CM

## 2024-05-10 DIAGNOSIS — F32.5 MAJOR DEPRESSIVE DISORDER IN FULL REMISSION, UNSPECIFIED WHETHER RECURRENT (H): ICD-10-CM

## 2024-05-10 DIAGNOSIS — E66.01 MORBID OBESITY (H): ICD-10-CM

## 2024-05-10 DIAGNOSIS — E11.9 TYPE 2 DIABETES, HBA1C GOAL < 8% (H): Primary | ICD-10-CM

## 2024-05-10 PROCEDURE — 99214 OFFICE O/P EST MOD 30 MIN: CPT | Mod: 95 | Performed by: INTERNAL MEDICINE

## 2024-05-10 PROCEDURE — 96127 BRIEF EMOTIONAL/BEHAV ASSMT: CPT | Mod: 95 | Performed by: INTERNAL MEDICINE

## 2024-05-10 RX ORDER — SEMAGLUTIDE 0.68 MG/ML
0.5 INJECTION, SOLUTION SUBCUTANEOUS
Qty: 9 ML | Refills: 4 | Status: SHIPPED | OUTPATIENT
Start: 2024-05-10

## 2024-05-10 ASSESSMENT — ASTHMA QUESTIONNAIRES
QUESTION_1 LAST FOUR WEEKS HOW MUCH OF THE TIME DID YOUR ASTHMA KEEP YOU FROM GETTING AS MUCH DONE AT WORK, SCHOOL OR AT HOME: NONE OF THE TIME
ACT_TOTALSCORE: 25
ACT_TOTALSCORE: 25
QUESTION_5 LAST FOUR WEEKS HOW WOULD YOU RATE YOUR ASTHMA CONTROL: COMPLETELY CONTROLLED
QUESTION_4 LAST FOUR WEEKS HOW OFTEN HAVE YOU USED YOUR RESCUE INHALER OR NEBULIZER MEDICATION (SUCH AS ALBUTEROL): NOT AT ALL
QUESTION_3 LAST FOUR WEEKS HOW OFTEN DID YOUR ASTHMA SYMPTOMS (WHEEZING, COUGHING, SHORTNESS OF BREATH, CHEST TIGHTNESS OR PAIN) WAKE YOU UP AT NIGHT OR EARLIER THAN USUAL IN THE MORNING: NOT AT ALL
QUESTION_2 LAST FOUR WEEKS HOW OFTEN HAVE YOU HAD SHORTNESS OF BREATH: NOT AT ALL

## 2024-05-10 ASSESSMENT — PATIENT HEALTH QUESTIONNAIRE - PHQ9
SUM OF ALL RESPONSES TO PHQ QUESTIONS 1-9: 7
10. IF YOU CHECKED OFF ANY PROBLEMS, HOW DIFFICULT HAVE THESE PROBLEMS MADE IT FOR YOU TO DO YOUR WORK, TAKE CARE OF THINGS AT HOME, OR GET ALONG WITH OTHER PEOPLE: SOMEWHAT DIFFICULT
SUM OF ALL RESPONSES TO PHQ QUESTIONS 1-9: 7

## 2024-05-10 NOTE — PROGRESS NOTES
"Abiodun is a 39 year old who is being evaluated via a billable video visit.          Assessment & Plan   Problem List Items Addressed This Visit       Alcohol dependence in remission (H) (Chronic)    CKD (chronic kidney disease) stage 3, GFR 30-59 ml/min (H) (Chronic)    Relevant Medications    semaglutide (OZEMPIC, 0.25 OR 0.5 MG/DOSE,) 2 MG/3ML pen    Major depressive disorder in full remission, unspecified whether recurrent (H24)    Morbid obesity (H)    Relevant Medications    semaglutide (OZEMPIC, 0.25 OR 0.5 MG/DOSE,) 2 MG/3ML pen    Polyneuropathy associated with underlying disease (H24)    Secondary renal hyperparathyroidism (H24)    Type 2 diabetes, HbA1C goal < 8% (H) - Primary    Relevant Medications    semaglutide (OZEMPIC, 0.25 OR 0.5 MG/DOSE,) 2 MG/3ML pen      BP     Data Unavailable  5/13/2024    Lab Results   Component Value Date     05/07/2024     04/14/2023    GLC 98 03/04/2021     Lab Results   Component Value Date    A1C 6.0 03/19/2024    A1C 6.0 03/04/2021     Lab Results   Component Value Date    LDL 93 03/19/2024    LDL 96 08/28/2020     Lab Results   Component Value Date    MICROL 16.0 03/19/2024    MICROL 12 02/25/2019     No results found for: \"MICROALBUMIN\"      Reviewed lab results- had total protein normal, elevated kappa free light chain without monoclonal spike or abnormalities in the cbcd  I don't think this is a concern - but will review with nephrology.   Could consider hematology consult if not improving                BMI  Estimated body mass index is 32.28 kg/m  as calculated from the following:    Height as of 4/30/24: 5' 10\" (1.778 m).    Weight as of 4/30/24: 225 lb (102.1 kg).   Weight management plan: Discussed healthy diet and exercise guidelines    Work on weight loss  Regular exercise      Subjective   Abiodun is a 39 year old, presenting for the following health issues:  No chief complaint on file.    HPI             Review of Systems  Constitutional, HEENT, " cardiovascular, pulmonary, gi and gu systems are negative, except as otherwise noted.      Objective           Vitals:  No vitals were obtained today due to virtual visit.    Physical Exam   GENERAL: alert and no distress  EYES: Eyes grossly normal to inspection.  No discharge or erythema, or obvious scleral/conjunctival abnormalities.  RESP: No audible wheeze, cough, or visible cyanosis.    SKIN: Visible skin clear. No significant rash, abnormal pigmentation or lesions.  NEURO: Cranial nerves grossly intact.  Mentation and speech appropriate for age.  PSYCH: Appropriate affect, tone, and pace of words    No results found for any visits on 05/10/24.      Video-Visit Details    Type of service:  Video Visit   Originating Location (pt. Location): Home    Distant Location (provider location):  On-site  Platform used for Video Visit: Debbie  Signed Electronically by: Roberth Tavares MD

## 2024-05-28 DIAGNOSIS — N18.32 STAGE 3B CHRONIC KIDNEY DISEASE (H): Primary | ICD-10-CM

## 2024-05-28 DIAGNOSIS — N25.81 SECONDARY RENAL HYPERPARATHYROIDISM (H): ICD-10-CM

## 2024-06-29 ENCOUNTER — HEALTH MAINTENANCE LETTER (OUTPATIENT)
Age: 39
End: 2024-06-29

## 2024-07-27 ENCOUNTER — MYC REFILL (OUTPATIENT)
Dept: FAMILY MEDICINE | Facility: CLINIC | Age: 39
End: 2024-07-27
Payer: COMMERCIAL

## 2024-07-27 DIAGNOSIS — N18.32 STAGE 3B CHRONIC KIDNEY DISEASE (H): ICD-10-CM

## 2024-07-27 DIAGNOSIS — E11.9 TYPE 2 DIABETES, HBA1C GOAL < 8% (H): ICD-10-CM

## 2024-07-27 DIAGNOSIS — E66.01 MORBID OBESITY (H): Primary | ICD-10-CM

## 2024-07-27 RX ORDER — SEMAGLUTIDE 0.68 MG/ML
0.5 INJECTION, SOLUTION SUBCUTANEOUS
Qty: 9 ML | Refills: 4 | Status: CANCELLED | OUTPATIENT
Start: 2024-07-27

## 2024-08-24 ENCOUNTER — MYC REFILL (OUTPATIENT)
Dept: FAMILY MEDICINE | Facility: CLINIC | Age: 39
End: 2024-08-24
Payer: COMMERCIAL

## 2024-08-24 DIAGNOSIS — E66.01 MORBID OBESITY (H): ICD-10-CM

## 2024-08-24 DIAGNOSIS — E11.9 TYPE 2 DIABETES, HBA1C GOAL < 8% (H): ICD-10-CM

## 2024-08-24 DIAGNOSIS — N18.32 STAGE 3B CHRONIC KIDNEY DISEASE (H): ICD-10-CM

## 2024-09-21 ENCOUNTER — MYC REFILL (OUTPATIENT)
Dept: FAMILY MEDICINE | Facility: CLINIC | Age: 39
End: 2024-09-21
Payer: COMMERCIAL

## 2024-09-21 DIAGNOSIS — E11.9 TYPE 2 DIABETES, HBA1C GOAL < 8% (H): ICD-10-CM

## 2024-09-21 DIAGNOSIS — E66.01 MORBID OBESITY (H): ICD-10-CM

## 2024-09-21 DIAGNOSIS — N18.32 STAGE 3B CHRONIC KIDNEY DISEASE (H): ICD-10-CM

## 2024-09-23 ENCOUNTER — MYC MEDICAL ADVICE (OUTPATIENT)
Dept: FAMILY MEDICINE | Facility: CLINIC | Age: 39
End: 2024-09-23
Payer: COMMERCIAL

## 2024-09-23 DIAGNOSIS — E66.01 MORBID OBESITY (H): ICD-10-CM

## 2024-09-23 DIAGNOSIS — E11.9 TYPE 2 DIABETES, HBA1C GOAL < 8% (H): ICD-10-CM

## 2024-09-23 DIAGNOSIS — N18.32 STAGE 3B CHRONIC KIDNEY DISEASE (H): ICD-10-CM

## 2024-09-25 ENCOUNTER — TELEPHONE (OUTPATIENT)
Dept: FAMILY MEDICINE | Facility: CLINIC | Age: 39
End: 2024-09-25
Payer: COMMERCIAL

## 2024-09-25 NOTE — TELEPHONE ENCOUNTER
Patient Quality Outreach    Patient is due for the following:   Diabetes -  A1C, Eye Exam, Diabetic Follow-Up Visit, and Foot Exam  Depression  -  DAP  Physical Preventive Adult Physical      Topic Date Due    Hepatitis A Vaccine (1 of 2 - Risk 2-dose series) Never done    Hepatitis B Vaccine (1 of 3 - 19+ 3-dose series) Never done    Pneumococcal Vaccine (2 of 2 - PCV) 09/13/2021    Flu Vaccine (1) 09/01/2024    COVID-19 Vaccine (6 - 2024-25 season) 09/01/2024       Next Steps:   Schedule a office visit for DM and Adult Preventative    Type of outreach:    Sent Resident Gifts message.    Next Steps:  Reach out within 90 days via Letter.    Max number of attempts reached: No. Will try again in 90 days if patient still on fail list.    Questions for provider review:    None           Maggi Barnard MA

## 2024-10-11 ENCOUNTER — OFFICE VISIT (OUTPATIENT)
Dept: FAMILY MEDICINE | Facility: CLINIC | Age: 39
End: 2024-10-11
Payer: COMMERCIAL

## 2024-10-11 VITALS
RESPIRATION RATE: 16 BRPM | HEIGHT: 70 IN | BODY MASS INDEX: 29.35 KG/M2 | OXYGEN SATURATION: 96 % | TEMPERATURE: 97.8 F | HEART RATE: 119 BPM | WEIGHT: 205 LBS | SYSTOLIC BLOOD PRESSURE: 106 MMHG | DIASTOLIC BLOOD PRESSURE: 74 MMHG

## 2024-10-11 DIAGNOSIS — N18.32 STAGE 3B CHRONIC KIDNEY DISEASE (H): ICD-10-CM

## 2024-10-11 DIAGNOSIS — E11.9 TYPE 2 DIABETES, HBA1C GOAL < 8% (H): Primary | ICD-10-CM

## 2024-10-11 LAB
ALBUMIN SERPL BCG-MCNC: 4.2 G/DL (ref 3.5–5.2)
ALP SERPL-CCNC: 99 U/L (ref 40–150)
ALT SERPL W P-5'-P-CCNC: 57 U/L (ref 0–70)
ANION GAP SERPL CALCULATED.3IONS-SCNC: 13 MMOL/L (ref 7–15)
AST SERPL W P-5'-P-CCNC: 37 U/L (ref 0–45)
BASOPHILS # BLD AUTO: 0.1 10E3/UL (ref 0–0.2)
BASOPHILS NFR BLD AUTO: 1 %
BILIRUB SERPL-MCNC: 0.3 MG/DL
BUN SERPL-MCNC: 27.9 MG/DL (ref 6–20)
CALCIUM SERPL-MCNC: 9.5 MG/DL (ref 8.8–10.4)
CHLORIDE SERPL-SCNC: 104 MMOL/L (ref 98–107)
CHOLEST SERPL-MCNC: 156 MG/DL
CREAT SERPL-MCNC: 2.61 MG/DL (ref 0.67–1.17)
EGFRCR SERPLBLD CKD-EPI 2021: 31 ML/MIN/1.73M2
EOSINOPHIL # BLD AUTO: 0.8 10E3/UL (ref 0–0.7)
EOSINOPHIL NFR BLD AUTO: 11 %
ERYTHROCYTE [DISTWIDTH] IN BLOOD BY AUTOMATED COUNT: 11.9 % (ref 10–15)
EST. AVERAGE GLUCOSE BLD GHB EST-MCNC: 117 MG/DL
FASTING STATUS PATIENT QL REPORTED: NO
FASTING STATUS PATIENT QL REPORTED: NO
GLUCOSE SERPL-MCNC: 138 MG/DL (ref 70–99)
HBA1C MFR BLD: 5.7 % (ref 0–5.6)
HCO3 SERPL-SCNC: 24 MMOL/L (ref 22–29)
HCT VFR BLD AUTO: 44.7 % (ref 40–53)
HDLC SERPL-MCNC: 39 MG/DL
HGB BLD-MCNC: 15.4 G/DL (ref 13.3–17.7)
IMM GRANULOCYTES # BLD: 0 10E3/UL
IMM GRANULOCYTES NFR BLD: 0 %
LDLC SERPL CALC-MCNC: 89 MG/DL
LYMPHOCYTES # BLD AUTO: 1.6 10E3/UL (ref 0.8–5.3)
LYMPHOCYTES NFR BLD AUTO: 21 %
MCH RBC QN AUTO: 29.7 PG (ref 26.5–33)
MCHC RBC AUTO-ENTMCNC: 34.5 G/DL (ref 31.5–36.5)
MCV RBC AUTO: 86 FL (ref 78–100)
MONOCYTES # BLD AUTO: 0.5 10E3/UL (ref 0–1.3)
MONOCYTES NFR BLD AUTO: 7 %
NEUTROPHILS # BLD AUTO: 4.5 10E3/UL (ref 1.6–8.3)
NEUTROPHILS NFR BLD AUTO: 60 %
NONHDLC SERPL-MCNC: 117 MG/DL
PLATELET # BLD AUTO: 275 10E3/UL (ref 150–450)
POTASSIUM SERPL-SCNC: 4.4 MMOL/L (ref 3.4–5.3)
PROT SERPL-MCNC: 7.1 G/DL (ref 6.4–8.3)
RBC # BLD AUTO: 5.18 10E6/UL (ref 4.4–5.9)
SODIUM SERPL-SCNC: 141 MMOL/L (ref 135–145)
TRIGL SERPL-MCNC: 139 MG/DL
WBC # BLD AUTO: 7.6 10E3/UL (ref 4–11)

## 2024-10-11 PROCEDURE — 91320 SARSCV2 VAC 30MCG TRS-SUC IM: CPT | Performed by: INTERNAL MEDICINE

## 2024-10-11 PROCEDURE — 90480 ADMN SARSCOV2 VAC 1/ONLY CMP: CPT | Performed by: INTERNAL MEDICINE

## 2024-10-11 PROCEDURE — 80053 COMPREHEN METABOLIC PANEL: CPT | Performed by: INTERNAL MEDICINE

## 2024-10-11 PROCEDURE — 80061 LIPID PANEL: CPT | Performed by: INTERNAL MEDICINE

## 2024-10-11 PROCEDURE — 36415 COLL VENOUS BLD VENIPUNCTURE: CPT | Performed by: INTERNAL MEDICINE

## 2024-10-11 PROCEDURE — 85025 COMPLETE CBC W/AUTO DIFF WBC: CPT | Performed by: INTERNAL MEDICINE

## 2024-10-11 PROCEDURE — 83036 HEMOGLOBIN GLYCOSYLATED A1C: CPT | Performed by: INTERNAL MEDICINE

## 2024-10-11 PROCEDURE — 90471 IMMUNIZATION ADMIN: CPT | Performed by: INTERNAL MEDICINE

## 2024-10-11 PROCEDURE — 90472 IMMUNIZATION ADMIN EACH ADD: CPT | Performed by: INTERNAL MEDICINE

## 2024-10-11 PROCEDURE — 90677 PCV20 VACCINE IM: CPT | Performed by: INTERNAL MEDICINE

## 2024-10-11 PROCEDURE — 99214 OFFICE O/P EST MOD 30 MIN: CPT | Mod: 25 | Performed by: INTERNAL MEDICINE

## 2024-10-11 PROCEDURE — 90656 IIV3 VACC NO PRSV 0.5 ML IM: CPT | Performed by: INTERNAL MEDICINE

## 2024-10-11 ASSESSMENT — PATIENT HEALTH QUESTIONNAIRE - PHQ9
SUM OF ALL RESPONSES TO PHQ QUESTIONS 1-9: 4
10. IF YOU CHECKED OFF ANY PROBLEMS, HOW DIFFICULT HAVE THESE PROBLEMS MADE IT FOR YOU TO DO YOUR WORK, TAKE CARE OF THINGS AT HOME, OR GET ALONG WITH OTHER PEOPLE: NOT DIFFICULT AT ALL
SUM OF ALL RESPONSES TO PHQ QUESTIONS 1-9: 4

## 2024-10-11 ASSESSMENT — ASTHMA QUESTIONNAIRES: ACT_TOTALSCORE: 25

## 2024-10-11 NOTE — PROGRESS NOTES
"  Assessment & Plan   Problem List Items Addressed This Visit       CKD (chronic kidney disease) stage 3, GFR 30-59 ml/min (H) (Chronic)    Relevant Orders    Comprehensive metabolic panel (BMP + Alb, Alk Phos, ALT, AST, Total. Bili, TP)    CBC with platelets and differential (Completed)    Type 2 diabetes, HbA1C goal < 8% (H) - Primary    Relevant Orders    OPTOMETRY REFERRAL    HEMOGLOBIN A1C (Completed)    Lipid panel reflex to direct LDL Non-fasting      BP     106/74  10/11/2024    Lab Results   Component Value Date     05/07/2024     04/14/2023    GLC 98 03/04/2021     Lab Results   Component Value Date    A1C 5.7 10/11/2024    A1C 6.0 03/04/2021     Lab Results   Component Value Date    LDL 93 03/19/2024    LDL 96 08/28/2020     Lab Results   Component Value Date    MICROL 16.0 03/19/2024    MICROL 12 02/25/2019     No results found for: \"MICROALBUMIN\"       Wondering if it is time to stop losartan as patient get orthostatic   Drinks lots of fluids   Has lost a lot of weight   Will review with nephrology   Otherwise marked improvement in diabetes and other risk factors   Reviewed status of ozempic andhow higher dose not needed at this time and hard to know when to stop the treatment         Work on weight loss  Regular exercise      Subjective   Abiodun is a 39 year old, presenting for the following health issues:  Diabetes        10/11/2024    11:09 AM   Additional Questions   Roomed by roxanna     History of Present Illness       CKD: He uses over the counter pain medication, including Tylenol, a few times a month.    Diabetes:   He presents for follow up of diabetes.    He is not checking blood glucose.         He has no concerns regarding his diabetes at this time.   He is not experiencing numbness or burning in feet, excessive thirst, blurry vision, weight changes or redness, sores or blisters on feet. The patient has not had a diabetic eye exam in the last 12 months.          He eats 2-3 " "servings of fruits and vegetables daily.He consumes 0 sweetened beverage(s) daily.He exercises with enough effort to increase his heart rate 20 to 29 minutes per day.  He exercises with enough effort to increase his heart rate 3 or less days per week.   He is taking medications regularly.                 Review of Systems  Constitutional, HEENT, cardiovascular, pulmonary, gi and gu systems are negative, except as otherwise noted.      Objective    /74   Pulse 119   Temp 97.8  F (36.6  C) (Temporal)   Resp 16   Ht 1.778 m (5' 10\")   Wt 93 kg (205 lb)   SpO2 96%   BMI 29.41 kg/m    Body mass index is 29.41 kg/m .  Physical Exam   GENERAL: alert and no distress  EYES: Eyes grossly normal to inspection, PERRL and conjunctivae and sclerae normal  HENT: ear canals and TM's normal, nose and mouth without ulcers or lesions  NECK: no adenopathy, no asymmetry, masses, or scars  RESP: lungs clear to auscultation - no rales, rhonchi or wheezes  CV: regular rate and rhythm, normal S1 S2, no S3 or S4, no murmur, click or rub, no peripheral edema  ABDOMEN: soft, nontender, no hepatosplenomegaly, no masses and bowel sounds normal  MS: no gross musculoskeletal defects noted, no edema  SKIN: no suspicious lesions or rashes  NEURO: Normal strength and tone, mentation intact and speech normal  BACK: no CVA tenderness, no paralumbar tenderness  PSYCH: mentation appears normal, affect normal/bright  LYMPH: no cervical, supraclavicular, axillary, or inguinal adenopathy    Results for orders placed or performed in visit on 10/11/24   HEMOGLOBIN A1C     Status: Abnormal   Result Value Ref Range    Estimated Average Glucose 117 (H) <117 mg/dL    Hemoglobin A1C 5.7 (H) 0.0 - 5.6 %   CBC with platelets and differential     Status: Abnormal   Result Value Ref Range    WBC Count 7.6 4.0 - 11.0 10e3/uL    RBC Count 5.18 4.40 - 5.90 10e6/uL    Hemoglobin 15.4 13.3 - 17.7 g/dL    Hematocrit 44.7 40.0 - 53.0 %    MCV 86 78 - 100 fL    " MCH 29.7 26.5 - 33.0 pg    MCHC 34.5 31.5 - 36.5 g/dL    RDW 11.9 10.0 - 15.0 %    Platelet Count 275 150 - 450 10e3/uL    % Neutrophils 60 %    % Lymphocytes 21 %    % Monocytes 7 %    % Eosinophils 11 %    % Basophils 1 %    % Immature Granulocytes 0 %    Absolute Neutrophils 4.5 1.6 - 8.3 10e3/uL    Absolute Lymphocytes 1.6 0.8 - 5.3 10e3/uL    Absolute Monocytes 0.5 0.0 - 1.3 10e3/uL    Absolute Eosinophils 0.8 (H) 0.0 - 0.7 10e3/uL    Absolute Basophils 0.1 0.0 - 0.2 10e3/uL    Absolute Immature Granulocytes 0.0 <=0.4 10e3/uL   CBC with platelets and differential     Status: Abnormal    Narrative    The following orders were created for panel order CBC with platelets and differential.  Procedure                               Abnormality         Status                     ---------                               -----------         ------                     CBC with platelets and d...[[id number]]  Abnormal            Final result                 Please view results for these tests on the individual orders.           Signed Electronically by: Roberth Tavares MD

## 2024-10-11 NOTE — Clinical Note
Ana Mena Abiodun lost a lot of weight on ozempic and is now orthostatic in the evenings.  Wondering about risk/benefit of stopping losartan 12.5 mg vs. Risk of ongoing kidney injury.  Thanks

## 2024-11-27 DIAGNOSIS — N18.30 CKD (CHRONIC KIDNEY DISEASE) STAGE 3, GFR 30-59 ML/MIN (H): Primary | Chronic | ICD-10-CM

## 2024-12-03 ENCOUNTER — OFFICE VISIT (OUTPATIENT)
Dept: NEPHROLOGY | Facility: CLINIC | Age: 39
End: 2024-12-03
Payer: COMMERCIAL

## 2024-12-03 ENCOUNTER — LAB (OUTPATIENT)
Dept: LAB | Facility: CLINIC | Age: 39
End: 2024-12-03
Payer: COMMERCIAL

## 2024-12-03 VITALS
OXYGEN SATURATION: 90 % | SYSTOLIC BLOOD PRESSURE: 116 MMHG | DIASTOLIC BLOOD PRESSURE: 80 MMHG | HEART RATE: 90 BPM | WEIGHT: 199 LBS | BODY MASS INDEX: 28.55 KG/M2

## 2024-12-03 DIAGNOSIS — E83.39 HYPOPHOSPHATEMIA: Primary | ICD-10-CM

## 2024-12-03 DIAGNOSIS — N18.32 CKD STAGE 3B, GFR 30-44 ML/MIN (H): ICD-10-CM

## 2024-12-03 DIAGNOSIS — E83.39 HYPOPHOSPHATEMIA: ICD-10-CM

## 2024-12-03 DIAGNOSIS — N18.30 CKD (CHRONIC KIDNEY DISEASE) STAGE 3, GFR 30-59 ML/MIN (H): ICD-10-CM

## 2024-12-03 LAB
ALBUMIN MFR UR ELPH: 36.7 MG/DL
ALBUMIN SERPL BCG-MCNC: 4.5 G/DL (ref 3.5–5.2)
ALBUMIN UR-MCNC: 30 MG/DL
ANION GAP SERPL CALCULATED.3IONS-SCNC: 12 MMOL/L (ref 7–15)
APPEARANCE UR: CLEAR
BACTERIA #/AREA URNS HPF: ABNORMAL /HPF
BILIRUB UR QL STRIP: NEGATIVE
BUN SERPL-MCNC: 16.2 MG/DL (ref 6–20)
CALCIUM SERPL-MCNC: 9.5 MG/DL (ref 8.8–10.4)
CHLORIDE SERPL-SCNC: 103 MMOL/L (ref 98–107)
COLOR UR AUTO: YELLOW
CREAT SERPL-MCNC: 2.19 MG/DL (ref 0.67–1.17)
CREAT UR-MCNC: 191 MG/DL
CREAT UR-MCNC: 197 MG/DL
EGFRCR SERPLBLD CKD-EPI 2021: 38 ML/MIN/1.73M2
ERYTHROCYTE [DISTWIDTH] IN BLOOD BY AUTOMATED COUNT: 12.7 % (ref 10–15)
GLUCOSE SERPL-MCNC: 122 MG/DL (ref 70–99)
GLUCOSE UR STRIP-MCNC: NEGATIVE MG/DL
HCO3 SERPL-SCNC: 26 MMOL/L (ref 22–29)
HCT VFR BLD AUTO: 47 % (ref 40–53)
HGB BLD-MCNC: 16.1 G/DL (ref 13.3–17.7)
HGB UR QL STRIP: NEGATIVE
KETONES UR STRIP-MCNC: NEGATIVE MG/DL
LEUKOCYTE ESTERASE UR QL STRIP: NEGATIVE
MCH RBC QN AUTO: 29.7 PG (ref 26.5–33)
MCHC RBC AUTO-ENTMCNC: 34.3 G/DL (ref 31.5–36.5)
MCV RBC AUTO: 87 FL (ref 78–100)
MUCOUS THREADS #/AREA URNS LPF: PRESENT /LPF
NITRATE UR QL: NEGATIVE
PH UR STRIP: 5.5 [PH] (ref 5–7)
PHOSPHATE 24H UR-MCNC: 0.39 G/G CR
PHOSPHATE SERPL-MCNC: 2 MG/DL (ref 2.5–4.5)
PHOSPHATE UR-MCNC: 75.1 MG/DL (ref 40–136)
PLATELET # BLD AUTO: 275 10E3/UL (ref 150–450)
POTASSIUM SERPL-SCNC: 4.1 MMOL/L (ref 3.4–5.3)
PROT/CREAT 24H UR: 0.19 MG/MG CR (ref 0–0.2)
PTH-INTACT SERPL-MCNC: 77 PG/ML (ref 15–65)
RBC # BLD AUTO: 5.42 10E6/UL (ref 4.4–5.9)
RBC #/AREA URNS AUTO: ABNORMAL /HPF
SKIP: ABNORMAL
SODIUM SERPL-SCNC: 141 MMOL/L (ref 135–145)
SP GR UR STRIP: 1.01 (ref 1–1.03)
SQUAMOUS #/AREA URNS AUTO: ABNORMAL /LPF
TRANS CELLS #/AREA URNS HPF: ABNORMAL /HPF
UROBILINOGEN UR STRIP-MCNC: NORMAL MG/DL
VIT D+METAB SERPL-MCNC: 42 NG/ML (ref 20–50)
WBC # BLD AUTO: 9.4 10E3/UL (ref 4–11)
WBC #/AREA URNS AUTO: ABNORMAL /HPF

## 2024-12-03 PROCEDURE — 36415 COLL VENOUS BLD VENIPUNCTURE: CPT

## 2024-12-03 PROCEDURE — 84156 ASSAY OF PROTEIN URINE: CPT

## 2024-12-03 PROCEDURE — 99215 OFFICE O/P EST HI 40 MIN: CPT | Performed by: INTERNAL MEDICINE

## 2024-12-03 PROCEDURE — 83970 ASSAY OF PARATHORMONE: CPT

## 2024-12-03 PROCEDURE — 85027 COMPLETE CBC AUTOMATED: CPT

## 2024-12-03 PROCEDURE — 99417 PROLNG OP E/M EACH 15 MIN: CPT | Performed by: INTERNAL MEDICINE

## 2024-12-03 PROCEDURE — 80069 RENAL FUNCTION PANEL: CPT

## 2024-12-03 PROCEDURE — 81001 URINALYSIS AUTO W/SCOPE: CPT

## 2024-12-03 PROCEDURE — 84105 ASSAY OF URINE PHOSPHORUS: CPT

## 2024-12-03 PROCEDURE — 82306 VITAMIN D 25 HYDROXY: CPT

## 2024-12-03 RX ORDER — BUSPIRONE HYDROCHLORIDE 10 MG/1
10 TABLET ORAL 2 TIMES DAILY
COMMUNITY
Start: 2023-06-01

## 2024-12-03 RX ORDER — FAMOTIDINE 20 MG
TABLET ORAL DAILY
COMMUNITY

## 2024-12-03 ASSESSMENT — PAIN SCALES - GENERAL: PAINLEVEL_OUTOF10: NO PAIN (0)

## 2024-12-03 NOTE — NURSING NOTE
Abelino Gracia's goals for this visit include:   Chief Complaint   Patient presents with    RECHECK     8mo recheck CKD       He requests these members of his care team be copied on today's visit information: no    PCP: Roberth Tavares    Referring Provider:  Amina Mena DO  [facility]  [id number] [place] [address]    /80 (BP Location: Left arm, Patient Position: Sitting, Cuff Size: Adult Large)   Pulse 90   Wt 90.3 kg (199 lb)   SpO2 90%   BMI 28.55 kg/m      Do you need any medication refills at today's visit? No    Irene Ramirez LPN

## 2024-12-03 NOTE — PATIENT INSTRUCTIONS
It was a pleasure taking care of you today.  I've included a brief summary of our discussion and care plan from today's visit below.  Please review this information with your primary care provider.     My recommendations are summarized as follows:  -Will order a 24 hrs urine collection for calcium   -referral to genetic testing    Who do I call with any questions after my visit?  Please be in touch if there are any further questions that arise following today's visit.  There are multiple ways to contact your nephrology care team.       During business hours, you may reach your Nephrology Care Team or schedule or reschedule an appointment or lab at [phone].       If you need to schedule imaging, please call [phone].   To schedule a COVID test, please call [phone].     You can always send a secure message through CDEL. CDEL messages are answered by your nurse or doctor typically within 24-48 hours. Please allow extra time on weekends and holidays.       For urgent/emergent questions after business hours, you may reach the on-call Nephrology Fellow by contacting the [facility]  at [phone].     How will I get the results of any tests ordered?    You will receive all of your results.  If you have signed up for CDEL, any tests ordered at your visit will be available to you once resulted on CDEL. Typically the physician reviews them and may or may not make further recommendations. If there are urgent results that require a change in your care plan, your physician or nurse will call you to discuss the next steps. If you are not on [facility], a letter may be generated and mailed to you with your results.

## 2024-12-03 NOTE — PROGRESS NOTES
Referring physician:  Dr. Martita Eric    Patient ID:   Abelino Gracia is a 39 year old male who has had elevated creatinine levels dating back to 2006. He is a patient of Dr. Eric and he is here for a second opinion regarding his CKD.    HPI: His CKD dates back to at least 2010.  He has a creatinine around 1.3-1.7 mg/dL according to his [facility] records.  He was seen by nephrology on one occasion while at [facility] in 2019 and his kidney injury at that time was thought to be related to longstanding history of NSAIDs intake.  Indeed the patient reports that during that time, he was a heavy drinker and he had significant migraine attacks.  He would use NSAID almost on a daily basis and for a long period off time.  He was also on Topamax.  His serologic workup including C3, C4, ANCA, Anti-GBM, and immunofixations were normal.  He did have some mild proteinuria and he was taking losartan 12.5 mg daily however it caused dizziness so it was stopped by his PCP.     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  He also have MASH.  He also has a longstanding history of intermittently low phosphorus and is currently taking phosphate supplements for that.  He is also taking 1000 units of vitamin D daily.  He reports chronic fatigue. No longstanding history of chronic PPI use.  He denies any herbal medications.    He has also diabetes and he has been on Januvia in the past.  He is currently on Ozempic and has lost significant amount of weight.    He had a kidney biopsy 2021 which showed glomerulomegaly and nonspecific chronic interstitial changes.  His IF was negative.  His electromicroscopy is significant for nonspecific thickening of his basement membrane.    He denies any family history of kidney disease.  His grandmother might have needed dialysis when she was sick in the hospital later in her life.  He denies any personal or family history of hearing loss at an early age.  He denies any  personal history of visual abnormalities or impairment.    He does report a history of possible gout or hyperuricemia and he has been maintained on allopurinol.  Nonetheless he denies any family history of gout in his family.  He denies any personal or family history of kidney stones.  He denies any history of IV drug abuse.  He denies any history of viral hepatitis.  As a child, he was rather healthy.  No major hospitalization.  As far as he can tell, he was born full-term and that he had no  complications.    He also has a history of obesity.  He was recently started on Ozempic and he lost significant amounts of weight.    Social history: He is single but he has a partner.  He has no kids.  He currently works as a owner of a pre-authorization company.  He stopped his drinking back in .  He does not smoke.    Current Outpatient Medications   Medication Sig Dispense Refill    acetaminophen (TYLENOL) 325 MG tablet Take 325 mg by mouth as needed       buPROPion (WELLBUTRIN XL) 300 MG 24 hr tablet TAKE 1 TABLET(300 MG) BY MOUTH DAILY 90 tablet 1    busPIRone (BUSPAR) 10 MG tablet Take 10 mg by mouth 2 times daily. Patient's comments:   30mg am, 20mg pm      fluticasone (FLONASE) 50 MCG/ACT nasal spray Spray 1-2 sprays into both nostrils daily as needed  1 Package 11    hydrOXYzine (ATARAX) 25 MG tablet every 6 hours as needed.      K Phos [place]-Sod Phos Di & Mono (PHOSPHOROUS) [id number] MG TABS TAKE 1 TABLET BY MOUTH 2 TIMES DAILY 180 tablet 3    Semaglutide, 1 MG/DOSE, (OZEMPIC) 4 MG/3ML pen Inject 1 mg subcutaneously every 7 days. 9 mL 0    traZODone (DESYREL) 50 MG tablet Take 1-2 tablets ( mg) by mouth At Bedtime (Patient taking differently: Take 100 mg by mouth at bedtime.) 180 tablet 4    Vitamin D, Cholecalciferol, 25 MCG (1000 UT) CAPS daily.      albuterol (PROAIR HFA) 108 (90 Base) MCG/ACT inhaler Inhale 2 puffs into the lungs every 4 hours as needed (Patient not taking: Reported on  12/3/2024) 2 Inhaler 3    busPIRone (BUSPAR) 15 MG tablet Take 1 tablet by mouth 2 times daily (Patient not taking: Reported on 12/3/2024)      insulin pen needle (31G X 5 MM) 31G X 5 MM miscellaneous Use 1 pen needles daily or as directed. (Patient not taking: Reported on 12/3/2024) 100 each 2    losartan (COZAAR) 25 MG tablet TAKE ONE-HALF TABLET BY MOUTH EVERY DAY (Patient not taking: Reported on 12/3/2024) 45 tablet 3    melatonin 3 MG tablet Take 6 mg by mouth nightly as needed for sleep  (Patient not taking: Reported on 12/3/2024)       No current facility-administered medications for this visit.       Exam:  /80 (BP Location: Left arm, Patient Position: Sitting, Cuff Size: Adult Large)   Pulse 90   Wt 90.3 kg (199 lb)   SpO2 90%   BMI 28.55 kg/m    BP Readings from Last 6 Encounters:   12/03/24 116/80   10/11/24 106/74   09/01/23 119/83   03/20/23 125/78   11/18/22 108/70   09/22/22 110/82     Wt Readings from Last 4 Encounters:   12/03/24 90.3 kg (199 lb)   10/11/24 93 kg (205 lb)   04/30/24 102.1 kg (225 lb)   09/01/23 104.8 kg (231 lb)     Physical Exam:  General : Alert, cooperative, comfortable  Skin: Skin color, texture and turgor normal, no rashes, no lesions   Lymph Nodes: No obvious adenopathy, no swelling   Eyes: No scleral icterus, equal pupils  HENT: no traumatic injury to the head or face, no gross abnormalities  Lungs: Normal respiratory effort, bilateral symmetrical breath sounds, No added sounds  Heart: Regular rate and rhythm, No murmurs, rub or gallop  Abdomen: Normal bowel sounds, no tenderness, no organomegaly  Musculoskeletal: No obvious abnormalities  Neurologic: Grossly intact  No lower extremity edema    Results: Reviewed in details with the patient    Assessment/Plan:   1.  CKD Stage 3:    His CKD is rather longstanding with a creatinine of 1.5 mg/dl  in 2010.  Since then, his creatinine has been progressively slowly worsening.  He does have some associated very mild  proteinuria which is not currently present during his most recent labs.  There are 2 major risk factors for CKD in his case: 1 is his longstanding history of nonsteroidal anti-inflammatory intake and  2 is his obesity with secondary hyper filtration.  Since his diagnosis, he has been very cautious with NSAIDs and he has lost significant weight after starting Ozempic.  He does have a history of gout however he denies any family history of gout or kidney stones or kidney disease that might be suggestive of autosomal dominant tubulointerstitial disease.  His family history besides is also not significant for any particular kidney disease.  His biopsy did show some nonspecific interstitial changes as well as the glomerularomegaly.  His glomerularomegaly can be explained by obesity at the time and hyperfiltration as well as his diabetes.  His nonspecific interstitial changes might be explained by his nonsteroidal anti-inflammatory drugs.  Of note he does have hypophosphatemia but no other indicators of proximal tubulopathy.  His biopsy also shows a thickened tubular basement membrane which is rather nonspecific but can be an indication of collagen type IV disease though he does not have any significant proteinuria or hematuria.  He reports that he has done some genetic testing but looking at his labs it looks like rather nonspecific.  I discussed with him today that I would not see any further indication to repeat his kidney biopsy at this point as it will not change our management.  If a diagnosis is warrented then I would refer him to the genetic clinic for genetic testing that is kidney specific.    -Currently he does not have any proteinuria which is good and his creatinine is rather stable around 2 mg/dL.  He has been on a small dose losartan in the past but since his blood pressure is okay and he has developed dizziness from it then I think it is okay to hold on that for now  - He has lost significant weight and  that is a very important step in halting the progression of chronic kidney disease and can explain the reduction of his proteinuria.  He was instructed to continue to be at his ideal body weight.  -He might be a candidate for an SGLT2 inhibitor     2.  MASH: Encouraged to maintain his ideal body weight not only to help his kidney function/proteinuria but also avoid additional liver damage related to fatty liver disease.  Also encouraged avoidance of etoh.  He has been sober since 2009 and he has been seen by hepatology.      3. Diabetes mellitus - followed by PCP - last A1C 5.7 %     4. Obesity: has had success with weight loss -his current BMI is at 28.5 kg/m     5. Hypophosphatemia: unclear etiology.  His calcium is rather normal.  His PTH is slightly elevated and will do a 24-hour urine collection to rule out primary normocalcemic hyperparathyroidism, although the suspicion is rather very low.  Also, will check fractional excretion of phosphorus.  He does not have any other indicators that are suggestive of proximal tubularo bruce       The total time of this encounter amounted to 60 minutes on the day of the encounter 12/3/2024. This time included face to face time spent with the patient, preparatory work and chart review, ordering tests, and performing post visit documentation.    The longitudinal plan of care for this patient was addressed during this visit. Due to the added complexity in care, I will continue to support the patient with subsequent management of this condition(s) and with the ongoing continuity of care of this condition(s).     *This dictation was prepared in part using Dragon recognition software.  As a result errors may occur. When identified these transcription errors have been corrected.  While every attempt is made to correct errors during dictation, errors may still exist.     Christine Navarro MD   [facility]   Department of Medicine   Division of Renal  Disease and Hypertension

## 2024-12-09 DIAGNOSIS — E11.9 TYPE 2 DIABETES, HBA1C GOAL < 8% (H): ICD-10-CM

## 2024-12-09 DIAGNOSIS — E66.01 MORBID OBESITY (H): ICD-10-CM

## 2024-12-09 DIAGNOSIS — N18.32 STAGE 3B CHRONIC KIDNEY DISEASE (H): ICD-10-CM

## 2024-12-09 RX ORDER — SEMAGLUTIDE 1.34 MG/ML
1 INJECTION, SOLUTION SUBCUTANEOUS
Qty: 9 ML | Refills: 0 | Status: SHIPPED | OUTPATIENT
Start: 2024-12-09

## 2025-02-08 ENCOUNTER — HEALTH MAINTENANCE LETTER (OUTPATIENT)
Age: 40
End: 2025-02-08

## 2025-03-05 ENCOUNTER — MYC REFILL (OUTPATIENT)
Dept: FAMILY MEDICINE | Facility: CLINIC | Age: 40
End: 2025-03-05
Payer: COMMERCIAL

## 2025-03-05 DIAGNOSIS — N18.32 STAGE 3B CHRONIC KIDNEY DISEASE (H): ICD-10-CM

## 2025-03-05 DIAGNOSIS — E66.01 MORBID OBESITY (H): ICD-10-CM

## 2025-03-05 DIAGNOSIS — E11.9 TYPE 2 DIABETES, HBA1C GOAL < 8% (H): ICD-10-CM

## 2025-03-06 RX ORDER — SEMAGLUTIDE 1.34 MG/ML
1 INJECTION, SOLUTION SUBCUTANEOUS
Qty: 9 ML | Refills: 0 | Status: SHIPPED | OUTPATIENT
Start: 2025-03-06

## 2025-04-06 ASSESSMENT — ASTHMA QUESTIONNAIRES
QUESTION_2 LAST FOUR WEEKS HOW OFTEN HAVE YOU HAD SHORTNESS OF BREATH: NOT AT ALL
ACT_TOTALSCORE: 25
QUESTION_4 LAST FOUR WEEKS HOW OFTEN HAVE YOU USED YOUR RESCUE INHALER OR NEBULIZER MEDICATION (SUCH AS ALBUTEROL): NOT AT ALL
QUESTION_1 LAST FOUR WEEKS HOW MUCH OF THE TIME DID YOUR ASTHMA KEEP YOU FROM GETTING AS MUCH DONE AT WORK, SCHOOL OR AT HOME: NONE OF THE TIME
QUESTION_3 LAST FOUR WEEKS HOW OFTEN DID YOUR ASTHMA SYMPTOMS (WHEEZING, COUGHING, SHORTNESS OF BREATH, CHEST TIGHTNESS OR PAIN) WAKE YOU UP AT NIGHT OR EARLIER THAN USUAL IN THE MORNING: NOT AT ALL
QUESTION_5 LAST FOUR WEEKS HOW WOULD YOU RATE YOUR ASTHMA CONTROL: COMPLETELY CONTROLLED

## 2025-04-11 ENCOUNTER — OFFICE VISIT (OUTPATIENT)
Dept: FAMILY MEDICINE | Facility: CLINIC | Age: 40
End: 2025-04-11
Payer: COMMERCIAL

## 2025-04-11 VITALS
DIASTOLIC BLOOD PRESSURE: 81 MMHG | BODY MASS INDEX: 27.2 KG/M2 | WEIGHT: 190 LBS | SYSTOLIC BLOOD PRESSURE: 126 MMHG | TEMPERATURE: 97.4 F | OXYGEN SATURATION: 98 % | HEIGHT: 70 IN | RESPIRATION RATE: 18 BRPM | HEART RATE: 98 BPM

## 2025-04-11 DIAGNOSIS — E11.9 TYPE 2 DIABETES, HBA1C GOAL < 8% (H): Primary | ICD-10-CM

## 2025-04-11 DIAGNOSIS — N18.31 STAGE 3A CHRONIC KIDNEY DISEASE (H): ICD-10-CM

## 2025-04-11 LAB
ALBUMIN SERPL BCG-MCNC: 4.5 G/DL (ref 3.5–5.2)
ALP SERPL-CCNC: 96 U/L (ref 40–150)
ALT SERPL W P-5'-P-CCNC: 58 U/L (ref 0–70)
ANION GAP SERPL CALCULATED.3IONS-SCNC: 14 MMOL/L (ref 7–15)
AST SERPL W P-5'-P-CCNC: 34 U/L (ref 0–45)
BILIRUB SERPL-MCNC: 0.3 MG/DL
BUN SERPL-MCNC: 39.4 MG/DL (ref 6–20)
CALCIUM SERPL-MCNC: 9.9 MG/DL (ref 8.8–10.4)
CHLORIDE SERPL-SCNC: 103 MMOL/L (ref 98–107)
CREAT SERPL-MCNC: 2.06 MG/DL (ref 0.67–1.17)
CREAT UR-MCNC: 82.3 MG/DL
EGFRCR SERPLBLD CKD-EPI 2021: 40 ML/MIN/1.73M2
EST. AVERAGE GLUCOSE BLD GHB EST-MCNC: 114 MG/DL
GLUCOSE SERPL-MCNC: 121 MG/DL (ref 70–99)
HBA1C MFR BLD: 5.6 % (ref 0–5.6)
HCO3 SERPL-SCNC: 24 MMOL/L (ref 22–29)
MICROALBUMIN UR-MCNC: 19.7 MG/L
MICROALBUMIN/CREAT UR: 23.94 MG/G CR (ref 0–17)
PHOSPHATE SERPL-MCNC: 3.7 MG/DL (ref 2.5–4.5)
POTASSIUM SERPL-SCNC: 3.9 MMOL/L (ref 3.4–5.3)
PROT SERPL-MCNC: 7.4 G/DL (ref 6.4–8.3)
SODIUM SERPL-SCNC: 141 MMOL/L (ref 135–145)

## 2025-04-11 PROCEDURE — 3079F DIAST BP 80-89 MM HG: CPT | Performed by: INTERNAL MEDICINE

## 2025-04-11 PROCEDURE — 82043 UR ALBUMIN QUANTITATIVE: CPT | Performed by: INTERNAL MEDICINE

## 2025-04-11 PROCEDURE — 36415 COLL VENOUS BLD VENIPUNCTURE: CPT | Performed by: INTERNAL MEDICINE

## 2025-04-11 PROCEDURE — 82570 ASSAY OF URINE CREATININE: CPT | Performed by: INTERNAL MEDICINE

## 2025-04-11 PROCEDURE — 3074F SYST BP LT 130 MM HG: CPT | Performed by: INTERNAL MEDICINE

## 2025-04-11 PROCEDURE — 99214 OFFICE O/P EST MOD 30 MIN: CPT | Performed by: INTERNAL MEDICINE

## 2025-04-11 PROCEDURE — 84100 ASSAY OF PHOSPHORUS: CPT | Performed by: INTERNAL MEDICINE

## 2025-04-11 PROCEDURE — 80053 COMPREHEN METABOLIC PANEL: CPT | Performed by: INTERNAL MEDICINE

## 2025-04-11 PROCEDURE — 83036 HEMOGLOBIN GLYCOSYLATED A1C: CPT | Performed by: INTERNAL MEDICINE

## 2025-04-11 PROCEDURE — 1126F AMNT PAIN NOTED NONE PRSNT: CPT | Performed by: INTERNAL MEDICINE

## 2025-04-11 ASSESSMENT — PAIN SCALES - GENERAL: PAINLEVEL_OUTOF10: NO PAIN (0)

## 2025-04-11 ASSESSMENT — PATIENT HEALTH QUESTIONNAIRE - PHQ9
10. IF YOU CHECKED OFF ANY PROBLEMS, HOW DIFFICULT HAVE THESE PROBLEMS MADE IT FOR YOU TO DO YOUR WORK, TAKE CARE OF THINGS AT HOME, OR GET ALONG WITH OTHER PEOPLE: SOMEWHAT DIFFICULT
SUM OF ALL RESPONSES TO PHQ QUESTIONS 1-9: 3
SUM OF ALL RESPONSES TO PHQ QUESTIONS 1-9: 3

## 2025-04-11 NOTE — PROGRESS NOTES
"  Assessment & Plan   Problem List Items Addressed This Visit       CKD (chronic kidney disease) stage 3, GFR 30-59 ml/min (H) (Chronic)    Relevant Orders    REVIEW OF HEALTH MAINTENANCE PROTOCOL ORDERS (Completed)    Comprehensive metabolic panel (BMP + Alb, Alk Phos, ALT, AST, Total. Bili, TP) (Completed)    Phosphorus (Completed)    Albumin Random Urine Quantitative with Creat Ratio (Completed)    Type 2 diabetes, HbA1C goal < 8% (H) - Primary    Relevant Orders    HEMOGLOBIN A1C (Completed)    BP     126/81  4/14/2025    Lab Results   Component Value Date     04/11/2025     04/14/2023    GLC 98 03/04/2021     Lab Results   Component Value Date    A1C 5.6 04/11/2025    A1C 6.0 03/04/2021     Lab Results   Component Value Date    LDL 89 10/11/2024    LDL 96 08/28/2020     Lab Results   Component Value Date    MICROL 19.7 04/11/2025    MICROL 12 02/25/2019     No results found for: \"MICROALBUMIN\"  Patient undergoing evaluation and genetic evaluations at [place] that may help explain the chronic kidney disease              BMI  Estimated body mass index is 27.26 kg/m  as calculated from the following:    Height as of this encounter: 1.778 m (5' 10\").    Weight as of this encounter: 86.2 kg (190 lb).   Weight management plan: Discussed healthy diet and exercise guidelines    Work on weight loss  Regular exercise      Jai Rascon is a 40 year old, presenting for the following health issues:  Diabetes        4/11/2025     1:55 PM   Additional Questions   Roomed by Katalina     History of Present Illness       Diabetes:   He presents for follow up of diabetes.  He is checking home blood glucose one time daily.   He checks blood glucose before meals.  Blood glucose is never over 200 and never under 70.  When his blood glucose is low, the patient is asymptomatic for confusion, blurred vision, lethargy and reports not feeling dizzy, shaky, or weak.   He has no concerns regarding his diabetes at this time.  He " "is having excessive thirst.  The patient has not had a diabetic eye exam in the last 12 months.          He eats 2-3 servings of fruits and vegetables daily.He consumes 0 sweetened beverage(s) daily.He exercises with enough effort to increase his heart rate 20 to 29 minutes per day.  He exercises with enough effort to increase his heart rate 4 days per week.   He is taking medications regularly.      Autosomal dominant [id number]  Disorders  Longer blood pressure barely elevated   Losartan                     Review of Systems  Constitutional, HEENT, cardiovascular, pulmonary, gi and gu systems are negative, except as otherwise noted.      Objective    /81 (BP Location: Left arm, Patient Position: Sitting, Cuff Size: Adult Regular)   Pulse 98   Temp 97.4  F (36.3  C) (Temporal)   Resp 18   Ht 1.778 m (5' 10\")   Wt 86.2 kg (190 lb)   SpO2 98%   BMI 27.26 kg/m    Body mass index is 27.26 kg/m .  Physical Exam   GENERAL: alert and no distress  EYES: Eyes grossly normal to inspection, PERRL and conjunctivae and sclerae normal  HENT: ear canals and TM's normal, nose and mouth without ulcers or lesions  NECK: no adenopathy, no asymmetry, masses, or scars  RESP: lungs clear to auscultation - no rales, rhonchi or wheezes  CV: regular rate and rhythm, normal S1 S2, no S3 or S4, no murmur, click or rub, no peripheral edema  ABDOMEN: soft, nontender, no hepatosplenomegaly, no masses and bowel sounds normal  MS: no gross musculoskeletal defects noted, no edema  SKIN: no suspicious lesions or rashes  NEURO: Normal strength and tone, mentation intact and speech normal  BACK: no CVA tenderness, no paralumbar tenderness  PSYCH: mentation appears normal, affect normal/bright  LYMPH: no cervical, supraclavicular, axillary, or inguinal adenopathy    Results for orders placed or performed in visit on 04/11/25   HEMOGLOBIN A1C     Status: Normal   Result Value Ref Range    Estimated Average Glucose 114 <117 mg/dL    " Hemoglobin A1C 5.6 0.0 - 5.6 %   Comprehensive metabolic panel (BMP + Alb, Alk Phos, ALT, AST, Total. Bili, TP)     Status: Abnormal   Result Value Ref Range    Sodium 141 135 - 145 mmol/L    Potassium 3.9 3.4 - 5.3 mmol/L    Carbon Dioxide (CO2) 24 22 - 29 mmol/L    Anion Gap 14 7 - 15 mmol/L    Urea Nitrogen 39.4 (H) 6.0 - 20.0 mg/dL    Creatinine 2.06 (H) 0.67 - 1.17 mg/dL    GFR Estimate 40 (L) >60 mL/min/1.73m2    Calcium 9.9 8.8 - 10.4 mg/dL    Chloride 103 98 - 107 mmol/L    Glucose 121 (H) 70 - 99 mg/dL    Alkaline Phosphatase 96 40 - 150 U/L    AST 34 0 - 45 U/L    ALT 58 0 - 70 U/L    Protein Total 7.4 6.4 - 8.3 g/dL    Albumin 4.5 3.5 - 5.2 g/dL    Bilirubin Total 0.3 <=1.2 mg/dL   Phosphorus     Status: Normal   Result Value Ref Range    Phosphorus 3.7 2.5 - 4.5 mg/dL   Albumin Random Urine Quantitative with Creat Ratio     Status: Abnormal   Result Value Ref Range    Creatinine Urine mg/dL 82.3 mg/dL    Albumin Urine mg/L 19.7 mg/L    Albumin Urine mg/g Cr 23.94 (H) 0.00 - 17.00 mg/g Cr           Signed Electronically by: Roberth Tavares MD

## 2025-06-09 ENCOUNTER — MYC REFILL (OUTPATIENT)
Dept: FAMILY MEDICINE | Facility: CLINIC | Age: 40
End: 2025-06-09
Payer: COMMERCIAL

## 2025-06-09 DIAGNOSIS — E66.01 MORBID OBESITY (H): ICD-10-CM

## 2025-06-09 DIAGNOSIS — N18.32 STAGE 3B CHRONIC KIDNEY DISEASE (H): ICD-10-CM

## 2025-06-09 DIAGNOSIS — E11.9 TYPE 2 DIABETES, HBA1C GOAL < 8% (H): ICD-10-CM

## 2025-06-09 RX ORDER — SEMAGLUTIDE 1.34 MG/ML
1 INJECTION, SOLUTION SUBCUTANEOUS
Qty: 9 ML | Refills: 0 | Status: SHIPPED | OUTPATIENT
Start: 2025-06-09

## 2025-06-17 ENCOUNTER — DOCUMENTATION ONLY (OUTPATIENT)
Dept: LAB | Facility: CLINIC | Age: 40
End: 2025-06-17
Payer: COMMERCIAL

## 2025-06-17 DIAGNOSIS — N18.30 CKD (CHRONIC KIDNEY DISEASE) STAGE 3, GFR 30-59 ML/MIN (H): Primary | Chronic | ICD-10-CM

## 2025-06-17 NOTE — PROGRESS NOTES
Abelino Gracia has an upcoming lab appointment:    Future Appointments   Date Time Provider Department Center   2025  9:00 AM LAB FIRST FLOOR [facility]   2025 10:00 AM Amina Mena,  [facility]     Patient is scheduled for the following lab(s): [facility]     The current orders in the chart for Dr. Mena are . Please place new orders if labs are needed.    Thank you,  Araceli Salgado

## 2025-06-23 ENCOUNTER — OFFICE VISIT (OUTPATIENT)
Dept: NEPHROLOGY | Facility: CLINIC | Age: 40
End: 2025-06-23
Attending: INTERNAL MEDICINE
Payer: COMMERCIAL

## 2025-06-23 ENCOUNTER — LAB (OUTPATIENT)
Dept: LAB | Facility: CLINIC | Age: 40
End: 2025-06-23
Attending: INTERNAL MEDICINE
Payer: COMMERCIAL

## 2025-06-23 VITALS
BODY MASS INDEX: 26.66 KG/M2 | WEIGHT: 185.8 LBS | SYSTOLIC BLOOD PRESSURE: 111 MMHG | OXYGEN SATURATION: 98 % | HEART RATE: 86 BPM | DIASTOLIC BLOOD PRESSURE: 76 MMHG

## 2025-06-23 DIAGNOSIS — K75.81 NASH (NONALCOHOLIC STEATOHEPATITIS): Primary | Chronic | ICD-10-CM

## 2025-06-23 DIAGNOSIS — N18.32 CKD STAGE 3B, GFR 30-44 ML/MIN (H): ICD-10-CM

## 2025-06-23 DIAGNOSIS — K75.81 NASH (NONALCOHOLIC STEATOHEPATITIS): Chronic | ICD-10-CM

## 2025-06-23 DIAGNOSIS — E83.39 HYPOPHOSPHATEMIA: ICD-10-CM

## 2025-06-23 DIAGNOSIS — I10 BENIGN ESSENTIAL HYPERTENSION: ICD-10-CM

## 2025-06-23 DIAGNOSIS — N18.32 STAGE 3B CHRONIC KIDNEY DISEASE (H): ICD-10-CM

## 2025-06-23 DIAGNOSIS — N18.30 CKD (CHRONIC KIDNEY DISEASE) STAGE 3, GFR 30-59 ML/MIN (H): ICD-10-CM

## 2025-06-23 DIAGNOSIS — N25.81 SECONDARY RENAL HYPERPARATHYROIDISM: ICD-10-CM

## 2025-06-23 LAB
ALBUMIN MFR UR ELPH: <6 MG/DL
ALBUMIN SERPL BCG-MCNC: 4.4 G/DL (ref 3.5–5.2)
ALBUMIN UR-MCNC: NEGATIVE MG/DL
ALT SERPL W P-5'-P-CCNC: 55 U/L (ref 0–70)
ANION GAP SERPL CALCULATED.3IONS-SCNC: 12 MMOL/L (ref 7–15)
APPEARANCE UR: CLEAR
AST SERPL W P-5'-P-CCNC: 38 U/L (ref 0–45)
BACTERIA #/AREA URNS HPF: ABNORMAL /HPF
BILIRUB UR QL STRIP: NEGATIVE
BUN SERPL-MCNC: 26.7 MG/DL (ref 6–20)
CALCIUM SERPL-MCNC: 9.6 MG/DL (ref 8.8–10.4)
CHLORIDE SERPL-SCNC: 104 MMOL/L (ref 98–107)
COLOR UR AUTO: COLORLESS
CREAT SERPL-MCNC: 2.18 MG/DL (ref 0.67–1.17)
CREAT UR-MCNC: 28.4 MG/DL
EGFRCR SERPLBLD CKD-EPI 2021: 38 ML/MIN/1.73M2
ERYTHROCYTE [DISTWIDTH] IN BLOOD BY AUTOMATED COUNT: 12.6 % (ref 10–15)
GLUCOSE SERPL-MCNC: 116 MG/DL (ref 70–99)
GLUCOSE UR STRIP-MCNC: >1000 MG/DL
HCO3 SERPL-SCNC: 25 MMOL/L (ref 22–29)
HCT VFR BLD AUTO: 46.5 % (ref 40–53)
HGB BLD-MCNC: 16.1 G/DL (ref 13.3–17.7)
HGB UR QL STRIP: NEGATIVE
KETONES UR STRIP-MCNC: NEGATIVE MG/DL
LEUKOCYTE ESTERASE UR QL STRIP: NEGATIVE
MCH RBC QN AUTO: 30.1 PG (ref 26.5–33)
MCHC RBC AUTO-ENTMCNC: 34.6 G/DL (ref 31.5–36.5)
MCV RBC AUTO: 87 FL (ref 78–100)
NITRATE UR QL: NEGATIVE
PH UR STRIP: 5.5 [PH] (ref 5–7)
PHOSPHATE SERPL-MCNC: 3 MG/DL (ref 2.5–4.5)
PLATELET # BLD AUTO: 255 10E3/UL (ref 150–450)
POTASSIUM SERPL-SCNC: 4.2 MMOL/L (ref 3.4–5.3)
PROT/CREAT 24H UR: NORMAL MG/G{CREAT}
PTH-INTACT SERPL-MCNC: 76 PG/ML (ref 15–65)
RBC # BLD AUTO: 5.34 10E6/UL (ref 4.4–5.9)
RBC #/AREA URNS AUTO: ABNORMAL /HPF
SKIP: ABNORMAL
SODIUM SERPL-SCNC: 141 MMOL/L (ref 135–145)
SP GR UR STRIP: 1 (ref 1–1.03)
UROBILINOGEN UR STRIP-MCNC: NORMAL MG/DL
WBC # BLD AUTO: 8.2 10E3/UL (ref 4–11)
WBC #/AREA URNS AUTO: ABNORMAL /HPF

## 2025-06-23 PROCEDURE — 99214 OFFICE O/P EST MOD 30 MIN: CPT | Performed by: INTERNAL MEDICINE

## 2025-06-23 PROCEDURE — 80069 RENAL FUNCTION PANEL: CPT

## 2025-06-23 PROCEDURE — 83970 ASSAY OF PARATHORMONE: CPT

## 2025-06-23 PROCEDURE — 84460 ALANINE AMINO (ALT) (SGPT): CPT

## 2025-06-23 PROCEDURE — 84156 ASSAY OF PROTEIN URINE: CPT

## 2025-06-23 PROCEDURE — 3078F DIAST BP <80 MM HG: CPT | Performed by: INTERNAL MEDICINE

## 2025-06-23 PROCEDURE — 81001 URINALYSIS AUTO W/SCOPE: CPT

## 2025-06-23 PROCEDURE — 3074F SYST BP LT 130 MM HG: CPT | Performed by: INTERNAL MEDICINE

## 2025-06-23 PROCEDURE — 36415 COLL VENOUS BLD VENIPUNCTURE: CPT

## 2025-06-23 PROCEDURE — 85027 COMPLETE CBC AUTOMATED: CPT

## 2025-06-23 PROCEDURE — 84450 TRANSFERASE (AST) (SGOT): CPT

## 2025-06-23 NOTE — NURSING NOTE
Abelino Gracia's goals for this visit include:   Chief Complaint   Patient presents with    RECHECK     Follow up CKD 3       He requests these members of his care team be copied on today's visit information: no     PCP: Roberth Tavares    Referring Provider:  Referred Self, MD  No address on file    /76 (BP Location: Right arm, Patient Position: Chair, Cuff Size: Adult Regular)   Pulse 86   Wt 84.3 kg (185 lb 12.8 oz)   SpO2 98%   BMI 26.66 kg/m      Do you need any medication refills at today's visit? No     PETRA Seay   Neph/Pulm [facility]

## 2025-06-23 NOTE — PROGRESS NOTES
06/23/25     HISTORY OF PRESENT ILLNESS:  Abelino Gracia is a 40 year old male who has had elevated creatinine levels dating back to 2006 according to [facility] records.       History taken from previous notes: His creatinine has ranged anywhere from 1.3-1.7 in his [facility] records.  He was seen by nephrology on one occasion while inpatient at [facility] in 2009 and his kidney injury at that time was felt to be related to long-standing heavy use of NSAIDs.  To briefly summarize risk factors for CKD: heavy NSAID use in the past, previous etoh abuse (sober since 09). Addt hx includes DESHAUN. His serologic workup including C3, C4, ANCA, Anti-GBM, and immunofixations were normal. He is felt to have QURESHI as the cause of his liver abnormalities.               His previous baseline kidney function had been 1.8-2 but has been running slightly higher at 1.8-2.3 in the past year.  His creatinine is 2.08 at this time.  He has been on Topamax for weight loss however without weight loss success.  His phosphorus was noted to be 1.6 at this time and he does report some muscle related weakness.  He is taking 1000 units of vitamin D daily.     03/23/20: no home BP readings at this time (no cuff). Clinic Readings have been less than 130/80. Weight was 252 lbs down to 233 lbs in Dec. Has gained some of that back but still kept most off. No NSAIDs. No swelling concerns. No longer on topamax.      09/22/20: video visit. Since last visit, he underwent kidney biopsy on 7/15/20 which showed:  FINAL DIAGNOSIS:   Native kidney biopsy with glomerulomegaly and nonspecific chronic   interstitial changes   No success with weight loss so far - more stress. We spent time discussing his biopsy findings. Overall doing well - no specific complaints at this time. No edema.     03/22/21: video visit. GFR 36-39 for the past year. Only minimal proteinuria. On losartan 12.5 mg daily. Feeling more cold in his hands and feet. No constipation/diarrhea. No numbness  /tingling. BP less than 130 when he checks it but on/off with checking it at home. He has received first dose of the vaccine. No swelling. Working from home.     3/21/22: in person visit. Creatinine had been 2.1-2.2 but slightly improved most recently at 1.95 now as well as at his Nov lab. UPCR was 0.21 g/g on last check. He had bilateral carpal tunnel repair since last visit (nov and dec).  but was 97 on one occasion in Dec. No edema concerns.     03/20/23: in person visit. GFR was previously in the upper 30s but has been 40-44 since Nov 2021. He has maintained his weight at 221 lbs most recently - had been 258 lbs in Dec 2021. He is not using CPAP mask - never tolerated. He remains away from tobacco as he has for years. He does use some ibuprofen intermittently for migraines - had to come off of propanolol due to lightheadedness on standing.     04/30/24: video visit. He feels pretty good. Not as active as should be. 258 in 2022 - 225 lbs. No lightheadedness. REcently traveled in [place] - trouble with aching muscles when walking. NO PPI therapy. Rarely takes tums. No family hx of dialysis or transplant needs. No swelling. Knee pain - creaking. Hands maybe painful - hx of carpal tunnel surgery. No rash.        Current Outpatient Medications   Medication Sig Dispense Refill    acetaminophen (TYLENOL) 325 MG tablet Take 325 mg by mouth as needed       buPROPion (WELLBUTRIN XL) 300 MG 24 hr tablet TAKE 1 TABLET(300 MG) BY MOUTH DAILY 90 tablet 1    busPIRone (BUSPAR) 10 MG tablet Take 10 mg by mouth 2 times daily. Patient's comments:   30mg am, 20mg pm      empagliflozin (JARDIANCE) 10 MG TABS tablet Take 10 mg by mouth.      fluticasone (FLONASE) 50 MCG/ACT nasal spray Spray 1-2 sprays into both nostrils daily as needed  1 Package 11    hydrOXYzine (ATARAX) 25 MG tablet every 6 hours as needed.      K Phos Sutter-Sod Phos Di & Mono (PHOSPHOROUS) 155-852-130 MG TABS Take 1 tablet by mouth 2 times daily. 180  tablet 3    Semaglutide, 1 MG/DOSE, (OZEMPIC, 1 MG/DOSE,) 4 MG/3ML pen Inject 1 mg subcutaneously every 7 days. 9 mL 0    traZODone (DESYREL) 50 MG tablet Take 1-2 tablets ( mg) by mouth At Bedtime 180 tablet 4    Vitamin D, Cholecalciferol, 25 MCG (1000 UT) CAPS daily.      albuterol (PROAIR HFA) 108 (90 Base) MCG/ACT inhaler Inhale 2 puffs into the lungs every 4 hours as needed (Patient not taking: Reported on 6/23/2025) 2 Inhaler 3     No current facility-administered medications for this visit.       Exam:  /76 (BP Location: Right arm, Patient Position: Chair, Cuff Size: Adult Regular)   Pulse 86   Wt 84.3 kg (185 lb 12.8 oz)   SpO2 98%   BMI 26.66 kg/m    GENERAL APPEARANCE: alert and no distress  CV - RRR  Ext - no edema    Results:   No visits with results within 1 Day(s) from this visit.   Latest known visit with results is:   Lab on 03/19/2024   Component Date Value Ref Range Status    WBC Count 03/19/2024 7.4  4.0 - 11.0 10e3/uL Final    RBC Count 03/19/2024 5.38  4.40 - 5.90 10e6/uL Final    Hemoglobin 03/19/2024 16.0  13.3 - 17.7 g/dL Final    Hematocrit 03/19/2024 47.2  40.0 - 53.0 % Final    MCV 03/19/2024 88  78 - 100 fL Final    MCH 03/19/2024 29.7  26.5 - 33.0 pg Final    MCHC 03/19/2024 33.9  31.5 - 36.5 g/dL Final    RDW 03/19/2024 12.5  10.0 - 15.0 % Final    Platelet Count 03/19/2024 289  150 - 450 10e3/uL Final    Parathyroid Hormone Intact 03/19/2024 66 (H)  15 - 65 pg/mL Final    Total Protein Urine mg/dL 03/19/2024 9.9    mg/dL Final    The reference ranges have not been established in urine protein. The results should be integrated into the clinical context for interpretation.    Total Protein Urine mg/mg Creat 03/19/2024 0.16  0.00 - 0.20 mg/mg Cr Final    Creatinine Urine mg/dL 03/19/2024 61.4  mg/dL Final    The reference ranges have not been established in urine creatinine. The results should be integrated into the clinical context for interpretation.    Sodium  03/19/2024 139  135 - 145 mmol/L Final    Reference intervals for this test were updated on 09/26/2023 to more accurately reflect our healthy population. There may be differences in the flagging of prior results with similar values performed with this method. Interpretation of those prior results can be made in the context of the updated reference intervals.     Potassium 03/19/2024 4.4  3.4 - 5.3 mmol/L Final    Chloride 03/19/2024 104  98 - 107 mmol/L Final    Carbon Dioxide (CO2) 03/19/2024 23  22 - 29 mmol/L Final    Anion Gap 03/19/2024 12  7 - 15 mmol/L Final    Glucose 03/19/2024 160 (H)  70 - 99 mg/dL Final    Urea Nitrogen 03/19/2024 19.3  6.0 - 20.0 mg/dL Final    Creatinine 03/19/2024 2.18 (H)  0.67 - 1.17 mg/dL Final    GFR Estimate 03/19/2024 38 (L)  >60 mL/min/1.73m2 Final    Calcium 03/19/2024 9.5  8.6 - 10.0 mg/dL Final    Albumin 03/19/2024 4.3  3.5 - 5.2 g/dL Final    Phosphorus 03/19/2024 1.8 (L)  2.5 - 4.5 mg/dL Final    Color Urine 03/19/2024 Yellow  Colorless, Straw, Light Yellow, Yellow Final    Appearance Urine 03/19/2024 Clear  Clear Final    Glucose Urine 03/19/2024 Negative  Negative mg/dL Final    Bilirubin Urine 03/19/2024 Negative  Negative Final    Ketones Urine 03/19/2024 Negative  Negative mg/dL Final    Specific Gravity Urine 03/19/2024 1.010  1.003 - 1.035 Final    Blood Urine 03/19/2024 Negative  Negative Final    pH Urine 03/19/2024 5.5  5.0 - 7.0 Final    Protein Albumin Urine 03/19/2024 Negative  Negative mg/dL Final    Urobilinogen Urine 03/19/2024 0.2  0.2, 1.0 E.U./dL Final    Nitrite Urine 03/19/2024 Negative  Negative Final    Leukocyte Esterase Urine 03/19/2024 Negative  Negative Final    Creatinine Urine mg/dL 03/19/2024 61.4  mg/dL Final    The reference ranges have not been established in urine creatinine. The results should be integrated into the clinical context for interpretation.  The reference ranges have not been established in urine creatinine. The results  should be integrated into the clinical context for interpretation.    Albumin Urine mg/L 03/19/2024 16.0  mg/L Final    The reference ranges have not been established in urine albumin. The results should be integrated into the clinical context for interpretation.    Albumin Urine mg/g Cr 03/19/2024 26.06 (H)  0.00 - 17.00 mg/g Cr Final    Microalbuminuria is defined as an albumin:creatinine ratio of 17 to 299 for males and 25 to 299 for females. A ratio of albumin:creatinine of 300 or higher is indicative of overt proteinuria.  Due to biologic variability, positive results should be confirmed by a second, first-morning random or 24-hour timed urine specimen. If there is discrepancy, a third specimen is recommended. When 2 out of 3 results are in the microalbuminuria range, this is evidence for incipient nephropathy and warrants increased efforts at glucose control, blood pressure control, and institution of therapy with an angiotensin-converting-enzyme (ACE) inhibitor (if the patient can tolerate it).      Hemoglobin A1C 03/19/2024 6.0 (H)  0.0 - 5.6 % Final    Normal <5.7%   Prediabetes 5.7-6.4%    Diabetes 6.5% or higher     Note: Adopted from ADA consensus guidelines.    Cholesterol 03/19/2024 154  <200 mg/dL Final    Triglycerides 03/19/2024 99  <150 mg/dL Final    Direct Measure HDL 03/19/2024 41  >=40 mg/dL Final    LDL Cholesterol Calculated 03/19/2024 93  <=100 mg/dL Final    Non HDL Cholesterol 03/19/2024 113  <130 mg/dL Final    Patient Fasting > 8hrs? 03/19/2024 No   Final    RBC Urine 03/19/2024 0-2  0-2 /HPF /HPF Final    WBC Urine 03/19/2024 0-5  0-5 /HPF /HPF Final         Lab results were reviewed and interpreted.       Assessment/Plan:   1.  CKD Stage 3:  Biopsy from July 2020 is consistent with nonspecific interstitial changes and glomerulomegaly. His biggest risk factor for kidney disease is related to long-standing NSAID use. He is now away from NSAIDs. Educated on the benefits of weight loss and  blood pressure control. Will monitor routinely for now. On losartan 12.5 for protein lowering effect - pleased to see that his last UPCR was normal in Sept 2022.     I am going to repeat serologic workup in the near future to assure no addt risks for interstitial disease - he is agreeable to this addt lab testing.      2.  QURESHI: Encouraged ongoing weight loss not only to help his kidney function/proteinuria but also avoid addt liver damage related to fatty liver disease.  Also encouraged avoidance of etoh. He has been seen by hepatology.      3. Prediabetes - followed by PCP - last A1C 6%     4. Obesity: has had success with weight loss - weight 221 lbs down from 258 lbs in Dec 2021.     5. 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      There are no Patient Instructions on file for this visit.     Amina Mena, DO

## 2025-06-28 LAB
DEPRECATED CALCIDIOL+CALCIFEROL SERPL-MC: <55 UG/L (ref 20–75)
VITAMIN D2 SERPL-MCNC: <5 UG/L
VITAMIN D3 SERPL-MCNC: 50 UG/L

## 2025-07-13 ENCOUNTER — HEALTH MAINTENANCE LETTER (OUTPATIENT)
Age: 40
End: 2025-07-13

## 2025-08-25 ENCOUNTER — MYC REFILL (OUTPATIENT)
Dept: FAMILY MEDICINE | Facility: CLINIC | Age: 40
End: 2025-08-25
Payer: COMMERCIAL

## 2025-08-25 DIAGNOSIS — N18.32 STAGE 3B CHRONIC KIDNEY DISEASE (H): ICD-10-CM

## 2025-08-25 DIAGNOSIS — E11.9 TYPE 2 DIABETES, HBA1C GOAL < 8% (H): ICD-10-CM

## 2025-08-25 DIAGNOSIS — E66.01 MORBID OBESITY (H): ICD-10-CM

## 2025-08-25 RX ORDER — SEMAGLUTIDE 1.34 MG/ML
1 INJECTION, SOLUTION SUBCUTANEOUS
Qty: 9 ML | Refills: 0 | Status: SHIPPED | OUTPATIENT
Start: 2025-08-25

## (undated) DEVICE — ESU PENCIL SMOKE EVAC W/ROCKER SWITCH 0703-047-000

## (undated) DEVICE — PACK HAND WRIST SOP15HWFSP

## (undated) DEVICE — GLOVE PROTEXIS BLUE W/NEU-THERA 7.5  2D73EB75

## (undated) DEVICE — ESU GROUND PAD ADULT W/CORD E7507

## (undated) DEVICE — GLOVE PROTEXIS POWDER FREE 7.5 ORTHOPEDIC 2D73ET75

## (undated) DEVICE — PREP CHLORAPREP 26ML TINTED ORANGE  260815

## (undated) DEVICE — SU ETHILON 5-0 PS-2 18" 1666H

## (undated) DEVICE — SOL WATER IRRIG 1000ML BOTTLE 07139-09

## (undated) DEVICE — NDL 19GA 1.5"

## (undated) DEVICE — BNDG ELASTIC 4"X5YDS UNSTERILE 6611-40

## (undated) DEVICE — CAST PLASTER SPLINT 4X15" 7394

## (undated) DEVICE — CAST PADDING 4" UNSTERILE 9044

## (undated) DEVICE — DRSG GAUZE 4X4" TOPPER

## (undated) DEVICE — DRSG STERI STRIP 1/2X4" R1547

## (undated) RX ORDER — CLINDAMYCIN PHOSPHATE 150 MG/ML
INJECTION, SOLUTION INTRAVENOUS
Status: DISPENSED
Start: 2021-12-03

## (undated) RX ORDER — PROPOFOL 10 MG/ML
INJECTION, EMULSION INTRAVENOUS
Status: DISPENSED
Start: 2021-12-03

## (undated) RX ORDER — ACETAMINOPHEN 325 MG/1
TABLET ORAL
Status: DISPENSED
Start: 2021-12-03

## (undated) RX ORDER — FENTANYL CITRATE 50 UG/ML
INJECTION, SOLUTION INTRAMUSCULAR; INTRAVENOUS
Status: DISPENSED
Start: 2020-07-15

## (undated) RX ORDER — OXYCODONE HYDROCHLORIDE 5 MG/1
TABLET ORAL
Status: DISPENSED
Start: 2021-12-03

## (undated) RX ORDER — OXYCODONE HYDROCHLORIDE 5 MG/1
TABLET ORAL
Status: DISPENSED
Start: 2021-12-17

## (undated) RX ORDER — ONDANSETRON 2 MG/ML
INJECTION INTRAMUSCULAR; INTRAVENOUS
Status: DISPENSED
Start: 2021-12-03

## (undated) RX ORDER — PROPOFOL 10 MG/ML
INJECTION, EMULSION INTRAVENOUS
Status: DISPENSED
Start: 2021-12-17

## (undated) RX ORDER — FENTANYL CITRATE-0.9 % NACL/PF 10 MCG/ML
PLASTIC BAG, INJECTION (ML) INTRAVENOUS
Status: DISPENSED
Start: 2021-12-03

## (undated) RX ORDER — FENTANYL CITRATE 50 UG/ML
INJECTION, SOLUTION INTRAMUSCULAR; INTRAVENOUS
Status: DISPENSED
Start: 2021-12-03

## (undated) RX ORDER — SODIUM CHLORIDE 9 MG/ML
INJECTION, SOLUTION INTRAVENOUS
Status: DISPENSED
Start: 2020-07-15

## (undated) RX ORDER — LIDOCAINE HYDROCHLORIDE 10 MG/ML
INJECTION, SOLUTION EPIDURAL; INFILTRATION; INTRACAUDAL; PERINEURAL
Status: DISPENSED
Start: 2020-07-15

## (undated) RX ORDER — KETOROLAC TROMETHAMINE 30 MG/ML
INJECTION, SOLUTION INTRAMUSCULAR; INTRAVENOUS
Status: DISPENSED
Start: 2021-12-03

## (undated) RX ORDER — ONDANSETRON 2 MG/ML
INJECTION INTRAMUSCULAR; INTRAVENOUS
Status: DISPENSED
Start: 2021-12-17